# Patient Record
Sex: MALE | Race: WHITE | Employment: FULL TIME | URBAN - METROPOLITAN AREA
[De-identification: names, ages, dates, MRNs, and addresses within clinical notes are randomized per-mention and may not be internally consistent; named-entity substitution may affect disease eponyms.]

---

## 2021-03-31 ENCOUNTER — APPOINTMENT (OUTPATIENT)
Dept: GENERAL RADIOLOGY | Age: 36
End: 2021-03-31
Payer: COMMERCIAL

## 2021-03-31 ENCOUNTER — HOSPITAL ENCOUNTER (EMERGENCY)
Age: 36
Discharge: HOME | End: 2021-03-31
Payer: COMMERCIAL

## 2021-03-31 VITALS
RESPIRATION RATE: 18 BRPM | DIASTOLIC BLOOD PRESSURE: 88 MMHG | HEIGHT: 73 IN | OXYGEN SATURATION: 99 % | SYSTOLIC BLOOD PRESSURE: 169 MMHG | HEART RATE: 74 BPM | WEIGHT: 310 LBS | BODY MASS INDEX: 41.08 KG/M2 | TEMPERATURE: 97.9 F

## 2021-03-31 DIAGNOSIS — S61.412A LACERATION OF LEFT HAND WITHOUT FOREIGN BODY, INITIAL ENCOUNTER: Primary | ICD-10-CM

## 2021-03-31 PROCEDURE — 73130 X-RAY EXAM OF HAND: CPT

## 2021-03-31 PROCEDURE — 12002 RPR S/N/AX/GEN/TRNK2.6-7.5CM: CPT

## 2021-03-31 PROCEDURE — 99283 EMERGENCY DEPT VISIT LOW MDM: CPT

## 2021-04-01 NOTE — ED NOTES
Bed: TR  Expected date:   Expected time:   Means of arrival:   Comments:     Caitlyn Torres RN  03/31/21 2105

## 2021-04-01 NOTE — ED PROVIDER NOTES
[address] ED  EMERGENCY DEPARTMENT ENCOUNTER        Pt Name: Kate Tracy  MRN: [id number]  [place] 1985  Date of evaluation: 3/31/2021  Provider: Harry Braden PA-C  PCP: Breanne Dinh DO    PATRIA. I have evaluated this patient. My supervising physician was available for consultation. CHIEF COMPLAINT       Chief Complaint   Patient presents with    Laceration     pt stabbed self with knife in palm of lf hand. Pt went to urgent care, they saw tendon/ligament and sent to the ER. tetanus up to date. laceration was already cleaned and irrigated prior to arrival.       HISTORY OF PRESENT ILLNESS   (Location, Timing/Onset, Context/Setting, Quality, Duration, Modifying Factors, Severity, Associated Signs and Symptoms)  Note limiting factors. Kate Tracy is a 39 y.o. male brought in today for evaluation of a laceration to his left palm. States that he accidentally cut himself with a  knife while trying to get brownies out of a pan. Onset occurred just prior to arrival to the ED. Duration of symptoms have been persistent since onset. Context includes left hand laceration. He states he is already up-to-date on his tetanus shot. He is right-hand dominant. States he was seen at urgent care and they saw a tendon and so they advised him to come into the ED for further evaluation. Patient denies any loss of range of motion of his left hand. Denies any decreased sensation. He has not tried anything at home for symptomatic relief. No aggravating complaints. No alleviating complaints. He otherwise denies any other concerns at this time. Nursing Notes were all reviewed and agreed with or any disagreements were addressed in the HPI. REVIEW OF SYSTEMS    (2-9 systems for level 4, 10 or more for level 5)     Review of Systems   Constitutional: Negative. Musculoskeletal: Negative. Skin: Positive for wound. Neurological: Negative.     Hematological: Negative. Positives and Pertinent negatives as per HPI. Except as noted above in the ROS, all other systems were reviewed and negative. PAST MEDICAL HISTORY   History reviewed. No pertinent past medical history. SURGICAL HISTORY     Past Surgical History:   Procedure Laterality Date    CHOLECYSTECTOMY           CURRENTMEDICATIONS       Previous Medications    IBUPROFEN (ADVIL;MOTRIN) 800 MG TABLET    Take 1 tablet by mouth every 8 hours as needed for Pain or Fever         ALLERGIES     Patient has no known allergies. FAMILYHISTORY     History reviewed. No pertinent family history. SOCIAL HISTORY       Social History     Tobacco Use    Smoking status: Never Smoker    Smokeless tobacco: Current User     Types: Chew   Substance Use Topics    Alcohol use: No    Drug use: No       SCREENINGS             PHYSICAL EXAM    (up to 7 for level 4, 8 or more for level 5)     ED Triage Vitals   BP Temp Temp Source Pulse Resp SpO2 Height Weight   03/31/21 2103 03/31/21 2103 03/31/21 2100 03/31/21 2103 03/31/21 2103 03/31/21 2103 03/31/21 2100 03/31/21 2100   (!) 169/89 97.9 °F (36.6 °C) Temporal 76 16 98 % 6' 1\" (1.854 m) (!) 310 lb (140.6 kg)       Physical Exam  Vitals signs and nursing note reviewed. Constitutional:       Appearance: He is well-developed. He is not diaphoretic. HENT:      Head: Normocephalic and atraumatic. Nose: Nose normal.   Eyes:      General:         Right eye: No discharge. Left eye: No discharge. Neck:      Musculoskeletal: Normal range of motion and neck supple. Pulmonary:      Effort: Pulmonary effort is normal. No respiratory distress. Musculoskeletal: Normal range of motion. General: No deformity. Skin:     General: Skin is warm and dry. Findings: Laceration present. Comments: 4 cm laceration to the left palm. Neurovascularly intact. Tendon is visualized however no injury to the tendon.   Patient has full range of motion of all 5 digits including full flexion and extension as well as AB duction and adduction. Bleeding controlled. No joint involvement. Neurological:      Mental Status: He is alert and oriented to person, place, and time. Psychiatric:         Behavior: Behavior normal.         DIAGNOSTIC RESULTS   LABS:    Labs Reviewed - No data to display    All other labs were within normal range or not returned as of this dictation. EKG: All EKG's are interpreted by the Emergency Department Physician in the absence of a cardiologist.  Please see their note for interpretation of EKG. RADIOLOGY:   Non-plain film images such as CT, Ultrasound and MRI are read by the radiologist. Plain radiographic images are visualized and preliminarily interpreted by the ED Provider with the below findings:        Interpretation per the Radiologist below, if available at the time of this note:    XR HAND RIGHT (MIN 3 VIEWS)   Final Result   Negative for radiopaque foreign body or bony injury           Xr Hand Right (min 3 Views)    Result Date: 3/31/2021  EXAMINATION: THREE XRAY VIEWS OF THE RIGHT HAND 3/31/2021 9:42 pm COMPARISON: None. HISTORY: ORDERING SYSTEM PROVIDED HISTORY: puncture wound TECHNOLOGIST PROVIDED HISTORY: Reason for exam:->puncture wound Reason for Exam: laceration palm of hand FINDINGS: Finger ring obscures the midshaft of the 4th proximal phalanx. No radiopaque foreign body no bony injury. No bony or joint abnormality     Negative for radiopaque foreign body or bony injury           PROCEDURES   Unless otherwise noted below, none     Lac Repair    Date/Time: 3/31/2021 10:56 PM  Performed by: Ian Zavaleta PA-C  Authorized by: Ian Zavaleta PA-C     Consent:     Consent obtained:  Verbal    Alternatives discussed:  No treatment  Anesthesia (see MAR for exact dosages):      Anesthesia method:  Local infiltration    Local anesthetic:  Lidocaine 1% w/o epi  Laceration details:     Location:  Hand    Hand location: L palm    Length (cm):  4  Repair type:     Repair type:  Simple  Exploration:     Hemostasis achieved with:  Direct pressure    Wound exploration: wound explored through full range of motion      Wound extent: no areolar tissue violation noted, no fascia violation noted, no foreign bodies/material noted, no muscle damage noted, no nerve damage noted, no tendon damage noted, no underlying fracture noted and no vascular damage noted      Contaminated: no    Treatment:     Area cleansed with:  Saline    Amount of cleaning:  Standard    Irrigation solution:  Sterile saline    Irrigation volume:  100    Irrigation method:  Syringe    Visualized foreign bodies/material removed: no    Skin repair:     Repair method:  Sutures    Suture size:  4-0    Suture technique:  Simple interrupted    Number of sutures:  4  Approximation:     Approximation:  Close  Post-procedure details:     Dressing:  Bulky dressing    Patient tolerance of procedure: Tolerated well, no immediate complications        CRITICAL CARE TIME   N/A    CONSULTS:  None      EMERGENCY DEPARTMENT COURSE and DIFFERENTIAL DIAGNOSIS/MDM:   Vitals:    Vitals:    03/31/21 2100 03/31/21 2103   BP:  (!) 169/89   Pulse:  76   Resp:  16   Temp:  97.9 °F (36.6 °C)   TempSrc: Temporal Temporal   SpO2:  98%   Weight: (!) 310 lb (140.6 kg)    Height: 6' 1\" (1.854 m)        Patient was given the following medications:  Medications - No data to display        Patient brought in today by private vehicle for complaints of a laceration to his left palm. On exam patient is alert oriented afebrile breathing on room air satting at 98%. Nontoxic. No acute respiratory distress. Old labs and records reviewed at this time. Patient was seen by myself and my attending was available for consultation. X-ray reveals negative for radiopaque foreign body. No bony injury. Patient is up-to-date on his tetanus shot. Patient cleaned extensively here.   Please see procedure note for full details. Patient tolerated procedure well. Patient told to return in 7 to 10 days for suture removal.  Patient told to return sooner if he noticed any new or worsening symptoms including but not limited to increased pain redness swelling drainage or any other signs or symptoms of infection or any new or worsening symptoms. Patient was given follow-up for orthopedic hand and told to follow-up in the next 3 to 5 days. Patient verbalized understanding of this plan was comfortable and stable at time of discharge. I did feel comfortable sending this patient home with close follow-up instructions and strict return precautions. Patient was discharged in stable condition. FINAL IMPRESSION      1.  Laceration of left hand without foreign body, initial encounter          DISPOSITION/PLAN   DISPOSITION Discharge - Pending Orders Complete 03/31/2021 10:40:37 PM      PATIENT REFERREDTO:  Jose Juan Gatica MD  [address] [address]  [phone]    Schedule an appointment as soon as possible for a visit   As needed, If symptoms worsen    [facility] ED  [address] [place] [address]  Schedule an appointment as soon as possible for a visit   As needed, If symptoms worsen, For suture removal 10-12 days      DISCHARGE MEDICATIONS:  New Prescriptions    No medications on file       DISCONTINUED MEDICATIONS:  Discontinued Medications    No medications on file              (Please note that portions of this note were completed with a voice recognition program.  Efforts were made to edit the dictations but occasionally words are mis-transcribed.)    Cahterine Nieto PA-C (electronically signed)            Catherine Nieto PA-C  03/31/21 0157
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
## 2021-04-05 ENCOUNTER — OFFICE VISIT (OUTPATIENT)
Dept: ORTHOPEDIC SURGERY | Age: 36
End: 2021-04-05
Payer: COMMERCIAL

## 2021-04-05 VITALS — HEIGHT: 73 IN | BODY MASS INDEX: 41.08 KG/M2 | WEIGHT: 310 LBS

## 2021-04-05 DIAGNOSIS — S65.312A LACERATION OF DEEP PALMAR ARCH OF LEFT HAND, INITIAL ENCOUNTER: Primary | ICD-10-CM

## 2021-04-05 DIAGNOSIS — S61.402A FLEXOR TENDON LACERATION OF LEFT HAND WITH OPEN WOUND, INITIAL ENCOUNTER: ICD-10-CM

## 2021-04-05 DIAGNOSIS — S66.822A FLEXOR TENDON LACERATION OF LEFT HAND WITH OPEN WOUND, INITIAL ENCOUNTER: ICD-10-CM

## 2021-04-05 PROCEDURE — 99204 OFFICE O/P NEW MOD 45 MIN: CPT | Performed by: PHYSICIAN ASSISTANT

## 2021-04-05 NOTE — LETTER
[address] [place] [address] Dr Gaviota Infante [employer] [address]  Phone: [phone]  Fax: [phone]    Annita De Leon        April 5, 2021     Patient: Rhina Gonzalez   YOB: 1985   Date of Visit: 4/5/2021       To Whom It May Concern: It is my medical opinion that [facility] may return to full duty immediately with no restrictions. If you have any questions or concerns, please don't hesitate to call.     Sincerely,    Ashly Saavedra PA-C

## 2021-04-05 NOTE — PROGRESS NOTES
Range of Motion: Full range of motion of the second digit at the PIP, DIP, and MCP joints    Strength: 5/5 strength with flexion and extension at the PIP, DIP, and MCP joints    Special Tests: Sensation intact but decreased in sensitivity over the radial aspect of the second digit    Skin: There are no rashes, ulcerations or lesions. Gait: Normal    Reflex +2    Additional Comments:       Additional Examinations:         Right Upper Extremity:  Examination of the right upper extremity does not show any tenderness, deformity or injury. Range of motion is unremarkable. There is no gross instability. There are no rashes, ulcerations or lesions. Strength and tone are normal.    Radiology:     X-rays obtained in the emergency room and reviewed in office:  Views AP, lateral, and oblique views  Location left hand  Impression no evidence of fractures, dislocations, or foreign bodies          Assessment : Left hand laceration    Impression:  Encounter Diagnoses   Name Primary?  Laceration of deep palmar arch of left hand, initial encounter Yes    Flexor tendon laceration of left hand with open wound, initial encounter        Office Procedures:  No orders of the defined types were placed in this encounter. Treatment Plan: As stated in HPI patient did have a video taken of his hand. It does appear that there is a tendon that is protruding from the wound. This is an contradiction to the emergency room port which states no tendon damage. He will continue to keep the wound clean and dry. He will continue to ice and elevate for pain and swelling control. I will refer the patient for a hand specialty consultation. This should be performed in the next 2 days. I discussed with Kemi Kate that his history, symptoms, signs and imaging are most consistent with hand laceration.     We reviewed the natural history of these conditions and treatment options ranging from conservative measures (rest, icing, activity

## 2021-04-07 ENCOUNTER — HOSPITAL ENCOUNTER (OUTPATIENT)
Age: 36
Discharge: HOME | End: 2021-04-07
Payer: COMMERCIAL

## 2021-04-07 PROCEDURE — U0005 INFEC AGEN DETEC AMPLI PROBE: HCPCS

## 2021-04-07 PROCEDURE — U0003 INFECTIOUS AGENT DETECTION BY NUCLEIC ACID (DNA OR RNA); SEVERE ACUTE RESPIRATORY SYNDROME CORONAVIRUS 2 (SARS-COV-2) (CORONAVIRUS DISEASE [COVID-19]), AMPLIFIED PROBE TECHNIQUE, MAKING USE OF HIGH THROUGHPUT TECHNOLOGIES AS DESCRIBED BY CMS-2020-01-R: HCPCS

## 2021-04-08 LAB — SARS-COV-2: NOT DETECTED

## 2023-02-02 ENCOUNTER — APPOINTMENT (OUTPATIENT)
Dept: CT IMAGING | Age: 38
End: 2023-02-02
Payer: COMMERCIAL

## 2023-02-02 ENCOUNTER — HOSPITAL ENCOUNTER (EMERGENCY)
Age: 38
Discharge: HOME | End: 2023-02-02
Attending: STUDENT IN AN ORGANIZED HEALTH CARE EDUCATION/TRAINING PROGRAM
Payer: COMMERCIAL

## 2023-02-02 VITALS
BODY MASS INDEX: 41.75 KG/M2 | HEIGHT: 73 IN | TEMPERATURE: 98 F | RESPIRATION RATE: 16 BRPM | HEART RATE: 70 BPM | SYSTOLIC BLOOD PRESSURE: 179 MMHG | DIASTOLIC BLOOD PRESSURE: 113 MMHG | OXYGEN SATURATION: 100 % | WEIGHT: 315 LBS

## 2023-02-02 DIAGNOSIS — S39.012A STRAIN OF LUMBAR REGION, INITIAL ENCOUNTER: Primary | ICD-10-CM

## 2023-02-02 PROCEDURE — 99284 EMERGENCY DEPT VISIT MOD MDM: CPT

## 2023-02-02 PROCEDURE — 72131 CT LUMBAR SPINE W/O DYE: CPT

## 2023-02-02 RX ORDER — METHOCARBAMOL 500 MG/1
500 TABLET, FILM COATED ORAL EVERY 8 HOURS PRN
Qty: 30 TABLET | Refills: 0 | Status: SHIPPED | OUTPATIENT
Start: 2023-02-02 | End: 2023-02-12

## 2023-02-02 RX ORDER — IBUPROFEN 800 MG/1
800 TABLET ORAL 2 TIMES DAILY PRN
Qty: 30 TABLET | Refills: 0 | Status: SHIPPED | OUTPATIENT
Start: 2023-02-02

## 2023-02-02 RX ORDER — KETOROLAC TROMETHAMINE 30 MG/ML
30 INJECTION, SOLUTION INTRAMUSCULAR; INTRAVENOUS ONCE
Status: DISCONTINUED | OUTPATIENT
Start: 2023-02-02 | End: 2023-02-02 | Stop reason: HOSPADM

## 2023-02-02 RX ORDER — METHYLPREDNISOLONE 4 MG/1
TABLET ORAL
Qty: 1 KIT | Refills: 0 | Status: SHIPPED | OUTPATIENT
Start: 2023-02-02

## 2023-02-02 RX ORDER — DEXAMETHASONE SODIUM PHOSPHATE 10 MG/ML
10 INJECTION, SOLUTION INTRAMUSCULAR; INTRAVENOUS ONCE
Status: DISCONTINUED | OUTPATIENT
Start: 2023-02-02 | End: 2023-02-02 | Stop reason: HOSPADM

## 2023-02-02 ASSESSMENT — PAIN DESCRIPTION - FREQUENCY: FREQUENCY: CONTINUOUS

## 2023-02-02 ASSESSMENT — PAIN DESCRIPTION - ORIENTATION: ORIENTATION: LOWER

## 2023-02-02 ASSESSMENT — PAIN SCALES - GENERAL: PAINLEVEL_OUTOF10: 8

## 2023-02-02 ASSESSMENT — PAIN - FUNCTIONAL ASSESSMENT: PAIN_FUNCTIONAL_ASSESSMENT: 0-10

## 2023-02-02 ASSESSMENT — PAIN DESCRIPTION - LOCATION: LOCATION: BACK

## 2023-02-02 NOTE — ED PROVIDER NOTES
Primary Care Physician: Dawood Llamas DO   Attending Physician: No att. providers found     History   Chief Complaint   Patient presents with    Back Pain     Injured back on Tuesday at work. Seen at Urgent care same day and ordered to take OTC pain medication. Denies numbness in legs or incontinence        HPI   Pallavi Vaz  is a 40 y.o. male with no medical history apart from morbid obesity who presents complaining of back pain. Patient stated 2 days ago when he was at work when he had a pop. He stated since then he has been having pain. He denies any bladder or stool incontinence. Stated he is never had sciatic nerve pain in the past.  Now the pain is worse with movements. He stated that the pain does not radiate to his legs no numbness or weakness. History reviewed. No pertinent past medical history. Past Surgical History:   Procedure Laterality Date    CHOLECYSTECTOMY          History reviewed. No pertinent family history. Social History     Socioeconomic History    Marital status:      Spouse name: None    Number of children: None    Years of education: None    Highest education level: None   Tobacco Use    Smoking status: Never    Smokeless tobacco: Current     Types: Chew   Substance and Sexual Activity    Alcohol use: No    Drug use: No    Sexual activity: Yes     Partners: Female        Review of Systems   10 total systems reviewed and found to be negative unless otherwise noted in HPI     Physical Exam   BP (!) 179/113   Pulse 70   Temp 98 °F (36.7 °C)   Resp 16   Ht 6' 1\" (1.854 m)   Wt (!) 375 lb (170.1 kg)   SpO2 100%   BMI 49.48 kg/m²      Physical Exam  Vitals and nursing note reviewed. Constitutional:       General: Pt is in acute distress. Appearance: Pt is well appearing. He is not diaphoretic. HENT:      Head: Normocephalic and atraumatic.       Right Ear: Tympanic membrane normal.      Left Ear: Tympanic membrane normal.      Nose: Nose normal. Mouth/Throat:      Mouth: Mucous membranes are moist.      Pharynx: Oropharynx is clear. Eyes:      Extraocular Movements: Extraocular movements intact. Conjunctiva/sclera: Conjunctivae normal.      Pupils: Pupils are equal, round, and reactive to light. Cardiovascular:      Rate and Rhythm: Normal rate and regular rhythm. Pulses: Normal pulses. Heart sounds: Normal heart sounds. No murmur heard. No gallop. Pulmonary:      Effort: Pulmonary effort is normal.      Breath sounds: Normal breath sounds. Abdominal:      General: Bowel sounds are normal. There is no distension. Palpations: Abdomen is soft. Tenderness: There is no abdominal tenderness. There is no guarding or rebound. Musculoskeletal:         General: No tenderness. Normal range of motion. Cervical back: Normal range of motion and neck supple. Right foot: Normal.      Left foot: Normal. There is no leg swelling, deformity, no tenderness. Normal pulse. Skin:     General: Skin is warm and dry. Capillary Refill: Capillary refill takes less than 2 seconds. Findings: No bruising or erythema. Neurological:      General: No focal deficit present. Mental Status: He is alert and oriented to person, place, and time. Gait: Gait normal.     DIAGNOSTIC RESULTS:  LABS:  Labs Reviewed - No data to display    All other labs were withinnormal range or not returned as of this dictation    RADIOLOGY:   Non-plain film images such as CT, Ultrasoundand MRI are read by the radiologist. Plain radiographic images are visualized and preliminarily interpreted by the emergency physician with the below findings:  CT LUMBAR SPINE WO CONTRAST   Final Result   1. No acute fracture. No results found.      ED BEDSIDE ULTRASOUND:   Performed by ED Physician - none    EKG:     EMERGENCY DEPARTMENT COURSE and DIFFERENTIAL DIAGNOSIS/MDM:   PMH, Surgical Hx, FH, Social Hx reviewed by myself (ETOH usage, Tobacco usage,   Drug usage reviewed by myself, no pertinent Hx)- No Pertinent History     Old records were reviewed by me     Medications - No data to display       PROCEDURES:   Procedures    [facility] (ASSESSMENT AND PLAN):  [id number], Ángel Stauffer is a 37 y.o. male with no medical history apart from morbid obesity who presents complaining of back pain.  Patient stated 2 days ago when he was at work when he had a pop.  He stated since then he has been having pain.  He denies any bladder or stool incontinence.  Stated he is never had sciatic nerve pain in the past.  Now the pain is worse with movements.  He stated that the pain does not radiate to his legs no numbness or weakness.  Exam tender to palpation on the spine but neurologically intact.  Blood pressures are also elevated which I believe is secondary to pain.  With concerns of cauda equina even though this was less likely given no stool or bladder incontinent as well as concern for spinal fracture I did obtain a CT which was negative.  I believe this is muscle sprain.  Patient is stable and angiogram for admission discharged home with muscle relaxers steroids with recommendation to follow-up with primary care doctor.  No blood pressures were elevated I recommended that patient follows up with his primary care doctor for recheck blood pressure and possible treatment.     DDX-Caudal equina, spinal injury, muscle skeleton pain  Social Determinants (Poverty, Education, uninsured) -uninsured poverty  Prior notes-PMD notes reviewed  Name and route all medications-see meds section above  Charting interpretations all by myself  Diagnosis descriptions-severe  Consults-none  Disposition- Considered -discharge     I estimate there is LOW risk for COMPARTMENT SYNDROME, DEEP VENOUS THROMBOSIS, SEPTIC ARTHRITIS, TENDON OR NEUROVASCULAR INJURY, thus I consider the discharge disposition reasonable.patient and I have discussed the diagnosis and risks, and we agree with  discharging home to follow-up with their primary doctor or the referral orthopedist. We also discussed returning to the Emergency Department immediately if new or worsening symptoms occur. We have discussed the symptoms which are most concerning (e.g., changing or worsening pain, numbness, weakness) that necessitate immediate return. I PERSONALLY SAW THE PATIENT AND PERFORMED A SUBSTANTIVE PORTION OF THE VISIT INCLUDING ALL ASPECTS OF THE MEDICAL DECISION MAKING PROCESS. The primary clinician of record Zully Buitrago MD    ClINICAL IMPRESSION:  1. Strain of lumbar region, initial encounter          PATIENT REFERRED TO:  DO Julianne Manzano  [phone]    Schedule an appointment as soon as possible for a visit in 2 days      DISCHARGE MEDICATIONS:  Discharge Medication List as of 2/2/2023  9:15 AM        START taking these medications    Details   methylPREDNISolone (MEDROL, MOODY,) 4 MG tablet Take by mouth., Disp-1 kit, R-0Print      methocarbamol (ROBAXIN) 500 MG tablet Take 1 tablet by mouth every 8 hours as needed (Pain), Disp-30 tablet, R-0Print      !! ibuprofen (ADVIL;MOTRIN) 800 MG tablet Take 1 tablet by mouth 2 times daily as needed for Pain, Disp-30 tablet, R-0Print       !! - Potential duplicate medications found. Please discuss with provider. DISCONTINUED MEDICATIONS:  Discharge Medication List as of 2/2/2023  9:15 AM        DISPOSITION Decision To Discharge 02/02/2023 08:28:50 AM    ______________________________________________________________________  ____________________________________________________________________________________________  This record is transcribed utilizing voice recognition technology. There are inherent limitations in this technology. In addition, there may be limitations in editing of this report. If there are any discrepancies, please contact me directly.         Roxy Licona MD  02/02/23 8513

## 2023-02-02 NOTE — Clinical Note
Fabián Fine was seen and treated in our emergency department on 2/2/2023. He may return to work on 02/13/2023. If you have any questions or concerns, please don't hesitate to call.       Catracho Portillo MD

## 2023-02-02 NOTE — ED NOTES
Discharge instructions reviewed, patient verbalizes understanding. Denies questions/concerns at this time. Patient ambulatory out of ED in stable condition with all belongings.        Ally Collazo RN  02/02/23 7040

## 2024-05-07 ENCOUNTER — TELEPHONE (OUTPATIENT)
Dept: SLEEP MEDICINE | Age: 39
End: 2024-05-07

## 2024-05-07 ENCOUNTER — TELEMEDICINE (OUTPATIENT)
Dept: SLEEP MEDICINE | Age: 39
End: 2024-05-07
Payer: COMMERCIAL

## 2024-05-07 ENCOUNTER — TELEPHONE (OUTPATIENT)
Dept: PULMONOLOGY | Age: 39
End: 2024-05-07

## 2024-05-07 DIAGNOSIS — Z86.69 HISTORY OF OBSTRUCTIVE SLEEP APNEA: Primary | ICD-10-CM

## 2024-05-07 DIAGNOSIS — G47.33 SEVERE OBSTRUCTIVE SLEEP APNEA: Primary | ICD-10-CM

## 2024-05-07 DIAGNOSIS — I10 PRIMARY HYPERTENSION: ICD-10-CM

## 2024-05-07 DIAGNOSIS — G25.81 RLS (RESTLESS LEGS SYNDROME): ICD-10-CM

## 2024-05-07 DIAGNOSIS — R06.83 SNORING: ICD-10-CM

## 2024-05-07 DIAGNOSIS — G47.10 HYPERSOMNIA: ICD-10-CM

## 2024-05-07 DIAGNOSIS — R53.83 OTHER FATIGUE: ICD-10-CM

## 2024-05-07 DIAGNOSIS — G47.30 OBSERVED SLEEP APNEA: ICD-10-CM

## 2024-05-07 DIAGNOSIS — E66.01 MORBID OBESITY WITH BMI OF 50.0-59.9, ADULT (HCC): ICD-10-CM

## 2024-05-07 PROCEDURE — 99204 OFFICE O/P NEW MOD 45 MIN: CPT | Performed by: NURSE PRACTITIONER

## 2024-05-07 ASSESSMENT — SLEEP AND FATIGUE QUESTIONNAIRES
HOW LIKELY ARE YOU TO NOD OFF OR FALL ASLEEP WHILE SITTING AND TALKING TO SOMEONE: SLIGHT CHANCE OF DOZING
HOW LIKELY ARE YOU TO NOD OFF OR FALL ASLEEP WHILE SITTING INACTIVE IN A PUBLIC PLACE: HIGH CHANCE OF DOZING
ESS TOTAL SCORE: 20
HOW LIKELY ARE YOU TO NOD OFF OR FALL ASLEEP WHEN YOU ARE A PASSENGER IN A CAR FOR AN HOUR WITHOUT A BREAK: HIGH CHANCE OF DOZING
HOW LIKELY ARE YOU TO NOD OFF OR FALL ASLEEP WHILE SITTING AND READING: HIGH CHANCE OF DOZING
HOW LIKELY ARE YOU TO NOD OFF OR FALL ASLEEP WHILE LYING DOWN TO REST IN THE AFTERNOON WHEN CIRCUMSTANCES PERMIT: HIGH CHANCE OF DOZING
HOW LIKELY ARE YOU TO NOD OFF OR FALL ASLEEP IN A CAR, WHILE STOPPED FOR A FEW MINUTES IN TRAFFIC: MODERATE CHANCE OF DOZING
HOW LIKELY ARE YOU TO NOD OFF OR FALL ASLEEP WHILE WATCHING TV: MODERATE CHANCE OF DOZING
HOW LIKELY ARE YOU TO NOD OFF OR FALL ASLEEP WHILE SITTING QUIETLY AFTER LUNCH WITHOUT ALCOHOL: HIGH CHANCE OF DOZING

## 2024-05-07 NOTE — TELEPHONE ENCOUNTER
Patient had a New pt appt for CHIDI on 5/7/2024    He requested records from [facility], Sleep study results to be sent to our office. Our fax number was given.     Awaiting records    Pt aware to contact DME company to see who is covered
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
## 2024-05-07 NOTE — PATIENT INSTRUCTIONS
into your favorite sleeping position: If you can't fall asleep within 15-30 minutes, get up and do something boring until you feel sleepy. Sit quietly in the dark or read the warranty on your refrigerator. Don't expose yourself to bright light while you are up, it gives cues to your brain that it is time to wake up.  11- Only use your bed for sleeping: Don’t use the bed as an office, workroom or recreation room. Let your body \"know\" that the bed is associated with sleeping  12- Use comfortable bedding. Uncomfortable bedding can prevent good sleep. Evaluate whether or not this is a source of your problem, and make appropriate changes.  13- Make sure your bed and bedroom are quiet and comfortable: A hot room can be uncomfortable. A cooler room, along with enough blankets to stay warm is recommended. Get a blackout shade or wear a slumber mask and wear earplugs or get a \"white noise\" machine for light and noise distractions.   14- Use sunlight to set your biological clock: When you get up in the morning, go outside and turn your face to the sun for 15 minutes.  15- Don’t take your worries to bed: Leave worries about job, school, daily life, etc., behind when you go to bed. Some people find it useful to assign a \"worry period\" during the evening or afternoon for these issues.

## 2024-05-10 NOTE — TELEPHONE ENCOUNTER
Records reviewed.    11/9/2016 PSG AHI 61.4, low SpO2 71%  1/7/2017 titration final pressure BiPAP 22/18 cm H2O    Will need to change testing to full night titration.  I will change order in epic.  Please call sleep center to make aware

## 2024-05-20 NOTE — TELEPHONE ENCOUNTER
Called tiffanie spoke to Xuan at the sleep center, patient is scheduled for a full night titration on 6/14/2024

## 2024-06-13 ENCOUNTER — HOSPITAL ENCOUNTER (INPATIENT)
Age: 39
LOS: 7 days | Discharge: HOME | DRG: 871 | End: 2024-06-20
Attending: EMERGENCY MEDICINE | Admitting: INTERNAL MEDICINE
Payer: COMMERCIAL

## 2024-06-13 ENCOUNTER — APPOINTMENT (OUTPATIENT)
Dept: GENERAL RADIOLOGY | Age: 39
DRG: 871 | End: 2024-06-13
Payer: COMMERCIAL

## 2024-06-13 ENCOUNTER — APPOINTMENT (OUTPATIENT)
Dept: CT IMAGING | Age: 39
DRG: 871 | End: 2024-06-13
Payer: COMMERCIAL

## 2024-06-13 DIAGNOSIS — J18.9 PNEUMONIA OF RIGHT LOWER LOBE DUE TO INFECTIOUS ORGANISM: Primary | ICD-10-CM

## 2024-06-13 DIAGNOSIS — R00.0 TACHYCARDIA: ICD-10-CM

## 2024-06-13 DIAGNOSIS — R06.02 SHORTNESS OF BREATH: ICD-10-CM

## 2024-06-13 DIAGNOSIS — J91.8 PARAPNEUMONIC EFFUSION: ICD-10-CM

## 2024-06-13 DIAGNOSIS — J18.9 PARAPNEUMONIC EFFUSION: ICD-10-CM

## 2024-06-13 DIAGNOSIS — E87.1 HYPONATREMIA: ICD-10-CM

## 2024-06-13 LAB
ANION GAP SERPL CALCULATED.3IONS-SCNC: 11 MMOL/L (ref 3–16)
ANION GAP SERPL CALCULATED.3IONS-SCNC: 12 MMOL/L (ref 3–16)
BASOPHILS # BLD: 0.1 K/UL (ref 0–0.2)
BASOPHILS NFR BLD: 0.4 %
BUN SERPL-MCNC: 10 MG/DL (ref 7–20)
BUN SERPL-MCNC: 11 MG/DL (ref 7–20)
CALCIUM SERPL-MCNC: 8.8 MG/DL (ref 8.3–10.6)
CALCIUM SERPL-MCNC: 9.1 MG/DL (ref 8.3–10.6)
CHLORIDE SERPL-SCNC: 93 MMOL/L (ref 99–110)
CHLORIDE SERPL-SCNC: 98 MMOL/L (ref 99–110)
CO2 SERPL-SCNC: 24 MMOL/L (ref 21–32)
CO2 SERPL-SCNC: 26 MMOL/L (ref 21–32)
CREAT SERPL-MCNC: 1 MG/DL (ref 0.9–1.3)
CREAT SERPL-MCNC: 1 MG/DL (ref 0.9–1.3)
DEPRECATED RDW RBC AUTO: 13.2 % (ref 12.4–15.4)
EKG ATRIAL RATE: 114 BPM
EKG DIAGNOSIS: NORMAL
EKG P AXIS: 49 DEGREES
EKG P-R INTERVAL: 128 MS
EKG Q-T INTERVAL: 316 MS
EKG QRS DURATION: 90 MS
EKG QTC CALCULATION (BAZETT): 435 MS
EKG R AXIS: 48 DEGREES
EKG T AXIS: 28 DEGREES
EKG VENTRICULAR RATE: 114 BPM
EOSINOPHIL # BLD: 0 K/UL (ref 0–0.6)
EOSINOPHIL NFR BLD: 0.1 %
FERRITIN SERPL IA-MCNC: 1321 NG/ML (ref 30–400)
GFR SERPLBLD CREATININE-BSD FMLA CKD-EPI: >90 ML/MIN/{1.73_M2}
GFR SERPLBLD CREATININE-BSD FMLA CKD-EPI: >90 ML/MIN/{1.73_M2}
GLUCOSE SERPL-MCNC: 114 MG/DL (ref 70–99)
GLUCOSE SERPL-MCNC: 121 MG/DL (ref 70–99)
HCT VFR BLD AUTO: 37.7 % (ref 40.5–52.5)
HGB BLD-MCNC: 12.5 G/DL (ref 13.5–17.5)
IRON SATN MFR SERPL: 9 % (ref 20–50)
IRON SERPL-MCNC: 18 UG/DL (ref 59–158)
LACTATE BLDV-SCNC: 1 MMOL/L (ref 0.4–2)
LYMPHOCYTES # BLD: 1 K/UL (ref 1–5.1)
LYMPHOCYTES NFR BLD: 7.8 %
MCH RBC QN AUTO: 30.3 PG (ref 26–34)
MCHC RBC AUTO-ENTMCNC: 33.1 G/DL (ref 31–36)
MCV RBC AUTO: 91.5 FL (ref 80–100)
MONOCYTES # BLD: 0.9 K/UL (ref 0–1.3)
MONOCYTES NFR BLD: 7.3 %
NEUTROPHILS # BLD: 10.9 K/UL (ref 1.7–7.7)
NEUTROPHILS NFR BLD: 84.4 %
NT-PROBNP SERPL-MCNC: 77 PG/ML (ref 0–124)
PLATELET # BLD AUTO: 321 K/UL (ref 135–450)
PMV BLD AUTO: 7.9 FL (ref 5–10.5)
POTASSIUM SERPL-SCNC: 3.9 MMOL/L (ref 3.5–5.1)
POTASSIUM SERPL-SCNC: 3.9 MMOL/L (ref 3.5–5.1)
PROCALCITONIN SERPL IA-MCNC: 0.3 NG/ML (ref 0–0.15)
RBC # BLD AUTO: 4.12 M/UL (ref 4.2–5.9)
SODIUM SERPL-SCNC: 128 MMOL/L (ref 136–145)
SODIUM SERPL-SCNC: 136 MMOL/L (ref 136–145)
TIBC SERPL-MCNC: 191 UG/DL (ref 260–445)
TROPONIN, HIGH SENSITIVITY: 9 NG/L (ref 0–22)
TROPONIN, HIGH SENSITIVITY: 9 NG/L (ref 0–22)
WBC # BLD AUTO: 12.9 K/UL (ref 4–11)

## 2024-06-13 PROCEDURE — 71260 CT THORAX DX C+: CPT

## 2024-06-13 PROCEDURE — 94640 AIRWAY INHALATION TREATMENT: CPT

## 2024-06-13 PROCEDURE — 2580000003 HC RX 258: Performed by: NURSE PRACTITIONER

## 2024-06-13 PROCEDURE — 87641 MR-STAPH DNA AMP PROBE: CPT

## 2024-06-13 PROCEDURE — 87633 RESP VIRUS 12-25 TARGETS: CPT

## 2024-06-13 PROCEDURE — 82728 ASSAY OF FERRITIN: CPT

## 2024-06-13 PROCEDURE — 6370000000 HC RX 637 (ALT 250 FOR IP): Performed by: INTERNAL MEDICINE

## 2024-06-13 PROCEDURE — 94761 N-INVAS EAR/PLS OXIMETRY MLT: CPT

## 2024-06-13 PROCEDURE — 93005 ELECTROCARDIOGRAM TRACING: CPT | Performed by: EMERGENCY MEDICINE

## 2024-06-13 PROCEDURE — 99222 1ST HOSP IP/OBS MODERATE 55: CPT

## 2024-06-13 PROCEDURE — 6370000000 HC RX 637 (ALT 250 FOR IP): Performed by: NURSE PRACTITIONER

## 2024-06-13 PROCEDURE — 83540 ASSAY OF IRON: CPT

## 2024-06-13 PROCEDURE — 6360000002 HC RX W HCPCS

## 2024-06-13 PROCEDURE — 5A09357 ASSISTANCE WITH RESPIRATORY VENTILATION, LESS THAN 24 CONSECUTIVE HOURS, CONTINUOUS POSITIVE AIRWAY PRESSURE: ICD-10-PCS | Performed by: INTERNAL MEDICINE

## 2024-06-13 PROCEDURE — 93010 ELECTROCARDIOGRAM REPORT: CPT | Performed by: INTERNAL MEDICINE

## 2024-06-13 PROCEDURE — 83605 ASSAY OF LACTIC ACID: CPT

## 2024-06-13 PROCEDURE — 2580000003 HC RX 258: Performed by: EMERGENCY MEDICINE

## 2024-06-13 PROCEDURE — 6360000002 HC RX W HCPCS: Performed by: NURSE PRACTITIONER

## 2024-06-13 PROCEDURE — 83036 HEMOGLOBIN GLYCOSYLATED A1C: CPT

## 2024-06-13 PROCEDURE — 71046 X-RAY EXAM CHEST 2 VIEWS: CPT

## 2024-06-13 PROCEDURE — 87070 CULTURE OTHR SPECIMN AEROBIC: CPT

## 2024-06-13 PROCEDURE — 99285 EMERGENCY DEPT VISIT HI MDM: CPT

## 2024-06-13 PROCEDURE — 84484 ASSAY OF TROPONIN QUANT: CPT

## 2024-06-13 PROCEDURE — 96360 HYDRATION IV INFUSION INIT: CPT

## 2024-06-13 PROCEDURE — 80048 BASIC METABOLIC PNL TOTAL CA: CPT

## 2024-06-13 PROCEDURE — 84145 PROCALCITONIN (PCT): CPT

## 2024-06-13 PROCEDURE — 83550 IRON BINDING TEST: CPT

## 2024-06-13 PROCEDURE — 36415 COLL VENOUS BLD VENIPUNCTURE: CPT

## 2024-06-13 PROCEDURE — 87449 NOS EACH ORGANISM AG IA: CPT

## 2024-06-13 PROCEDURE — 1200000000 HC SEMI PRIVATE

## 2024-06-13 PROCEDURE — 85025 COMPLETE CBC W/AUTO DIFF WBC: CPT

## 2024-06-13 PROCEDURE — 83880 ASSAY OF NATRIURETIC PEPTIDE: CPT

## 2024-06-13 PROCEDURE — 6360000002 HC RX W HCPCS: Performed by: EMERGENCY MEDICINE

## 2024-06-13 PROCEDURE — 6360000004 HC RX CONTRAST MEDICATION: Performed by: EMERGENCY MEDICINE

## 2024-06-13 PROCEDURE — 87040 BLOOD CULTURE FOR BACTERIA: CPT

## 2024-06-13 PROCEDURE — 87205 SMEAR GRAM STAIN: CPT

## 2024-06-13 PROCEDURE — 94660 CPAP INITIATION&MGMT: CPT

## 2024-06-13 RX ORDER — IPRATROPIUM BROMIDE AND ALBUTEROL SULFATE 2.5; .5 MG/3ML; MG/3ML
1 SOLUTION RESPIRATORY (INHALATION)
Status: DISCONTINUED | OUTPATIENT
Start: 2024-06-13 | End: 2024-06-13

## 2024-06-13 RX ORDER — ACETAMINOPHEN 325 MG/1
650 TABLET ORAL EVERY 6 HOURS PRN
Status: DISCONTINUED | OUTPATIENT
Start: 2024-06-13 | End: 2024-06-20 | Stop reason: HOSPADM

## 2024-06-13 RX ORDER — BUDESONIDE 0.5 MG/2ML
0.5 INHALANT ORAL
Status: DISCONTINUED | OUTPATIENT
Start: 2024-06-13 | End: 2024-06-20 | Stop reason: HOSPADM

## 2024-06-13 RX ORDER — IPRATROPIUM BROMIDE AND ALBUTEROL SULFATE 2.5; .5 MG/3ML; MG/3ML
1 SOLUTION RESPIRATORY (INHALATION)
Status: DISCONTINUED | OUTPATIENT
Start: 2024-06-14 | End: 2024-06-13

## 2024-06-13 RX ORDER — LOSARTAN POTASSIUM 100 MG/1
100 TABLET ORAL DAILY
Status: DISCONTINUED | OUTPATIENT
Start: 2024-06-13 | End: 2024-06-20 | Stop reason: HOSPADM

## 2024-06-13 RX ORDER — SODIUM CHLORIDE 0.9 % (FLUSH) 0.9 %
5-40 SYRINGE (ML) INJECTION EVERY 12 HOURS SCHEDULED
Status: DISCONTINUED | OUTPATIENT
Start: 2024-06-13 | End: 2024-06-20 | Stop reason: HOSPADM

## 2024-06-13 RX ORDER — ONDANSETRON 2 MG/ML
4 INJECTION INTRAMUSCULAR; INTRAVENOUS EVERY 6 HOURS PRN
Status: DISCONTINUED | OUTPATIENT
Start: 2024-06-13 | End: 2024-06-20 | Stop reason: HOSPADM

## 2024-06-13 RX ORDER — IPRATROPIUM BROMIDE AND ALBUTEROL SULFATE 2.5; .5 MG/3ML; MG/3ML
1 SOLUTION RESPIRATORY (INHALATION)
Status: DISCONTINUED | OUTPATIENT
Start: 2024-06-14 | End: 2024-06-14

## 2024-06-13 RX ORDER — ONDANSETRON 4 MG/1
4 TABLET, ORALLY DISINTEGRATING ORAL EVERY 8 HOURS PRN
Status: DISCONTINUED | OUTPATIENT
Start: 2024-06-13 | End: 2024-06-20 | Stop reason: HOSPADM

## 2024-06-13 RX ORDER — KETOROLAC TROMETHAMINE 15 MG/ML
15 INJECTION, SOLUTION INTRAMUSCULAR; INTRAVENOUS EVERY 6 HOURS PRN
Status: DISCONTINUED | OUTPATIENT
Start: 2024-06-13 | End: 2024-06-15

## 2024-06-13 RX ORDER — ENOXAPARIN SODIUM 100 MG/ML
40 INJECTION SUBCUTANEOUS 2 TIMES DAILY
Status: DISCONTINUED | OUTPATIENT
Start: 2024-06-13 | End: 2024-06-20 | Stop reason: HOSPADM

## 2024-06-13 RX ORDER — LOSARTAN POTASSIUM 100 MG/1
100 TABLET ORAL DAILY
Status: ON HOLD | COMMUNITY

## 2024-06-13 RX ORDER — SODIUM CHLORIDE 9 MG/ML
INJECTION, SOLUTION INTRAVENOUS CONTINUOUS
Status: DISCONTINUED | OUTPATIENT
Start: 2024-06-13 | End: 2024-06-13

## 2024-06-13 RX ORDER — SODIUM CHLORIDE 9 MG/ML
INJECTION, SOLUTION INTRAVENOUS PRN
Status: DISCONTINUED | OUTPATIENT
Start: 2024-06-13 | End: 2024-06-20 | Stop reason: HOSPADM

## 2024-06-13 RX ORDER — POLYETHYLENE GLYCOL 3350 17 G/17G
17 POWDER, FOR SOLUTION ORAL DAILY PRN
Status: DISCONTINUED | OUTPATIENT
Start: 2024-06-13 | End: 2024-06-20 | Stop reason: HOSPADM

## 2024-06-13 RX ORDER — IPRATROPIUM BROMIDE AND ALBUTEROL SULFATE 2.5; .5 MG/3ML; MG/3ML
1 SOLUTION RESPIRATORY (INHALATION) EVERY 4 HOURS PRN
Status: DISCONTINUED | OUTPATIENT
Start: 2024-06-13 | End: 2024-06-20 | Stop reason: HOSPADM

## 2024-06-13 RX ORDER — SODIUM CHLORIDE 0.9 % (FLUSH) 0.9 %
5-40 SYRINGE (ML) INJECTION PRN
Status: DISCONTINUED | OUTPATIENT
Start: 2024-06-13 | End: 2024-06-20 | Stop reason: HOSPADM

## 2024-06-13 RX ORDER — ACETAMINOPHEN 650 MG/1
650 SUPPOSITORY RECTAL EVERY 6 HOURS PRN
Status: DISCONTINUED | OUTPATIENT
Start: 2024-06-13 | End: 2024-06-20 | Stop reason: HOSPADM

## 2024-06-13 RX ORDER — SODIUM CHLORIDE, SODIUM LACTATE, POTASSIUM CHLORIDE, AND CALCIUM CHLORIDE .6; .31; .03; .02 G/100ML; G/100ML; G/100ML; G/100ML
1000 INJECTION, SOLUTION INTRAVENOUS ONCE
Status: COMPLETED | OUTPATIENT
Start: 2024-06-13 | End: 2024-06-13

## 2024-06-13 RX ADMIN — VANCOMYCIN HYDROCHLORIDE 1500 MG: 10 INJECTION, POWDER, LYOPHILIZED, FOR SOLUTION INTRAVENOUS at 23:06

## 2024-06-13 RX ADMIN — KETOROLAC TROMETHAMINE 15 MG: 15 INJECTION, SOLUTION INTRAMUSCULAR; INTRAVENOUS at 16:52

## 2024-06-13 RX ADMIN — IOPAMIDOL 75 ML: 755 INJECTION, SOLUTION INTRAVENOUS at 07:24

## 2024-06-13 RX ADMIN — CEFEPIME HYDROCHLORIDE 2000 MG: 2 INJECTION, POWDER, FOR SOLUTION INTRAVENOUS at 17:35

## 2024-06-13 RX ADMIN — VANCOMYCIN HYDROCHLORIDE 2500 MG: 1 INJECTION, POWDER, LYOPHILIZED, FOR SOLUTION INTRAVENOUS at 10:27

## 2024-06-13 RX ADMIN — ENOXAPARIN SODIUM 40 MG: 100 INJECTION SUBCUTANEOUS at 11:02

## 2024-06-13 RX ADMIN — IPRATROPIUM BROMIDE AND ALBUTEROL SULFATE 1 DOSE: .5; 2.5 SOLUTION RESPIRATORY (INHALATION) at 17:17

## 2024-06-13 RX ADMIN — IPRATROPIUM BROMIDE AND ALBUTEROL SULFATE 1 DOSE: 2.5; .5 SOLUTION RESPIRATORY (INHALATION) at 19:54

## 2024-06-13 RX ADMIN — SODIUM CHLORIDE, POTASSIUM CHLORIDE, SODIUM LACTATE AND CALCIUM CHLORIDE 1000 ML: 600; 310; 30; 20 INJECTION, SOLUTION INTRAVENOUS at 05:43

## 2024-06-13 RX ADMIN — CEFEPIME 2000 MG: 2 INJECTION, POWDER, FOR SOLUTION INTRAVENOUS at 09:49

## 2024-06-13 RX ADMIN — ENOXAPARIN SODIUM 40 MG: 100 INJECTION SUBCUTANEOUS at 21:00

## 2024-06-13 ASSESSMENT — PAIN SCALES - GENERAL
PAINLEVEL_OUTOF10: 4
PAINLEVEL_OUTOF10: 0
PAINLEVEL_OUTOF10: 2

## 2024-06-13 ASSESSMENT — PAIN DESCRIPTION - DESCRIPTORS
DESCRIPTORS: ACHING
DESCRIPTORS: ACHING

## 2024-06-13 ASSESSMENT — PAIN - FUNCTIONAL ASSESSMENT
PAIN_FUNCTIONAL_ASSESSMENT: 0-10
PAIN_FUNCTIONAL_ASSESSMENT: ACTIVITIES ARE NOT PREVENTED
PAIN_FUNCTIONAL_ASSESSMENT: ACTIVITIES ARE NOT PREVENTED

## 2024-06-13 ASSESSMENT — PAIN DESCRIPTION - LOCATION
LOCATION: HEAD
LOCATION: CHEST

## 2024-06-13 ASSESSMENT — PAIN DESCRIPTION - PAIN TYPE: TYPE: ACUTE PAIN

## 2024-06-13 ASSESSMENT — LIFESTYLE VARIABLES
HOW MANY STANDARD DRINKS CONTAINING ALCOHOL DO YOU HAVE ON A TYPICAL DAY: PATIENT DOES NOT DRINK
HOW OFTEN DO YOU HAVE A DRINK CONTAINING ALCOHOL: NEVER

## 2024-06-13 ASSESSMENT — PAIN DESCRIPTION - ONSET: ONSET: ON-GOING

## 2024-06-13 ASSESSMENT — PAIN DESCRIPTION - ORIENTATION: ORIENTATION: RIGHT

## 2024-06-13 ASSESSMENT — PAIN DESCRIPTION - FREQUENCY: FREQUENCY: CONTINUOUS

## 2024-06-13 NOTE — ED NOTES
0818-Perfect Serve sent by Dr. Vargas to Dr. Amezcua Hospitalist for consult.   0858-Consult completed to Hospitalist admission orders placed by Nhung Mena NP.

## 2024-06-13 NOTE — H&P
Hospital Medicine History & Physical      PCP: Eddie Calvillo DO    Date of Admission: 6/13/2024    Date of Service: Pt seen/examined on 06/13/24     Chief Complaint:    Chief Complaint   Patient presents with    Illness     Pt arrives with c/o headache, fever, cough, dizziness x 1 week. Pt states 3 weeks ago he was admitted for PNE and went home on abx. Pt seen PCP Tuesday and PNE still showed on right lower lobe, pt states he was started on doxycycline but still feels ill.          History Of Present Illness:      The patient is a 39 y.o. male who presents to [facility] with multiple complaints: headache, fever, cough, and dizziness for over a week. He was admitted to [facility] 3 weeks ago, overnight for pneumonia. Was placed on Zithromax and then Levaquin. He does report he was feeling better until this week. Saw his PCP this past Tuesday and had a chest x ray performed that showed persistent pneumonia. He was placed on doxycycline. He reports taking medication as prescribed but continues to feel unwell. Fevers reaching up to 102 at home, cough and mild dyspnea. He denies any chest pain, nausea or vomiting. He reports drinking a lot of water yesterday but appetite overall decreased. 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 His coworker is also sick with similar symptoms.       Past Medical History:        Diagnosis Date    COPD (chronic obstructive pulmonary disease) (HCC)     Elevated LFTs     GERD (gastroesophageal reflux disease)     Gout     HLD (hyperlipidemia)     HTN (hypertension)     CHIDI (obstructive sleep apnea)     Vitamin D deficiency        Past Surgical History:        Procedure Laterality Date    CHOLECYSTECTOMY      OTHER SURGICAL HISTORY Left 2022    tendon repair in hand       Medications Prior to Admission:    Prior to Admission medications    Medication Sig Start Date End Date Taking? Authorizing Provider   losartan (COZAAR) 100 MG tablet

## 2024-06-13 NOTE — ED PROVIDER NOTES
I assumed care at shift change pending CT scan.  Per chart review, patient failed outpatient treatment with Levaquin then doxycycline.  Blood cultures obtained. Put in vancomycin and cefepime.    Hospitalist consulted for admission.  Updated patient.      Krysta Vargas MD  06/13/24 5522    
Expectation of Test or Tx.):     Patient will be signed out to the oncoming medical provider pending results of CT chest.  It is anticipated that patient will likely require admission due to failure of outpatient therapy for management of pneumonia and the development of possible parapneumonic effusion that could require thoracentesis.    I am the Primary Clinician of Record.    FINAL IMPRESSION      1. Pneumonia of right lower lobe due to infectious organism    2. Parapneumonic effusion    3. Tachycardia    4. Hyponatremia          DISPOSITION/PLAN     DISPOSITION  pending CT chest and ultimate disposition      PATIENT REFERRED TO:  No follow-up provider specified.    DISCHARGE MEDICATIONS:  Patient was given scripts for the following medications. I counseled patient how to take these medications:  New Prescriptions    No medications on file       DISCONTINUED MEDICATIONS:  Discontinued Medications    No medications on file              (This chart was generated in part by using Dragon Dictation system and may contain errors related to that system including errors in grammar, punctuation, and spelling, as well as words and phrases that may be inappropriate. If there are any questions or concerns please feel free to contact the dictating provider for clarification.)    MD Carrington Ponce Benjamin, MD  06/13/24 0631       Rafa Shultz MD  06/13/24 0601

## 2024-06-14 PROBLEM — I10 PRIMARY HYPERTENSION: Status: ACTIVE | Noted: 2024-06-14

## 2024-06-14 LAB
ANION GAP SERPL CALCULATED.3IONS-SCNC: 11 MMOL/L (ref 3–16)
ANION GAP SERPL CALCULATED.3IONS-SCNC: 11 MMOL/L (ref 3–16)
ANION GAP SERPL CALCULATED.3IONS-SCNC: 12 MMOL/L (ref 3–16)
BASOPHILS # BLD: 0 K/UL (ref 0–0.2)
BASOPHILS NFR BLD: 0.2 %
BUN SERPL-MCNC: 14 MG/DL (ref 7–20)
BUN SERPL-MCNC: 15 MG/DL (ref 7–20)
CALCIUM SERPL-MCNC: 8.4 MG/DL (ref 8.3–10.6)
CALCIUM SERPL-MCNC: 8.7 MG/DL (ref 8.3–10.6)
CHLORIDE SERPL-SCNC: 98 MMOL/L (ref 99–110)
CHLORIDE SERPL-SCNC: 99 MMOL/L (ref 99–110)
CHLORIDE SERPL-SCNC: 99 MMOL/L (ref 99–110)
CO2 SERPL-SCNC: 25 MMOL/L (ref 21–32)
CO2 SERPL-SCNC: 25 MMOL/L (ref 21–32)
CO2 SERPL-SCNC: 26 MMOL/L (ref 21–32)
CREAT SERPL-MCNC: 0.9 MG/DL (ref 0.9–1.3)
CREAT SERPL-MCNC: 1 MG/DL (ref 0.9–1.3)
CREAT SERPL-MCNC: 1 MG/DL (ref 0.9–1.3)
CRP SERPL-MCNC: 256.8 MG/L (ref 0–5.1)
DEPRECATED RDW RBC AUTO: 13.5 % (ref 12.4–15.4)
EOSINOPHIL # BLD: 0 K/UL (ref 0–0.6)
EOSINOPHIL NFR BLD: 0.3 %
ERYTHROCYTE [SEDIMENTATION RATE] IN BLOOD BY WESTERGREN METHOD: 56 MM/HR (ref 0–15)
EST. AVERAGE GLUCOSE BLD GHB EST-MCNC: 96.8 MG/DL
GFR SERPLBLD CREATININE-BSD FMLA CKD-EPI: >90 ML/MIN/{1.73_M2}
GLUCOSE SERPL-MCNC: 110 MG/DL (ref 70–99)
GLUCOSE SERPL-MCNC: 113 MG/DL (ref 70–99)
GLUCOSE SERPL-MCNC: 116 MG/DL (ref 70–99)
HBA1C MFR BLD: 5 %
HCT VFR BLD AUTO: 31.4 % (ref 40.5–52.5)
HGB BLD-MCNC: 10.6 G/DL (ref 13.5–17.5)
LEGIONELLA AG UR QL: NORMAL
LYMPHOCYTES # BLD: 0.8 K/UL (ref 1–5.1)
LYMPHOCYTES NFR BLD: 8.7 %
MCH RBC QN AUTO: 31.1 PG (ref 26–34)
MCHC RBC AUTO-ENTMCNC: 33.9 G/DL (ref 31–36)
MCV RBC AUTO: 91.6 FL (ref 80–100)
MONOCYTES # BLD: 0.7 K/UL (ref 0–1.3)
MONOCYTES NFR BLD: 8.2 %
MRSA DNA SPEC QL NAA+PROBE: NORMAL
NEUTROPHILS # BLD: 7.6 K/UL (ref 1.7–7.7)
NEUTROPHILS NFR BLD: 82.6 %
ORGANISM: ABNORMAL
ORGANISM: ABNORMAL
PLATELET # BLD AUTO: 267 K/UL (ref 135–450)
PMV BLD AUTO: 8.2 FL (ref 5–10.5)
POTASSIUM SERPL-SCNC: 4.2 MMOL/L (ref 3.5–5.1)
REPORT: NORMAL
RESP PATH DNA+RNA PNL L RESP NAA+NON-PRB: ABNORMAL
RESP PATH DNA+RNA PNL L RESP NAA+NON-PRB: ABNORMAL
S PNEUM AG UR QL: NORMAL
SODIUM SERPL-SCNC: 136 MMOL/L (ref 136–145)
SODIUM SERPL-SCNC: 136 MMOL/L (ref 136–145)
VANCOMYCIN TROUGH SERPL-MCNC: 12.1 UG/ML (ref 10–20)
WBC # BLD AUTO: 9.1 K/UL (ref 4–11)

## 2024-06-14 PROCEDURE — 80048 BASIC METABOLIC PNL TOTAL CA: CPT

## 2024-06-14 PROCEDURE — 86140 C-REACTIVE PROTEIN: CPT

## 2024-06-14 PROCEDURE — 99233 SBSQ HOSP IP/OBS HIGH 50: CPT | Performed by: INTERNAL MEDICINE

## 2024-06-14 PROCEDURE — 94660 CPAP INITIATION&MGMT: CPT

## 2024-06-14 PROCEDURE — 87449 NOS EACH ORGANISM AG IA: CPT

## 2024-06-14 PROCEDURE — 36415 COLL VENOUS BLD VENIPUNCTURE: CPT

## 2024-06-14 PROCEDURE — 6360000002 HC RX W HCPCS: Performed by: INTERNAL MEDICINE

## 2024-06-14 PROCEDURE — 99232 SBSQ HOSP IP/OBS MODERATE 35: CPT | Performed by: INTERNAL MEDICINE

## 2024-06-14 PROCEDURE — 87633 RESP VIRUS 12-25 TARGETS: CPT

## 2024-06-14 PROCEDURE — 2580000003 HC RX 258: Performed by: NURSE PRACTITIONER

## 2024-06-14 PROCEDURE — 2580000003 HC RX 258: Performed by: INTERNAL MEDICINE

## 2024-06-14 PROCEDURE — 85025 COMPLETE CBC W/AUTO DIFF WBC: CPT

## 2024-06-14 PROCEDURE — 6370000000 HC RX 637 (ALT 250 FOR IP)

## 2024-06-14 PROCEDURE — 85652 RBC SED RATE AUTOMATED: CPT

## 2024-06-14 PROCEDURE — 6370000000 HC RX 637 (ALT 250 FOR IP): Performed by: INTERNAL MEDICINE

## 2024-06-14 PROCEDURE — 6370000000 HC RX 637 (ALT 250 FOR IP): Performed by: NURSE PRACTITIONER

## 2024-06-14 PROCEDURE — 94640 AIRWAY INHALATION TREATMENT: CPT

## 2024-06-14 PROCEDURE — 6360000002 HC RX W HCPCS: Performed by: NURSE PRACTITIONER

## 2024-06-14 PROCEDURE — 1200000000 HC SEMI PRIVATE

## 2024-06-14 PROCEDURE — 94761 N-INVAS EAR/PLS OXIMETRY MLT: CPT

## 2024-06-14 PROCEDURE — 80202 ASSAY OF VANCOMYCIN: CPT

## 2024-06-14 RX ORDER — IPRATROPIUM BROMIDE AND ALBUTEROL SULFATE 2.5; .5 MG/3ML; MG/3ML
1 SOLUTION RESPIRATORY (INHALATION)
Status: DISCONTINUED | OUTPATIENT
Start: 2024-06-14 | End: 2024-06-16

## 2024-06-14 RX ORDER — AMOXICILLIN AND CLAVULANATE POTASSIUM 875; 125 MG/1; MG/1
1 TABLET, FILM COATED ORAL EVERY 12 HOURS SCHEDULED
Status: DISCONTINUED | OUTPATIENT
Start: 2024-06-14 | End: 2024-06-14

## 2024-06-14 RX ADMIN — SODIUM CHLORIDE, PRESERVATIVE FREE 10 ML: 5 INJECTION INTRAVENOUS at 08:43

## 2024-06-14 RX ADMIN — CEFEPIME 2000 MG: 2 INJECTION, POWDER, FOR SOLUTION INTRAVENOUS at 20:26

## 2024-06-14 RX ADMIN — BUDESONIDE INHALATION 500 MCG: 0.5 SUSPENSION RESPIRATORY (INHALATION) at 07:18

## 2024-06-14 RX ADMIN — SODIUM CHLORIDE, PRESERVATIVE FREE 10 ML: 5 INJECTION INTRAVENOUS at 20:23

## 2024-06-14 RX ADMIN — ENOXAPARIN SODIUM 40 MG: 100 INJECTION SUBCUTANEOUS at 08:40

## 2024-06-14 RX ADMIN — IPRATROPIUM BROMIDE AND ALBUTEROL SULFATE 1 DOSE: 2.5; .5 SOLUTION RESPIRATORY (INHALATION) at 07:18

## 2024-06-14 RX ADMIN — VANCOMYCIN HYDROCHLORIDE 1500 MG: 10 INJECTION, POWDER, LYOPHILIZED, FOR SOLUTION INTRAVENOUS at 17:07

## 2024-06-14 RX ADMIN — BUDESONIDE INHALATION 500 MCG: 0.5 SUSPENSION RESPIRATORY (INHALATION) at 19:24

## 2024-06-14 RX ADMIN — IPRATROPIUM BROMIDE AND ALBUTEROL SULFATE 1 DOSE: 2.5; .5 SOLUTION RESPIRATORY (INHALATION) at 11:11

## 2024-06-14 RX ADMIN — VANCOMYCIN HYDROCHLORIDE 1250 MG: 10 INJECTION, POWDER, LYOPHILIZED, FOR SOLUTION INTRAVENOUS at 09:51

## 2024-06-14 RX ADMIN — ENOXAPARIN SODIUM 40 MG: 100 INJECTION SUBCUTANEOUS at 20:23

## 2024-06-14 RX ADMIN — ACETAMINOPHEN 650 MG: 325 TABLET ORAL at 14:19

## 2024-06-14 RX ADMIN — LOSARTAN POTASSIUM 100 MG: 100 TABLET, FILM COATED ORAL at 08:41

## 2024-06-14 RX ADMIN — CEFEPIME HYDROCHLORIDE 2000 MG: 2 INJECTION, POWDER, FOR SOLUTION INTRAVENOUS at 06:27

## 2024-06-14 RX ADMIN — IPRATROPIUM BROMIDE AND ALBUTEROL SULFATE 1 DOSE: 2.5; .5 SOLUTION RESPIRATORY (INHALATION) at 19:24

## 2024-06-14 RX ADMIN — CEFEPIME 2000 MG: 2 INJECTION, POWDER, FOR SOLUTION INTRAVENOUS at 14:21

## 2024-06-14 RX ADMIN — IPRATROPIUM BROMIDE AND ALBUTEROL SULFATE 1 DOSE: 2.5; .5 SOLUTION RESPIRATORY (INHALATION) at 14:55

## 2024-06-15 PROBLEM — R00.0 TACHYCARDIA: Status: ACTIVE | Noted: 2024-06-15

## 2024-06-15 LAB
ANION GAP SERPL CALCULATED.3IONS-SCNC: 11 MMOL/L (ref 3–16)
ANION GAP SERPL CALCULATED.3IONS-SCNC: 12 MMOL/L (ref 3–16)
ANION GAP SERPL CALCULATED.3IONS-SCNC: 9 MMOL/L (ref 3–16)
BACTERIA SPEC RESP CULT: NORMAL
BASOPHILS # BLD: 0 K/UL (ref 0–0.2)
BASOPHILS NFR BLD: 0.2 %
BUN SERPL-MCNC: 10 MG/DL (ref 7–20)
BUN SERPL-MCNC: 10 MG/DL (ref 7–20)
BUN SERPL-MCNC: 9 MG/DL (ref 7–20)
CALCIUM SERPL-MCNC: 8.7 MG/DL (ref 8.3–10.6)
CALCIUM SERPL-MCNC: 8.9 MG/DL (ref 8.3–10.6)
CHLORIDE SERPL-SCNC: 100 MMOL/L (ref 99–110)
CHLORIDE SERPL-SCNC: 99 MMOL/L (ref 99–110)
CO2 SERPL-SCNC: 24 MMOL/L (ref 21–32)
CO2 SERPL-SCNC: 24 MMOL/L (ref 21–32)
CO2 SERPL-SCNC: 27 MMOL/L (ref 21–32)
CREAT SERPL-MCNC: 1 MG/DL (ref 0.9–1.3)
CREAT SERPL-MCNC: 1 MG/DL (ref 0.9–1.3)
DEPRECATED RDW RBC AUTO: 13.3 % (ref 12.4–15.4)
EOSINOPHIL # BLD: 0 K/UL (ref 0–0.6)
EOSINOPHIL NFR BLD: 0.2 %
GFR SERPLBLD CREATININE-BSD FMLA CKD-EPI: >90 ML/MIN/{1.73_M2}
GLUCOSE SERPL-MCNC: 107 MG/DL (ref 70–99)
GLUCOSE SERPL-MCNC: 126 MG/DL (ref 70–99)
GLUCOSE SERPL-MCNC: 127 MG/DL (ref 70–99)
GRAM STN SPEC: NORMAL
HCT VFR BLD AUTO: 29.9 % (ref 40.5–52.5)
HGB BLD-MCNC: 10.2 G/DL (ref 13.5–17.5)
LEGIONELLA AG UR QL: NORMAL
LYMPHOCYTES # BLD: 0.9 K/UL (ref 1–5.1)
LYMPHOCYTES NFR BLD: 12.4 %
MCH RBC QN AUTO: 31.2 PG (ref 26–34)
MCHC RBC AUTO-ENTMCNC: 34.1 G/DL (ref 31–36)
MONOCYTES # BLD: 0.5 K/UL (ref 0–1.3)
MONOCYTES NFR BLD: 7.5 %
NEUTROPHILS # BLD: 5.4 K/UL (ref 1.7–7.7)
NEUTROPHILS NFR BLD: 79.7 %
PLATELET # BLD AUTO: 254 K/UL (ref 135–450)
POTASSIUM SERPL-SCNC: 3.6 MMOL/L (ref 3.5–5.1)
POTASSIUM SERPL-SCNC: 3.9 MMOL/L (ref 3.5–5.1)
POTASSIUM SERPL-SCNC: 4.2 MMOL/L (ref 3.5–5.1)
RBC # BLD AUTO: 3.27 M/UL (ref 4.2–5.9)
S PNEUM AG UR QL: NORMAL
SODIUM SERPL-SCNC: 135 MMOL/L (ref 136–145)
SODIUM SERPL-SCNC: 135 MMOL/L (ref 136–145)
SODIUM SERPL-SCNC: 136 MMOL/L (ref 136–145)
VANCOMYCIN SERPL-MCNC: 12.3 UG/ML
WBC # BLD AUTO: 6.8 K/UL (ref 4–11)

## 2024-06-15 PROCEDURE — 99232 SBSQ HOSP IP/OBS MODERATE 35: CPT | Performed by: INTERNAL MEDICINE

## 2024-06-15 PROCEDURE — 80202 ASSAY OF VANCOMYCIN: CPT

## 2024-06-15 PROCEDURE — 6360000002 HC RX W HCPCS: Performed by: NURSE PRACTITIONER

## 2024-06-15 PROCEDURE — 6370000000 HC RX 637 (ALT 250 FOR IP)

## 2024-06-15 PROCEDURE — 2700000000 HC OXYGEN THERAPY PER DAY

## 2024-06-15 PROCEDURE — 36415 COLL VENOUS BLD VENIPUNCTURE: CPT

## 2024-06-15 PROCEDURE — 94660 CPAP INITIATION&MGMT: CPT

## 2024-06-15 PROCEDURE — 1200000000 HC SEMI PRIVATE

## 2024-06-15 PROCEDURE — 94640 AIRWAY INHALATION TREATMENT: CPT

## 2024-06-15 PROCEDURE — 6370000000 HC RX 637 (ALT 250 FOR IP): Performed by: INTERNAL MEDICINE

## 2024-06-15 PROCEDURE — 99233 SBSQ HOSP IP/OBS HIGH 50: CPT | Performed by: INTERNAL MEDICINE

## 2024-06-15 PROCEDURE — 94761 N-INVAS EAR/PLS OXIMETRY MLT: CPT

## 2024-06-15 PROCEDURE — 2580000003 HC RX 258: Performed by: NURSE PRACTITIONER

## 2024-06-15 PROCEDURE — 6360000002 HC RX W HCPCS: Performed by: INTERNAL MEDICINE

## 2024-06-15 PROCEDURE — 85025 COMPLETE CBC W/AUTO DIFF WBC: CPT

## 2024-06-15 PROCEDURE — 80048 BASIC METABOLIC PNL TOTAL CA: CPT

## 2024-06-15 PROCEDURE — 2580000003 HC RX 258: Performed by: INTERNAL MEDICINE

## 2024-06-15 PROCEDURE — 6360000002 HC RX W HCPCS

## 2024-06-15 RX ORDER — PREDNISONE 20 MG/1
40 TABLET ORAL DAILY
Status: COMPLETED | OUTPATIENT
Start: 2024-06-15 | End: 2024-06-19

## 2024-06-15 RX ADMIN — CEFEPIME 2000 MG: 2 INJECTION, POWDER, FOR SOLUTION INTRAVENOUS at 14:15

## 2024-06-15 RX ADMIN — SODIUM CHLORIDE, PRESERVATIVE FREE 10 ML: 5 INJECTION INTRAVENOUS at 21:42

## 2024-06-15 RX ADMIN — CEFEPIME 2000 MG: 2 INJECTION, POWDER, FOR SOLUTION INTRAVENOUS at 06:38

## 2024-06-15 RX ADMIN — ENOXAPARIN SODIUM 40 MG: 100 INJECTION SUBCUTANEOUS at 09:02

## 2024-06-15 RX ADMIN — SODIUM CHLORIDE: 9 INJECTION, SOLUTION INTRAVENOUS at 14:12

## 2024-06-15 RX ADMIN — KETOROLAC TROMETHAMINE 15 MG: 15 INJECTION, SOLUTION INTRAMUSCULAR; INTRAVENOUS at 00:59

## 2024-06-15 RX ADMIN — ENOXAPARIN SODIUM 40 MG: 100 INJECTION SUBCUTANEOUS at 21:42

## 2024-06-15 RX ADMIN — BUDESONIDE INHALATION 500 MCG: 0.5 SUSPENSION RESPIRATORY (INHALATION) at 07:17

## 2024-06-15 RX ADMIN — PREDNISONE 40 MG: 20 TABLET ORAL at 11:30

## 2024-06-15 RX ADMIN — IPRATROPIUM BROMIDE AND ALBUTEROL SULFATE 1 DOSE: 2.5; .5 SOLUTION RESPIRATORY (INHALATION) at 20:09

## 2024-06-15 RX ADMIN — LOSARTAN POTASSIUM 100 MG: 100 TABLET, FILM COATED ORAL at 09:02

## 2024-06-15 RX ADMIN — IPRATROPIUM BROMIDE AND ALBUTEROL SULFATE 1 DOSE: 2.5; .5 SOLUTION RESPIRATORY (INHALATION) at 15:06

## 2024-06-15 RX ADMIN — CEFEPIME 2000 MG: 2 INJECTION, POWDER, FOR SOLUTION INTRAVENOUS at 21:41

## 2024-06-15 RX ADMIN — BUDESONIDE INHALATION 500 MCG: 0.5 SUSPENSION RESPIRATORY (INHALATION) at 20:09

## 2024-06-15 RX ADMIN — IPRATROPIUM BROMIDE AND ALBUTEROL SULFATE 1 DOSE: 2.5; .5 SOLUTION RESPIRATORY (INHALATION) at 07:17

## 2024-06-15 RX ADMIN — IPRATROPIUM BROMIDE AND ALBUTEROL SULFATE 1 DOSE: 2.5; .5 SOLUTION RESPIRATORY (INHALATION) at 11:14

## 2024-06-15 RX ADMIN — VANCOMYCIN HYDROCHLORIDE 1500 MG: 10 INJECTION, POWDER, LYOPHILIZED, FOR SOLUTION INTRAVENOUS at 02:01

## 2024-06-15 RX ADMIN — VANCOMYCIN HYDROCHLORIDE 1500 MG: 10 INJECTION, POWDER, LYOPHILIZED, FOR SOLUTION INTRAVENOUS at 10:21

## 2024-06-15 ASSESSMENT — PAIN DESCRIPTION - ONSET: ONSET: GRADUAL

## 2024-06-15 ASSESSMENT — PAIN DESCRIPTION - FREQUENCY: FREQUENCY: CONTINUOUS

## 2024-06-15 ASSESSMENT — PAIN DESCRIPTION - PAIN TYPE: TYPE: CHRONIC PAIN

## 2024-06-15 ASSESSMENT — PAIN DESCRIPTION - LOCATION: LOCATION: FOOT;KNEE

## 2024-06-15 ASSESSMENT — PAIN DESCRIPTION - ORIENTATION: ORIENTATION: RIGHT;LEFT

## 2024-06-15 ASSESSMENT — PAIN SCALES - GENERAL: PAINLEVEL_OUTOF10: 4

## 2024-06-15 ASSESSMENT — PAIN - FUNCTIONAL ASSESSMENT: PAIN_FUNCTIONAL_ASSESSMENT: ACTIVITIES ARE NOT PREVENTED

## 2024-06-15 ASSESSMENT — PAIN DESCRIPTION - DESCRIPTORS: DESCRIPTORS: ACHING

## 2024-06-15 NOTE — FLOWSHEET NOTE
06/14/24 2015   Vital Signs   Temp 98.6 °F (37 °C)   Temp Source Oral   Pulse (!) 106   Heart Rate Source Monitor   Respirations 18   /62   MAP (Calculated) 83   BP Location Left upper arm   BP Method Automatic   Patient Position Semi fowlers   Oxygen Therapy   SpO2 96 %   O2 Device None (Room air)     Pt A/O assessment completed. Meds given per MAR. Pt states his gout is flaring  up and he normally takes Prednisone. Informed pt that I can message Dr but pt doesn't not want RN to do so at this time Pt denies any needs at this time. Call light within reach

## 2024-06-16 ENCOUNTER — APPOINTMENT (OUTPATIENT)
Dept: GENERAL RADIOLOGY | Age: 39
DRG: 871 | End: 2024-06-16
Payer: COMMERCIAL

## 2024-06-16 PROBLEM — R06.02 SHORTNESS OF BREATH: Status: ACTIVE | Noted: 2024-06-16

## 2024-06-16 LAB
ANION GAP SERPL CALCULATED.3IONS-SCNC: 10 MMOL/L (ref 3–16)
ANION GAP SERPL CALCULATED.3IONS-SCNC: 12 MMOL/L (ref 3–16)
BASOPHILS # BLD: 0 K/UL (ref 0–0.2)
BASOPHILS NFR BLD: 0.3 %
BUN SERPL-MCNC: 12 MG/DL (ref 7–20)
BUN SERPL-MCNC: 13 MG/DL (ref 7–20)
CALCIUM SERPL-MCNC: 8.7 MG/DL (ref 8.3–10.6)
CHLORIDE SERPL-SCNC: 100 MMOL/L (ref 99–110)
CHLORIDE SERPL-SCNC: 100 MMOL/L (ref 99–110)
CO2 SERPL-SCNC: 25 MMOL/L (ref 21–32)
CO2 SERPL-SCNC: 25 MMOL/L (ref 21–32)
CREAT SERPL-MCNC: 0.8 MG/DL (ref 0.9–1.3)
CREAT SERPL-MCNC: 0.8 MG/DL (ref 0.9–1.3)
EOSINOPHIL # BLD: 0 K/UL (ref 0–0.6)
EOSINOPHIL NFR BLD: 0.2 %
GFR SERPLBLD CREATININE-BSD FMLA CKD-EPI: >90 ML/MIN/{1.73_M2}
GFR SERPLBLD CREATININE-BSD FMLA CKD-EPI: >90 ML/MIN/{1.73_M2}
GLUCOSE SERPL-MCNC: 119 MG/DL (ref 70–99)
GLUCOSE SERPL-MCNC: 120 MG/DL (ref 70–99)
LYMPHOCYTES # BLD: 0.8 K/UL (ref 1–5.1)
LYMPHOCYTES NFR BLD: 11 %
MCV RBC AUTO: 89.7 FL (ref 80–100)
MONOCYTES # BLD: 0.7 K/UL (ref 0–1.3)
MONOCYTES NFR BLD: 9.7 %
NEUTROPHILS # BLD: 5.6 K/UL (ref 1.7–7.7)
NEUTROPHILS NFR BLD: 78.8 %
PLATELET # BLD AUTO: 249 K/UL (ref 135–450)
POTASSIUM SERPL-SCNC: 3.7 MMOL/L (ref 3.5–5.1)
POTASSIUM SERPL-SCNC: 4.2 MMOL/L (ref 3.5–5.1)
RBC # BLD AUTO: 3.19 M/UL (ref 4.2–5.9)
SODIUM SERPL-SCNC: 135 MMOL/L (ref 136–145)
WBC # BLD AUTO: 7.2 K/UL (ref 4–11)

## 2024-06-16 PROCEDURE — 80048 BASIC METABOLIC PNL TOTAL CA: CPT

## 2024-06-16 PROCEDURE — 6370000000 HC RX 637 (ALT 250 FOR IP): Performed by: INTERNAL MEDICINE

## 2024-06-16 PROCEDURE — 1200000000 HC SEMI PRIVATE

## 2024-06-16 PROCEDURE — 6360000002 HC RX W HCPCS: Performed by: NURSE PRACTITIONER

## 2024-06-16 PROCEDURE — 6370000000 HC RX 637 (ALT 250 FOR IP)

## 2024-06-16 PROCEDURE — 94660 CPAP INITIATION&MGMT: CPT

## 2024-06-16 PROCEDURE — 36415 COLL VENOUS BLD VENIPUNCTURE: CPT

## 2024-06-16 PROCEDURE — 71045 X-RAY EXAM CHEST 1 VIEW: CPT

## 2024-06-16 PROCEDURE — 85025 COMPLETE CBC W/AUTO DIFF WBC: CPT

## 2024-06-16 PROCEDURE — 99232 SBSQ HOSP IP/OBS MODERATE 35: CPT | Performed by: NURSE PRACTITIONER

## 2024-06-16 PROCEDURE — 99233 SBSQ HOSP IP/OBS HIGH 50: CPT | Performed by: INTERNAL MEDICINE

## 2024-06-16 PROCEDURE — 6360000002 HC RX W HCPCS: Performed by: INTERNAL MEDICINE

## 2024-06-16 PROCEDURE — 2580000003 HC RX 258: Performed by: NURSE PRACTITIONER

## 2024-06-16 PROCEDURE — 94761 N-INVAS EAR/PLS OXIMETRY MLT: CPT

## 2024-06-16 PROCEDURE — 94640 AIRWAY INHALATION TREATMENT: CPT

## 2024-06-16 PROCEDURE — 2580000003 HC RX 258: Performed by: INTERNAL MEDICINE

## 2024-06-16 RX ORDER — IPRATROPIUM BROMIDE AND ALBUTEROL SULFATE 2.5; .5 MG/3ML; MG/3ML
1 SOLUTION RESPIRATORY (INHALATION) 2 TIMES DAILY
Status: DISCONTINUED | OUTPATIENT
Start: 2024-06-16 | End: 2024-06-20 | Stop reason: HOSPADM

## 2024-06-16 RX ADMIN — ENOXAPARIN SODIUM 40 MG: 100 INJECTION SUBCUTANEOUS at 07:41

## 2024-06-16 RX ADMIN — BUDESONIDE INHALATION 500 MCG: 0.5 SUSPENSION RESPIRATORY (INHALATION) at 07:16

## 2024-06-16 RX ADMIN — PREDNISONE 40 MG: 20 TABLET ORAL at 07:41

## 2024-06-16 RX ADMIN — SODIUM CHLORIDE, PRESERVATIVE FREE 5 ML: 5 INJECTION INTRAVENOUS at 20:28

## 2024-06-16 RX ADMIN — SODIUM CHLORIDE: 9 INJECTION, SOLUTION INTRAVENOUS at 14:50

## 2024-06-16 RX ADMIN — LOSARTAN POTASSIUM 100 MG: 100 TABLET, FILM COATED ORAL at 07:41

## 2024-06-16 RX ADMIN — CEFEPIME 2000 MG: 2 INJECTION, POWDER, FOR SOLUTION INTRAVENOUS at 22:08

## 2024-06-16 RX ADMIN — SODIUM CHLORIDE 100 MG: 9 INJECTION, SOLUTION INTRAVENOUS at 14:10

## 2024-06-16 RX ADMIN — BUDESONIDE INHALATION 500 MCG: 0.5 SUSPENSION RESPIRATORY (INHALATION) at 19:23

## 2024-06-16 RX ADMIN — CEFEPIME 2000 MG: 2 INJECTION, POWDER, FOR SOLUTION INTRAVENOUS at 06:16

## 2024-06-16 RX ADMIN — ENOXAPARIN SODIUM 40 MG: 100 INJECTION SUBCUTANEOUS at 20:27

## 2024-06-16 RX ADMIN — IPRATROPIUM BROMIDE AND ALBUTEROL SULFATE 1 DOSE: 2.5; .5 SOLUTION RESPIRATORY (INHALATION) at 11:00

## 2024-06-16 RX ADMIN — CEFEPIME 2000 MG: 2 INJECTION, POWDER, FOR SOLUTION INTRAVENOUS at 14:54

## 2024-06-16 RX ADMIN — IPRATROPIUM BROMIDE AND ALBUTEROL SULFATE 1 DOSE: 2.5; .5 SOLUTION RESPIRATORY (INHALATION) at 07:16

## 2024-06-16 RX ADMIN — IPRATROPIUM BROMIDE AND ALBUTEROL SULFATE 1 DOSE: 2.5; .5 SOLUTION RESPIRATORY (INHALATION) at 19:23

## 2024-06-16 ASSESSMENT — PAIN SCALES - GENERAL: PAINLEVEL_OUTOF10: 0

## 2024-06-16 NOTE — FLOWSHEET NOTE
06/16/24 0740   Vital Signs   Temp 97.6 °F (36.4 °C)   Temp Source Oral   Pulse 89   Heart Rate Source Monitor   Respirations 18   /74   MAP (Calculated) 94   BP Location Left upper arm   BP Method Automatic   Patient Position Sitting   Pain Assessment   Pain Assessment None - Denies Pain   Oxygen Therapy   SpO2 93 %   O2 Device None (Room air)     AM assessment complete. Gave am meds due see mar. A/O x 4. Denies pain. Up as tolerated-independent. Pt states \"finally actually have an appetite.\" Tray table cleaned. Bed locked/lowest position. Call light within reach.

## 2024-06-16 NOTE — FLOWSHEET NOTE
06/15/24 2133   Vital Signs   Temp 97.9 °F (36.6 °C)   Temp Source Oral   Pulse 90   Heart Rate Source Monitor   Respirations 18   BP (!) 122/57   MAP (Calculated) 79   BP Location Right upper arm   BP Method Automatic   Patient Position Semi fowlers   Oxygen Therapy   SpO2 93 %   O2 Device None (Room air)     Pt A/O assessment completed. Meds given per MAR. Pt denies any  needs at this time,. Call light within reach

## 2024-06-17 ENCOUNTER — APPOINTMENT (OUTPATIENT)
Dept: ULTRASOUND IMAGING | Age: 39
DRG: 871 | End: 2024-06-17
Payer: COMMERCIAL

## 2024-06-17 ENCOUNTER — APPOINTMENT (OUTPATIENT)
Dept: GENERAL RADIOLOGY | Age: 39
DRG: 871 | End: 2024-06-17
Payer: COMMERCIAL

## 2024-06-17 LAB
APPEARANCE FLUID: NORMAL
BACTERIA BLD CULT ORG #2: NORMAL
BACTERIA BLD CULT: NORMAL
BDY FLUID QUALITY: NORMAL
CELL COUNT FLUID TYPE: NORMAL
COLOR FLUID: YELLOW
EOSINOPHIL NFR FLD MANUAL: 2 %
INR PPP: 1.16 (ref 0.85–1.15)
LYMPHOCYTES NFR FLD: 18 %
MACROPHAGES # FLD: 2 %
NEUTROPHIL, FLUID: 78 %
NUC CELL # FLD: NORMAL /CUMM
PROTHROMBIN TIME: 15 SEC (ref 11.9–14.9)
RBC FLUID: NORMAL /CUMM
TOTAL CELLS COUNTED FLD: 100

## 2024-06-17 PROCEDURE — 6360000002 HC RX W HCPCS: Performed by: NURSE PRACTITIONER

## 2024-06-17 PROCEDURE — 82945 GLUCOSE OTHER FLUID: CPT

## 2024-06-17 PROCEDURE — 2580000003 HC RX 258: Performed by: NURSE PRACTITIONER

## 2024-06-17 PROCEDURE — 99233 SBSQ HOSP IP/OBS HIGH 50: CPT | Performed by: INTERNAL MEDICINE

## 2024-06-17 PROCEDURE — 0W993ZZ DRAINAGE OF RIGHT PLEURAL CAVITY, PERCUTANEOUS APPROACH: ICD-10-PCS | Performed by: STUDENT IN AN ORGANIZED HEALTH CARE EDUCATION/TRAINING PROGRAM

## 2024-06-17 PROCEDURE — 87102 FUNGUS ISOLATION CULTURE: CPT

## 2024-06-17 PROCEDURE — 36415 COLL VENOUS BLD VENIPUNCTURE: CPT

## 2024-06-17 PROCEDURE — 6370000000 HC RX 637 (ALT 250 FOR IP)

## 2024-06-17 PROCEDURE — 32555 ASPIRATE PLEURA W/ IMAGING: CPT

## 2024-06-17 PROCEDURE — 84157 ASSAY OF PROTEIN OTHER: CPT

## 2024-06-17 PROCEDURE — 1200000000 HC SEMI PRIVATE

## 2024-06-17 PROCEDURE — 89051 BODY FLUID CELL COUNT: CPT

## 2024-06-17 PROCEDURE — 88112 CYTOPATH CELL ENHANCE TECH: CPT

## 2024-06-17 PROCEDURE — 83986 ASSAY PH BODY FLUID NOS: CPT

## 2024-06-17 PROCEDURE — 6370000000 HC RX 637 (ALT 250 FOR IP): Performed by: INTERNAL MEDICINE

## 2024-06-17 PROCEDURE — 94640 AIRWAY INHALATION TREATMENT: CPT

## 2024-06-17 PROCEDURE — 85610 PROTHROMBIN TIME: CPT

## 2024-06-17 PROCEDURE — 6360000002 HC RX W HCPCS: Performed by: INTERNAL MEDICINE

## 2024-06-17 PROCEDURE — 94761 N-INVAS EAR/PLS OXIMETRY MLT: CPT

## 2024-06-17 PROCEDURE — 87070 CULTURE OTHR SPECIMN AEROBIC: CPT

## 2024-06-17 PROCEDURE — 2580000003 HC RX 258: Performed by: INTERNAL MEDICINE

## 2024-06-17 PROCEDURE — 71045 X-RAY EXAM CHEST 1 VIEW: CPT

## 2024-06-17 PROCEDURE — 99232 SBSQ HOSP IP/OBS MODERATE 35: CPT | Performed by: INTERNAL MEDICINE

## 2024-06-17 PROCEDURE — 88305 TISSUE EXAM BY PATHOLOGIST: CPT

## 2024-06-17 PROCEDURE — 83615 LACTATE (LD) (LDH) ENZYME: CPT

## 2024-06-17 PROCEDURE — 87205 SMEAR GRAM STAIN: CPT

## 2024-06-17 PROCEDURE — 94660 CPAP INITIATION&MGMT: CPT

## 2024-06-17 RX ORDER — LACTOBACILLUS RHAMNOSUS GG 10B CELL
1 CAPSULE ORAL
Status: DISCONTINUED | OUTPATIENT
Start: 2024-06-17 | End: 2024-06-20 | Stop reason: HOSPADM

## 2024-06-17 RX ADMIN — ENOXAPARIN SODIUM 40 MG: 100 INJECTION SUBCUTANEOUS at 20:54

## 2024-06-17 RX ADMIN — BUDESONIDE INHALATION 500 MCG: 0.5 SUSPENSION RESPIRATORY (INHALATION) at 18:59

## 2024-06-17 RX ADMIN — SODIUM CHLORIDE, PRESERVATIVE FREE 10 ML: 5 INJECTION INTRAVENOUS at 08:37

## 2024-06-17 RX ADMIN — CEFEPIME 2000 MG: 2 INJECTION, POWDER, FOR SOLUTION INTRAVENOUS at 06:28

## 2024-06-17 RX ADMIN — IPRATROPIUM BROMIDE AND ALBUTEROL SULFATE 1 DOSE: 2.5; .5 SOLUTION RESPIRATORY (INHALATION) at 07:12

## 2024-06-17 RX ADMIN — SODIUM CHLORIDE, PRESERVATIVE FREE 10 ML: 5 INJECTION INTRAVENOUS at 20:54

## 2024-06-17 RX ADMIN — Medication 1 CAPSULE: at 13:33

## 2024-06-17 RX ADMIN — PREDNISONE 40 MG: 20 TABLET ORAL at 08:37

## 2024-06-17 RX ADMIN — IPRATROPIUM BROMIDE AND ALBUTEROL SULFATE 1 DOSE: 2.5; .5 SOLUTION RESPIRATORY (INHALATION) at 18:58

## 2024-06-17 RX ADMIN — BUDESONIDE INHALATION 500 MCG: 0.5 SUSPENSION RESPIRATORY (INHALATION) at 07:12

## 2024-06-17 RX ADMIN — CEFEPIME 2000 MG: 2 INJECTION, POWDER, FOR SOLUTION INTRAVENOUS at 14:26

## 2024-06-17 RX ADMIN — SODIUM CHLORIDE 100 MG: 9 INJECTION, SOLUTION INTRAVENOUS at 13:32

## 2024-06-17 RX ADMIN — LOSARTAN POTASSIUM 100 MG: 100 TABLET, FILM COATED ORAL at 08:37

## 2024-06-17 RX ADMIN — CEFEPIME 2000 MG: 2 INJECTION, POWDER, FOR SOLUTION INTRAVENOUS at 20:54

## 2024-06-17 ASSESSMENT — PAIN SCALES - GENERAL
PAINLEVEL_OUTOF10: 0
PAINLEVEL_OUTOF10: 0

## 2024-06-17 NOTE — OR NURSING
Image guided Thoracentesis completed.   200 mL of Gray/pus cloudy colored fluid withdrawn.  Pt tolerated procedure without any signs or symptoms of distress. Vital signs stable.         SPECIMEN SENT:  Yes    Vital Signs  Vitals:    06/17/24 1559   BP: (!) 147/70   Pulse: 84   Resp: 16   Temp:    SpO2:     (vital signs in table format)

## 2024-06-17 NOTE — FLOWSHEET NOTE
06/16/24 2015   Vital Signs   Temp 97.7 °F (36.5 °C)   Temp Source Oral   Pulse 94   Heart Rate Source Monitor   Respirations 18   /73   MAP (Calculated) 93   MAP (mmHg) 93   BP Location Left upper arm   BP Method Automatic   Patient Position Semi fowlers   Pain Assessment   Pain Assessment None - Denies Pain   Pain Level 0   Care Plan - Pain Goals   Verbalizes/displays adequate comfort level or baseline comfort level Encourage patient to monitor pain and request assistance   Opioid-Induced Sedation   POSS Score 1   RASS   Thakur Agitation Sedation Scale (RASS) 0   Oxygen Therapy   SpO2 95 %   Pulse Oximetry Type Continuous   O2 Device None (Room air)   O2 Flow Rate (L/min) 0 L/min     Shift assessment complete, see flow sheet, schedule medications given, see MAR. IV infusing without difficulty at this time. Pt VSS.patient alert, oriented x 4. On room air,pwww,NPO at midnight .   Bed in lowest position, Call light and bed side table within reach, pt educated on use of call light if needing assist., pt verbalized understanding, denies further questions at this time. Denies any needs at this time, continue to monitor.  Bedside Mobility Assessment Tool (BMAT):     Assessment Level 1- Sit and Shake    1. From a semi-reclined position, ask patient to sit up and rotate to a seated position at the side of the bed. Can use the bedrail.    2. Ask patient to reach out and grab your hand and shake making sure patient reaches across his/her midline.   Pass- Patient is able to come to a seated position, maintain core strength. Maintains seated balance while reaching across midline. Move on to Assessment Level 2.     Assessment Level 2- Stretch and Point   1. With patient in seated position at the side of the bed, have patient place both feet on the floor (or stool) with knees no higher than hips.    2. Ask patient to stretch one leg and straighten the knee, then bend the ankle/flex and point the toes. If appropriate,

## 2024-06-17 NOTE — OR NURSING
Pt arrived for image guided Thoracentesis. Dr. Moreno explained the procedure including the risk and benefits of the procedure. All questions answered. Pt verbalizes understanding of the procedure and states no more questions. Consent confirmed. Vital signs stable. Labs, allergies, medications, and code status reviewed. No contraindications noted. Time out completed prior to procedure.    Vital Signs  Vitals:    06/17/24 1559   BP: (!) 147/70   Pulse: 84   Resp: 16   Temp:    SpO2:     (vital signs in table format)      Allergies  Patient has no known allergies. (allergies)    Labs  Lab Results   Component Value Date    INR 1.16 (H) 06/17/2024    PROTIME 15.0 (H) 06/17/2024     Lab Results   Component Value Date    CREATININE 0.8 (L) 06/16/2024    BUN 13 06/16/2024     06/16/2024    K 3.7 06/16/2024     06/16/2024    CO2 25 06/16/2024     Lab Results   Component Value Date    WBC 7.2 06/16/2024    HGB 9.9 (L) 06/16/2024    HCT 28.6 (L) 06/16/2024    MCV 89.7 06/16/2024     06/16/2024

## 2024-06-18 ENCOUNTER — APPOINTMENT (OUTPATIENT)
Dept: CT IMAGING | Age: 39
DRG: 871 | End: 2024-06-18
Payer: COMMERCIAL

## 2024-06-18 PROBLEM — J86.9 EMPYEMA (HCC): Status: ACTIVE | Noted: 2024-06-18

## 2024-06-18 LAB
ANION GAP SERPL CALCULATED.3IONS-SCNC: 11 MMOL/L (ref 3–16)
BUN SERPL-MCNC: 13 MG/DL (ref 7–20)
CALCIUM SERPL-MCNC: 8.7 MG/DL (ref 8.3–10.6)
CHLORIDE SERPL-SCNC: 101 MMOL/L (ref 99–110)
CO2 SERPL-SCNC: 25 MMOL/L (ref 21–32)
CREAT SERPL-MCNC: 0.9 MG/DL (ref 0.9–1.3)
GFR SERPLBLD CREATININE-BSD FMLA CKD-EPI: >90 ML/MIN/{1.73_M2}
GLUCOSE FLD-MCNC: <10 MG/DL
GLUCOSE SERPL-MCNC: 96 MG/DL (ref 70–99)
LDH FLD L TO P-CCNC: >2500 U/L
LOEFFLER MB STN SPEC: NORMAL
PH FLD STRIP: 7 [PH]
POTASSIUM SERPL-SCNC: 3.4 MMOL/L (ref 3.5–5.1)
PROT FLD-MCNC: 3.4 G/DL
SODIUM SERPL-SCNC: 137 MMOL/L (ref 136–145)
SPECIMEN SOURCE FLD: NORMAL

## 2024-06-18 PROCEDURE — 2580000003 HC RX 258: Performed by: INTERNAL MEDICINE

## 2024-06-18 PROCEDURE — 36415 COLL VENOUS BLD VENIPUNCTURE: CPT

## 2024-06-18 PROCEDURE — 2580000003 HC RX 258: Performed by: NURSE PRACTITIONER

## 2024-06-18 PROCEDURE — 99233 SBSQ HOSP IP/OBS HIGH 50: CPT | Performed by: INTERNAL MEDICINE

## 2024-06-18 PROCEDURE — 71250 CT THORAX DX C-: CPT

## 2024-06-18 PROCEDURE — 6370000000 HC RX 637 (ALT 250 FOR IP): Performed by: NURSE PRACTITIONER

## 2024-06-18 PROCEDURE — 94761 N-INVAS EAR/PLS OXIMETRY MLT: CPT

## 2024-06-18 PROCEDURE — 2060000000 HC ICU INTERMEDIATE R&B

## 2024-06-18 PROCEDURE — C1769 GUIDE WIRE: HCPCS

## 2024-06-18 PROCEDURE — 80048 BASIC METABOLIC PNL TOTAL CA: CPT

## 2024-06-18 PROCEDURE — 94660 CPAP INITIATION&MGMT: CPT

## 2024-06-18 PROCEDURE — 6360000002 HC RX W HCPCS: Performed by: INTERNAL MEDICINE

## 2024-06-18 PROCEDURE — 6360000002 HC RX W HCPCS: Performed by: NURSE PRACTITIONER

## 2024-06-18 PROCEDURE — 0W9930Z DRAINAGE OF RIGHT PLEURAL CAVITY WITH DRAINAGE DEVICE, PERCUTANEOUS APPROACH: ICD-10-PCS | Performed by: STUDENT IN AN ORGANIZED HEALTH CARE EDUCATION/TRAINING PROGRAM

## 2024-06-18 PROCEDURE — 94640 AIRWAY INHALATION TREATMENT: CPT

## 2024-06-18 PROCEDURE — 6370000000 HC RX 637 (ALT 250 FOR IP): Performed by: INTERNAL MEDICINE

## 2024-06-18 PROCEDURE — 6370000000 HC RX 637 (ALT 250 FOR IP)

## 2024-06-18 PROCEDURE — 2700000000 HC OXYGEN THERAPY PER DAY

## 2024-06-18 RX ORDER — MORPHINE SULFATE 4 MG/ML
4 INJECTION, SOLUTION INTRAMUSCULAR; INTRAVENOUS EVERY 4 HOURS PRN
Status: DISCONTINUED | OUTPATIENT
Start: 2024-06-18 | End: 2024-06-19

## 2024-06-18 RX ORDER — POTASSIUM CHLORIDE 20 MEQ/1
20 TABLET, EXTENDED RELEASE ORAL ONCE
Status: COMPLETED | OUTPATIENT
Start: 2024-06-18 | End: 2024-06-18

## 2024-06-18 RX ADMIN — SODIUM CHLORIDE, PRESERVATIVE FREE 10 ML: 5 INJECTION INTRAVENOUS at 08:23

## 2024-06-18 RX ADMIN — POTASSIUM CHLORIDE 20 MEQ: 1500 TABLET, EXTENDED RELEASE ORAL at 11:33

## 2024-06-18 RX ADMIN — IPRATROPIUM BROMIDE AND ALBUTEROL SULFATE 1 DOSE: 2.5; .5 SOLUTION RESPIRATORY (INHALATION) at 18:49

## 2024-06-18 RX ADMIN — Medication 1 CAPSULE: at 08:23

## 2024-06-18 RX ADMIN — MORPHINE SULFATE 4 MG: 4 INJECTION, SOLUTION INTRAMUSCULAR; INTRAVENOUS at 21:22

## 2024-06-18 RX ADMIN — LOSARTAN POTASSIUM 100 MG: 100 TABLET, FILM COATED ORAL at 08:23

## 2024-06-18 RX ADMIN — CEFEPIME 2000 MG: 2 INJECTION, POWDER, FOR SOLUTION INTRAVENOUS at 15:10

## 2024-06-18 RX ADMIN — ENOXAPARIN SODIUM 40 MG: 100 INJECTION SUBCUTANEOUS at 21:22

## 2024-06-18 RX ADMIN — BUDESONIDE INHALATION 500 MCG: 0.5 SUSPENSION RESPIRATORY (INHALATION) at 07:40

## 2024-06-18 RX ADMIN — BUDESONIDE INHALATION 500 MCG: 0.5 SUSPENSION RESPIRATORY (INHALATION) at 18:49

## 2024-06-18 RX ADMIN — CEFEPIME 2000 MG: 2 INJECTION, POWDER, FOR SOLUTION INTRAVENOUS at 23:25

## 2024-06-18 RX ADMIN — PREDNISONE 40 MG: 20 TABLET ORAL at 08:23

## 2024-06-18 RX ADMIN — VANCOMYCIN HYDROCHLORIDE 1750 MG: 10 INJECTION, POWDER, LYOPHILIZED, FOR SOLUTION INTRAVENOUS at 09:42

## 2024-06-18 RX ADMIN — ENOXAPARIN SODIUM 40 MG: 100 INJECTION SUBCUTANEOUS at 08:23

## 2024-06-18 RX ADMIN — CEFEPIME 2000 MG: 2 INJECTION, POWDER, FOR SOLUTION INTRAVENOUS at 05:52

## 2024-06-18 RX ADMIN — SODIUM CHLORIDE 100 MG: 9 INJECTION, SOLUTION INTRAVENOUS at 13:22

## 2024-06-18 RX ADMIN — IPRATROPIUM BROMIDE AND ALBUTEROL SULFATE 1 DOSE: 2.5; .5 SOLUTION RESPIRATORY (INHALATION) at 07:39

## 2024-06-18 RX ADMIN — VANCOMYCIN HYDROCHLORIDE 1750 MG: 10 INJECTION, POWDER, LYOPHILIZED, FOR SOLUTION INTRAVENOUS at 17:33

## 2024-06-18 RX ADMIN — ACETAMINOPHEN 650 MG: 325 TABLET ORAL at 19:28

## 2024-06-18 ASSESSMENT — PAIN DESCRIPTION - LOCATION
LOCATION: CHEST;OTHER (COMMENT)
LOCATION: CHEST

## 2024-06-18 ASSESSMENT — PAIN SCALES - GENERAL
PAINLEVEL_OUTOF10: 7
PAINLEVEL_OUTOF10: 5
PAINLEVEL_OUTOF10: 8
PAINLEVEL_OUTOF10: 7
PAINLEVEL_OUTOF10: 0
PAINLEVEL_OUTOF10: 0
PAINLEVEL_OUTOF10: 3

## 2024-06-18 ASSESSMENT — PAIN DESCRIPTION - DESCRIPTORS
DESCRIPTORS: STABBING
DESCRIPTORS: ACHING

## 2024-06-18 ASSESSMENT — PAIN DESCRIPTION - ONSET: ONSET: ON-GOING

## 2024-06-18 ASSESSMENT — PAIN DESCRIPTION - ORIENTATION
ORIENTATION: RIGHT
ORIENTATION: RIGHT

## 2024-06-18 ASSESSMENT — PAIN - FUNCTIONAL ASSESSMENT
PAIN_FUNCTIONAL_ASSESSMENT: ACTIVITIES ARE NOT PREVENTED
PAIN_FUNCTIONAL_ASSESSMENT: ACTIVITIES ARE NOT PREVENTED

## 2024-06-18 ASSESSMENT — PAIN DESCRIPTION - FREQUENCY: FREQUENCY: CONTINUOUS

## 2024-06-18 ASSESSMENT — PAIN DESCRIPTION - PAIN TYPE: TYPE: ACUTE PAIN

## 2024-06-18 NOTE — OR NURSING
Image guided chest tube insertion right completed. Dr. Moreno placed 10 Icelandic 25 cm Argon Skater drain LOT # [id number] 12/16/28 in the right pleural space. Drain/tube secured at the 20 cm line with Sutures. 12 milliliters of tan colored withdrawn immediately.  Drain/tube dressing clean, dry, and intact. Pt tolerated procedure without any signs or symptoms of distress. Vital signs stable. Report called to [address] [place] RN. Pt transported back to [address] [place] [address] [id number] in stable condition via bed by transport.         Vital Signs  Vitals:    06/18/24 1422   BP: (!) 142/77   Pulse: 78   Resp: 24   Temp:    SpO2: 95%    (vital signs in table format)    Post León  2 - Able to move 4 extremities voluntarily on command  2 - BP+/- 20mmHg of normal  2 - Fully awake  2 - Able to maintain oxygen saturation >92% on room air  2 - Able to breathe deeply and cough freely

## 2024-06-18 NOTE — OR NURSING
Pt arrived for image guided chest tube insertion right . Procedure explained including the risk and benefits of the procedure. All questions answered. Pt verbalizes understanding of the procedure and states no more questions. Consent confirmed. Vital signs stable. Labs, allergies, medications, and code status reviewed. No contraindications noted.     Vital Signs  Vitals:    06/18/24 1232   BP: 127/77   Pulse: 73   Resp: 16   Temp: 97.7 °F (36.5 °C)   SpO2: 96%    (vital signs in table format)    Pre León Score  2 - Able to move 4 extremities voluntarily on command  2 - BP+/- 20mmHg of normal  2 - Fully awake  2 - Able to maintain oxygen saturation >92% on room air  2 - Able to breathe deeply and cough freely    Allergies  Patient has no known allergies. (allergies)    Labs  Lab Results   Component Value Date    INR 1.16 (H) 06/17/2024    PROTIME 15.0 (H) 06/17/2024     Lab Results   Component Value Date    CREATININE 0.9 06/18/2024    BUN 13 06/18/2024     06/18/2024    K 3.4 (L) 06/18/2024     06/18/2024    CO2 25 06/18/2024     Lab Results   Component Value Date    WBC 7.2 06/16/2024    HGB 9.9 (L) 06/16/2024    HCT 28.6 (L) 06/16/2024    MCV 89.7 06/16/2024     06/16/2024

## 2024-06-18 NOTE — BRIEF OP NOTE
Brief Postoperative Note    Patient: Ángel Stauffer  YOB: 1985  MRN: [id number]      Pre-operative Diagnosis: Right posterior empyema    Post-operative Diagnosis: Same    Procedure: CT guided chest tube placement    Anesthesia: Local    Surgeons/Assistants: Jose Moreno DO    Estimated Blood Loss: less than 50     Complications: None    Infection Present At Time Of Surgery (PATOS) (choose all levels that have infection present):  - Deep Infection (muscle/fascia) present as evidenced by purulent fluid    Specimens: Was Not Obtained    Findings:   Successful CT guided 10-F locking pigtail chest tube placement with immediate return of tan purulent fluid.       Jose Moreno DO  6/18/2024, 2:43 PM  Interventional Radiology  [phone])

## 2024-06-18 NOTE — FLOWSHEET NOTE
06/17/24 2047   Vital Signs   Temp 98.1 °F (36.7 °C)   Temp Source Oral   Pulse 79   Heart Rate Source Monitor   Respirations 16   /64   MAP (Calculated) 78   BP Location Left upper arm   BP Method Automatic   Patient Position Semi mauricewlers   Pain Assessment   Pain Assessment None - Denies Pain   Oxygen Therapy   SpO2 97 %   O2 Device None (Room air)     Pt A/O assessment completed. Meds given per MAR. Pt denies any needs at this time. Call light within reach

## 2024-06-18 NOTE — CONSULTS
Pharmacy Note  Vancomycin Consult    Ángel Stauffer is a 39 y.o. male started on Vancomycin for CAP; consult received from Dr. Hills to manage therapy. Also receiving the following antibiotics: cefepime.    Allergies:  Patient has no known allergies.     Recent Labs     06/16/24  0051 06/16/24  0948   CREATININE 0.8* 0.8*       Recent Labs     06/16/24  0618   WBC 7.2       Estimated Creatinine Clearance: 203 mL/min (A) (based on SCr of 0.8 mg/dL (L)).      Intake/Output Summary (Last 24 hours) at 6/18/2024 0917  Last data filed at 6/18/2024 0912  Gross per 24 hour   Intake 240 ml   Output --   Net 240 ml       Wt Readings from Last 1 Encounters:   06/16/24 (!) 170.2 kg (375 lb 4.8 oz)         Body mass index is 49.51 kg/m².    Loading dose (critically ill or in ICU, require dialysis or renal replacement therapy): Vancomycin 25 mg/kg IVPB x 1 (maximum 2500 mg).  Maintenance dose: 10-20 mg/kg (maximum: 2000 mg/dose and 4500 mg/day) starting at the next dosing interval determined by renal function  Pulse dose: fluctuating renal function, DORI, ESRD  CRRT: 7.5-10 mg/kg q12h   Goal Vancomycin trough: 15-20 mcg/mL   Goal Vancomycin AUC: 400-600     Assessment/Plan:  Will initiate Vancomycin  1750 mg IV every 8 hours. Calculated Vancomycin AUC = 549 mg/L*h with an estimated steady-state vancomycin trough = 11 mcg/mL. Vancomycin level ordered for 6/19 0900. Timing of trough level will be determined based on culture results, renal function, and clinical response.     Thank you for the consult.    
Pharmacy to dose vancomycin for HAP, consult received from Dr. Vargas    Will give 2500mg dose x1    If vancomycin is to be continued upon admission, please reorder the pharmacy to dose consult.    
[facility]   Pharmacy Pharmacokinetic Monitoring Service - Vancomycin     Ángel Stauffer is a 39 y.o. male starting on vancomycin therapy for CAP x 5 days. Pharmacy consulted by Nhung Mena CNP, for monitoring and adjustment.    Target Concentration: Goal AUC/JUAN MANUEL 400-600 mg*hr/L    Additional Antimicrobials: Cefepime    Pertinent Laboratory Values:   Wt Readings from Last 1 Encounters:   06/13/24 (!) 168 kg (370 lb 6.4 oz)     Temp Readings from Last 1 Encounters:   06/13/24 99.5 °F (37.5 °C) (Oral)     Estimated Creatinine Clearance: 161 mL/min (based on SCr of 1 mg/dL).  Recent Labs     06/13/24  0526   CREATININE 1.0   BUN 10   WBC 12.9*     Procalcitonin: 0.3    Pertinent Cultures:  Culture Date Source Results        MRSA Nasal Swab: was ordered by provider, awaiting results.    Plan:  Dosing recommendations based on Bayesian software  Start vancomycin 2500 mg x 1, then 1500 mg every 12 hours.  Anticipated AUC of 502 and trough concentration of 12.5 at steady state  Renal labs as indicated   Vancomycin concentration ordered for 6/14 @ 0900.   Pharmacy will continue to monitor patient and adjust therapy as indicated    Thank you for the consult,  Chris Jesus RPH  6/13/2024 11:41 AM    
ondansetron **OR** ondansetron, polyethylene glycol, acetaminophen **OR** acetaminophen, perflutren lipid microspheres, ketorolac    ALLERGIES:  Patient has No Known Allergies.    REVIEW OF SYSTEMS:  Constitutional: at home  fever  HENT: Negative for sore throat  Eyes: Negative for redness   Respiratory: dyspnea, cough  Cardiovascular: Negative for chest pain  Gastrointestinal: Negative for vomiting, diarrhea   Genitourinary: Negative for hematuria   Musculoskeletal: Negative for arthralgias   Skin: Negative for rash  Neurological: Negative for syncope  Hematological: Negative for adenopathy  Psychiatric/Behavorial: Negative for anxiety    PHYSICAL EXAM:  Vitals:    06/13/24 1630   BP: 132/74   Pulse: (!) 101   Resp: 18   Temp: 99.3 °F (37.4 °C)   SpO2: 93%     Gen: No distress.   Eyes: PERRL. No sclera icterus. No conjunctival injection.   ENT: No discharge. Pharynx clear.   Neck: Trachea midline. No obvious mass.    Resp: rhonchi bilateral   CV: Regular rate. Regular rhythm. No murmur or rub. No edema. Peripheral pulses are 2+.  Capillary refill is less than 3 seconds.  GI: Non-tender. Non-distended. No hernia.   Skin: Warm and dry. No nodule on exposed extremities.   Lymph: No cervical LAD. No supraclavicular LAD.   M/S: No cyanosis. No joint deformity. No clubbing.   Neuro: Awake. Alert. Moves all four extremities.   Psych: Oriented x 3. No anxiety.     LABS:  CBC:   Recent Labs     06/13/24  0526   WBC 12.9*   HGB 12.5*   HCT 37.7*   MCV 91.5        BMP:   Recent Labs     06/13/24  0526 06/13/24  1650   * 136   K 3.9 3.9   CL 93* 98*   CO2 24 26   BUN 10 11   CREATININE 1.0 1.0     LIVER PROFILE:     Microbiology:  pending    Imaging:  Chest imaging was reviewed by me and showed   CT some infiltrate with small effusion right    ASSESSMENT:   Possible pneumonia  Possible irritant induced cough and bronchospasm   CHIDI  Small pleural effusion      PLAN:  *-work up for pneumonia   *-strep

## 2024-06-19 LAB
ANION GAP SERPL CALCULATED.3IONS-SCNC: 11 MMOL/L (ref 3–16)
ANISOCYTOSIS BLD QL SMEAR: ABNORMAL
BASOPHILS # BLD: 0 K/UL (ref 0–0.2)
BASOPHILS NFR BLD: 0 %
BUN SERPL-MCNC: 15 MG/DL (ref 7–20)
CALCIUM SERPL-MCNC: 8.8 MG/DL (ref 8.3–10.6)
CHLORIDE SERPL-SCNC: 103 MMOL/L (ref 99–110)
CO2 SERPL-SCNC: 24 MMOL/L (ref 21–32)
CREAT SERPL-MCNC: 0.8 MG/DL (ref 0.9–1.3)
DEPRECATED RDW RBC AUTO: 13.4 % (ref 12.4–15.4)
EOSINOPHIL # BLD: 0 K/UL (ref 0–0.6)
EOSINOPHIL NFR BLD: 0 %
GFR SERPLBLD CREATININE-BSD FMLA CKD-EPI: >90 ML/MIN/{1.73_M2}
GLUCOSE SERPL-MCNC: 100 MG/DL (ref 70–99)
HCT VFR BLD AUTO: 32.8 % (ref 40.5–52.5)
HGB BLD-MCNC: 10.9 G/DL (ref 13.5–17.5)
LYMPHOCYTES # BLD: 1.8 K/UL (ref 1–5.1)
LYMPHOCYTES NFR BLD: 21 %
MCH RBC QN AUTO: 30.3 PG (ref 26–34)
MCHC RBC AUTO-ENTMCNC: 33.3 G/DL (ref 31–36)
MCV RBC AUTO: 91 FL (ref 80–100)
MONOCYTES # BLD: 0.4 K/UL (ref 0–1.3)
MONOCYTES NFR BLD: 5 %
MYELOCYTES NFR BLD MANUAL: 3 %
NEUTROPHILS # BLD: 6.1 K/UL (ref 1.7–7.7)
NEUTROPHILS NFR BLD: 67 %
NEUTS BAND NFR BLD MANUAL: 3 % (ref 0–7)
PLATELET # BLD AUTO: 303 K/UL (ref 135–450)
PLATELET BLD QL SMEAR: ADEQUATE
PMV BLD AUTO: 7.3 FL (ref 5–10.5)
POLYCHROMASIA BLD QL SMEAR: ABNORMAL
POTASSIUM SERPL-SCNC: 4 MMOL/L (ref 3.5–5.1)
RBC # BLD AUTO: 3.61 M/UL (ref 4.2–5.9)
SLIDE REVIEW: ABNORMAL
SODIUM SERPL-SCNC: 138 MMOL/L (ref 136–145)
VANCOMYCIN TROUGH SERPL-MCNC: 18.7 UG/ML (ref 10–20)
VARIANT LYMPHS NFR BLD MANUAL: 1 % (ref 0–6)
WBC # BLD AUTO: 8.4 K/UL (ref 4–11)

## 2024-06-19 PROCEDURE — 80048 BASIC METABOLIC PNL TOTAL CA: CPT

## 2024-06-19 PROCEDURE — 6370000000 HC RX 637 (ALT 250 FOR IP): Performed by: INTERNAL MEDICINE

## 2024-06-19 PROCEDURE — 6360000002 HC RX W HCPCS: Performed by: NURSE PRACTITIONER

## 2024-06-19 PROCEDURE — 94660 CPAP INITIATION&MGMT: CPT

## 2024-06-19 PROCEDURE — 2060000000 HC ICU INTERMEDIATE R&B

## 2024-06-19 PROCEDURE — 2580000003 HC RX 258: Performed by: INTERNAL MEDICINE

## 2024-06-19 PROCEDURE — 6360000002 HC RX W HCPCS: Performed by: INTERNAL MEDICINE

## 2024-06-19 PROCEDURE — 6360000002 HC RX W HCPCS

## 2024-06-19 PROCEDURE — 80202 ASSAY OF VANCOMYCIN: CPT

## 2024-06-19 PROCEDURE — 99232 SBSQ HOSP IP/OBS MODERATE 35: CPT

## 2024-06-19 PROCEDURE — 94761 N-INVAS EAR/PLS OXIMETRY MLT: CPT

## 2024-06-19 PROCEDURE — 6370000000 HC RX 637 (ALT 250 FOR IP)

## 2024-06-19 PROCEDURE — 2580000003 HC RX 258: Performed by: NURSE PRACTITIONER

## 2024-06-19 PROCEDURE — 94640 AIRWAY INHALATION TREATMENT: CPT

## 2024-06-19 PROCEDURE — 85025 COMPLETE CBC W/AUTO DIFF WBC: CPT

## 2024-06-19 PROCEDURE — 36415 COLL VENOUS BLD VENIPUNCTURE: CPT

## 2024-06-19 RX ORDER — MORPHINE SULFATE 2 MG/ML
2 INJECTION, SOLUTION INTRAMUSCULAR; INTRAVENOUS
Status: DISCONTINUED | OUTPATIENT
Start: 2024-06-19 | End: 2024-06-20 | Stop reason: HOSPADM

## 2024-06-19 RX ORDER — MORPHINE SULFATE 4 MG/ML
4 INJECTION, SOLUTION INTRAMUSCULAR; INTRAVENOUS
Status: DISCONTINUED | OUTPATIENT
Start: 2024-06-19 | End: 2024-06-20 | Stop reason: HOSPADM

## 2024-06-19 RX ADMIN — IPRATROPIUM BROMIDE AND ALBUTEROL SULFATE 1 DOSE: 2.5; .5 SOLUTION RESPIRATORY (INHALATION) at 08:14

## 2024-06-19 RX ADMIN — PREDNISONE 40 MG: 20 TABLET ORAL at 09:51

## 2024-06-19 RX ADMIN — Medication 1 CAPSULE: at 09:51

## 2024-06-19 RX ADMIN — ALTEPLASE 10 MG: 2.2 INJECTION, POWDER, LYOPHILIZED, FOR SOLUTION INTRAVENOUS at 08:30

## 2024-06-19 RX ADMIN — MORPHINE SULFATE 4 MG: 4 INJECTION, SOLUTION INTRAMUSCULAR; INTRAVENOUS at 17:01

## 2024-06-19 RX ADMIN — CEFEPIME 2000 MG: 2 INJECTION, POWDER, FOR SOLUTION INTRAVENOUS at 13:55

## 2024-06-19 RX ADMIN — ENOXAPARIN SODIUM 40 MG: 100 INJECTION SUBCUTANEOUS at 19:53

## 2024-06-19 RX ADMIN — ENOXAPARIN SODIUM 40 MG: 100 INJECTION SUBCUTANEOUS at 09:51

## 2024-06-19 RX ADMIN — SODIUM CHLORIDE 100 MG: 9 INJECTION, SOLUTION INTRAVENOUS at 13:20

## 2024-06-19 RX ADMIN — MORPHINE SULFATE 4 MG: 4 INJECTION, SOLUTION INTRAMUSCULAR; INTRAVENOUS at 09:51

## 2024-06-19 RX ADMIN — SODIUM CHLORIDE, PRESERVATIVE FREE 10 ML: 5 INJECTION INTRAVENOUS at 09:52

## 2024-06-19 RX ADMIN — VANCOMYCIN HYDROCHLORIDE 1750 MG: 10 INJECTION, POWDER, LYOPHILIZED, FOR SOLUTION INTRAVENOUS at 03:04

## 2024-06-19 RX ADMIN — WATER 5 MG: 1 INJECTION INTRAMUSCULAR; INTRAVENOUS; SUBCUTANEOUS at 08:30

## 2024-06-19 RX ADMIN — MORPHINE SULFATE 4 MG: 4 INJECTION, SOLUTION INTRAMUSCULAR; INTRAVENOUS at 19:51

## 2024-06-19 RX ADMIN — CEFEPIME 2000 MG: 2 INJECTION, POWDER, FOR SOLUTION INTRAVENOUS at 22:48

## 2024-06-19 RX ADMIN — BUDESONIDE INHALATION 500 MCG: 0.5 SUSPENSION RESPIRATORY (INHALATION) at 08:14

## 2024-06-19 RX ADMIN — VANCOMYCIN HYDROCHLORIDE 1750 MG: 10 INJECTION, POWDER, LYOPHILIZED, FOR SOLUTION INTRAVENOUS at 18:56

## 2024-06-19 RX ADMIN — MORPHINE SULFATE 2 MG: 2 INJECTION, SOLUTION INTRAMUSCULAR; INTRAVENOUS at 22:46

## 2024-06-19 RX ADMIN — WATER 5 MG: 1 INJECTION INTRAMUSCULAR; INTRAVENOUS; SUBCUTANEOUS at 17:03

## 2024-06-19 RX ADMIN — BUDESONIDE INHALATION 500 MCG: 0.5 SUSPENSION RESPIRATORY (INHALATION) at 20:01

## 2024-06-19 RX ADMIN — IPRATROPIUM BROMIDE AND ALBUTEROL SULFATE 1 DOSE: 2.5; .5 SOLUTION RESPIRATORY (INHALATION) at 20:01

## 2024-06-19 RX ADMIN — ALTEPLASE 10 MG: 2.2 INJECTION, POWDER, LYOPHILIZED, FOR SOLUTION INTRAVENOUS at 17:05

## 2024-06-19 RX ADMIN — CEFEPIME 2000 MG: 2 INJECTION, POWDER, FOR SOLUTION INTRAVENOUS at 05:24

## 2024-06-19 RX ADMIN — MORPHINE SULFATE 4 MG: 4 INJECTION, SOLUTION INTRAMUSCULAR; INTRAVENOUS at 05:30

## 2024-06-19 RX ADMIN — LOSARTAN POTASSIUM 100 MG: 100 TABLET, FILM COATED ORAL at 09:51

## 2024-06-19 RX ADMIN — VANCOMYCIN HYDROCHLORIDE 1750 MG: 10 INJECTION, POWDER, LYOPHILIZED, FOR SOLUTION INTRAVENOUS at 10:48

## 2024-06-19 ASSESSMENT — PAIN DESCRIPTION - FREQUENCY: FREQUENCY: CONTINUOUS

## 2024-06-19 ASSESSMENT — PAIN - FUNCTIONAL ASSESSMENT
PAIN_FUNCTIONAL_ASSESSMENT: ACTIVITIES ARE NOT PREVENTED
PAIN_FUNCTIONAL_ASSESSMENT: PREVENTS OR INTERFERES SOME ACTIVE ACTIVITIES AND ADLS
PAIN_FUNCTIONAL_ASSESSMENT: ACTIVITIES ARE NOT PREVENTED
PAIN_FUNCTIONAL_ASSESSMENT: PREVENTS OR INTERFERES SOME ACTIVE ACTIVITIES AND ADLS

## 2024-06-19 ASSESSMENT — PAIN DESCRIPTION - LOCATION
LOCATION: CHEST;BACK
LOCATION: BACK;CHEST
LOCATION: CHEST
LOCATION: CHEST
LOCATION: BACK

## 2024-06-19 ASSESSMENT — PAIN DESCRIPTION - ORIENTATION
ORIENTATION: RIGHT
ORIENTATION: RIGHT;MID
ORIENTATION: RIGHT
ORIENTATION: RIGHT

## 2024-06-19 ASSESSMENT — PAIN SCALES - GENERAL
PAINLEVEL_OUTOF10: 9
PAINLEVEL_OUTOF10: 8
PAINLEVEL_OUTOF10: 7
PAINLEVEL_OUTOF10: 0
PAINLEVEL_OUTOF10: 7
PAINLEVEL_OUTOF10: 5

## 2024-06-19 ASSESSMENT — PAIN DESCRIPTION - DESCRIPTORS
DESCRIPTORS: JABBING;SHARP
DESCRIPTORS: SHARP
DESCRIPTORS: ACHING;STABBING
DESCRIPTORS: STABBING
DESCRIPTORS: STABBING

## 2024-06-19 ASSESSMENT — PAIN DESCRIPTION - PAIN TYPE
TYPE: ACUTE PAIN
TYPE: ACUTE PAIN

## 2024-06-19 ASSESSMENT — PAIN DESCRIPTION - ONSET: ONSET: ON-GOING

## 2024-06-19 NOTE — PROCEDURES
Procedure   Dose 1   Instill tPA and Pulmozyme intra Pleural,right side ,no blood lose  Date 6/19 ,7 am   Consent verbal  Under sterile procedure field ,tPA and Pulmozyme were isnerted   Patient tolerated dose   Next dose today 6 pm

## 2024-06-20 ENCOUNTER — HOSPITAL ENCOUNTER (INPATIENT)
Age: 39
LOS: 8 days | Discharge: HOME | End: 2024-06-28
Attending: INTERNAL MEDICINE | Admitting: INTERNAL MEDICINE
Payer: COMMERCIAL

## 2024-06-20 VITALS
TEMPERATURE: 97.6 F | HEIGHT: 73 IN | WEIGHT: 315 LBS | DIASTOLIC BLOOD PRESSURE: 87 MMHG | OXYGEN SATURATION: 96 % | RESPIRATION RATE: 16 BRPM | BODY MASS INDEX: 41.75 KG/M2 | HEART RATE: 81 BPM | SYSTOLIC BLOOD PRESSURE: 154 MMHG

## 2024-06-20 DIAGNOSIS — J86.9 EMPYEMA (HCC): ICD-10-CM

## 2024-06-20 DIAGNOSIS — M79.601 RIGHT ARM PAIN: Primary | ICD-10-CM

## 2024-06-20 LAB
ANION GAP SERPL CALCULATED.3IONS-SCNC: 11 MMOL/L (ref 3–16)
BACTERIA FLD AEROBE CULT: NORMAL
BASOPHILS # BLD: 0 K/UL (ref 0–0.2)
BASOPHILS NFR BLD: 0 %
BUN SERPL-MCNC: 14 MG/DL (ref 7–20)
CALCIUM SERPL-MCNC: 9.1 MG/DL (ref 8.3–10.6)
CHLORIDE SERPL-SCNC: 100 MMOL/L (ref 99–110)
CO2 SERPL-SCNC: 23 MMOL/L (ref 21–32)
CREAT SERPL-MCNC: 0.7 MG/DL (ref 0.9–1.3)
DEPRECATED RDW RBC AUTO: 13.5 % (ref 12.4–15.4)
EOSINOPHIL # BLD: 0.1 K/UL (ref 0–0.6)
EOSINOPHIL NFR BLD: 1 %
GFR SERPLBLD CREATININE-BSD FMLA CKD-EPI: >90 ML/MIN/{1.73_M2}
GLUCOSE SERPL-MCNC: 82 MG/DL (ref 70–99)
GRAM STN SPEC: NORMAL
HCT VFR BLD AUTO: 35.9 % (ref 40.5–52.5)
HGB BLD-MCNC: 12.1 G/DL (ref 13.5–17.5)
LYMPHOCYTES # BLD: 1.5 K/UL (ref 1–5.1)
LYMPHOCYTES NFR BLD: 16 %
MCH RBC QN AUTO: 30.3 PG (ref 26–34)
MCHC RBC AUTO-ENTMCNC: 33.7 G/DL (ref 31–36)
MCV RBC AUTO: 90.2 FL (ref 80–100)
METAMYELOCYTES NFR BLD MANUAL: 1 %
MONOCYTES # BLD: 0.8 K/UL (ref 0–1.3)
MONOCYTES NFR BLD: 9 %
MYELOCYTES NFR BLD MANUAL: 1 %
NEUTROPHILS # BLD: 6.7 K/UL (ref 1.7–7.7)
NEUTROPHILS NFR BLD: 69 %
NEUTS BAND NFR BLD MANUAL: 3 % (ref 0–7)
PLATELET # BLD AUTO: 370 K/UL (ref 135–450)
PLATELET BLD QL SMEAR: ADEQUATE
PMV BLD AUTO: 7.2 FL (ref 5–10.5)
POLYCHROMASIA BLD QL SMEAR: ABNORMAL
POTASSIUM SERPL-SCNC: 4.2 MMOL/L (ref 3.5–5.1)
RBC # BLD AUTO: 3.98 M/UL (ref 4.2–5.9)
SLIDE REVIEW: ABNORMAL
SODIUM SERPL-SCNC: 134 MMOL/L (ref 136–145)
WBC # BLD AUTO: 9.1 K/UL (ref 4–11)

## 2024-06-20 PROCEDURE — 6370000000 HC RX 637 (ALT 250 FOR IP): Performed by: INTERNAL MEDICINE

## 2024-06-20 PROCEDURE — 2580000003 HC RX 258: Performed by: INTERNAL MEDICINE

## 2024-06-20 PROCEDURE — 2700000000 HC OXYGEN THERAPY PER DAY

## 2024-06-20 PROCEDURE — 94640 AIRWAY INHALATION TREATMENT: CPT

## 2024-06-20 PROCEDURE — 6360000002 HC RX W HCPCS

## 2024-06-20 PROCEDURE — 94761 N-INVAS EAR/PLS OXIMETRY MLT: CPT

## 2024-06-20 PROCEDURE — 2060000000 HC ICU INTERMEDIATE R&B

## 2024-06-20 PROCEDURE — 36415 COLL VENOUS BLD VENIPUNCTURE: CPT

## 2024-06-20 PROCEDURE — 6360000002 HC RX W HCPCS: Performed by: INTERNAL MEDICINE

## 2024-06-20 PROCEDURE — 85025 COMPLETE CBC W/AUTO DIFF WBC: CPT

## 2024-06-20 PROCEDURE — 2580000003 HC RX 258: Performed by: NURSE PRACTITIONER

## 2024-06-20 PROCEDURE — 94660 CPAP INITIATION&MGMT: CPT

## 2024-06-20 PROCEDURE — 6370000000 HC RX 637 (ALT 250 FOR IP)

## 2024-06-20 PROCEDURE — 6360000002 HC RX W HCPCS: Performed by: NURSE PRACTITIONER

## 2024-06-20 PROCEDURE — 80048 BASIC METABOLIC PNL TOTAL CA: CPT

## 2024-06-20 PROCEDURE — 99231 SBSQ HOSP IP/OBS SF/LOW 25: CPT

## 2024-06-20 RX ORDER — SODIUM CHLORIDE 9 MG/ML
INJECTION, SOLUTION INTRAVENOUS PRN
Status: DISCONTINUED | OUTPATIENT
Start: 2024-06-20 | End: 2024-06-24

## 2024-06-20 RX ORDER — POTASSIUM CHLORIDE 20 MEQ/1
40 TABLET, EXTENDED RELEASE ORAL PRN
Status: DISCONTINUED | OUTPATIENT
Start: 2024-06-20 | End: 2024-06-24

## 2024-06-20 RX ORDER — ACETAMINOPHEN 650 MG/1
650 SUPPOSITORY RECTAL EVERY 6 HOURS PRN
Status: DISCONTINUED | OUTPATIENT
Start: 2024-06-20 | End: 2024-06-24

## 2024-06-20 RX ORDER — POTASSIUM CHLORIDE 7.45 MG/ML
10 INJECTION INTRAVENOUS PRN
Status: DISCONTINUED | OUTPATIENT
Start: 2024-06-20 | End: 2024-06-24

## 2024-06-20 RX ORDER — IPRATROPIUM BROMIDE AND ALBUTEROL SULFATE 2.5; .5 MG/3ML; MG/3ML
1 SOLUTION RESPIRATORY (INHALATION) 2 TIMES DAILY
Status: DISCONTINUED | OUTPATIENT
Start: 2024-06-20 | End: 2024-06-24

## 2024-06-20 RX ORDER — ONDANSETRON 4 MG/1
4 TABLET, ORALLY DISINTEGRATING ORAL EVERY 8 HOURS PRN
Status: DISCONTINUED | OUTPATIENT
Start: 2024-06-20 | End: 2024-06-24

## 2024-06-20 RX ORDER — POLYETHYLENE GLYCOL 3350 17 G/17G
17 POWDER, FOR SOLUTION ORAL DAILY PRN
Status: DISCONTINUED | OUTPATIENT
Start: 2024-06-20 | End: 2024-06-24

## 2024-06-20 RX ORDER — LACTOBACILLUS RHAMNOSUS GG 10B CELL
1 CAPSULE ORAL
Status: DISCONTINUED | OUTPATIENT
Start: 2024-06-21 | End: 2024-06-24

## 2024-06-20 RX ORDER — ACETAMINOPHEN 650 MG/1
650 SUPPOSITORY RECTAL EVERY 6 HOURS PRN
Status: DISCONTINUED | OUTPATIENT
Start: 2024-06-20 | End: 2024-06-20 | Stop reason: SDUPTHER

## 2024-06-20 RX ORDER — ONDANSETRON 2 MG/ML
4 INJECTION INTRAMUSCULAR; INTRAVENOUS EVERY 6 HOURS PRN
Status: DISCONTINUED | OUTPATIENT
Start: 2024-06-20 | End: 2024-06-24

## 2024-06-20 RX ORDER — LOSARTAN POTASSIUM 100 MG/1
100 TABLET ORAL DAILY
Status: DISCONTINUED | OUTPATIENT
Start: 2024-06-21 | End: 2024-06-24

## 2024-06-20 RX ORDER — SODIUM CHLORIDE 0.9 % (FLUSH) 0.9 %
5-40 SYRINGE (ML) INJECTION PRN
Status: DISCONTINUED | OUTPATIENT
Start: 2024-06-20 | End: 2024-06-24

## 2024-06-20 RX ORDER — IPRATROPIUM BROMIDE AND ALBUTEROL SULFATE 2.5; .5 MG/3ML; MG/3ML
1 SOLUTION RESPIRATORY (INHALATION) EVERY 4 HOURS PRN
Status: DISCONTINUED | OUTPATIENT
Start: 2024-06-20 | End: 2024-06-24

## 2024-06-20 RX ORDER — ENOXAPARIN SODIUM 100 MG/ML
40 INJECTION SUBCUTANEOUS 2 TIMES DAILY
Status: DISCONTINUED | OUTPATIENT
Start: 2024-06-20 | End: 2024-06-24

## 2024-06-20 RX ORDER — SODIUM CHLORIDE 0.9 % (FLUSH) 0.9 %
5-40 SYRINGE (ML) INJECTION EVERY 12 HOURS SCHEDULED
Status: DISCONTINUED | OUTPATIENT
Start: 2024-06-20 | End: 2024-06-24

## 2024-06-20 RX ORDER — MORPHINE SULFATE 4 MG/ML
4 INJECTION, SOLUTION INTRAMUSCULAR; INTRAVENOUS
Status: DISCONTINUED | OUTPATIENT
Start: 2024-06-20 | End: 2024-06-24

## 2024-06-20 RX ORDER — MORPHINE SULFATE 2 MG/ML
2 INJECTION, SOLUTION INTRAMUSCULAR; INTRAVENOUS
Status: DISCONTINUED | OUTPATIENT
Start: 2024-06-20 | End: 2024-06-24

## 2024-06-20 RX ORDER — ACETAMINOPHEN 325 MG/1
650 TABLET ORAL EVERY 6 HOURS PRN
Status: DISCONTINUED | OUTPATIENT
Start: 2024-06-20 | End: 2024-06-20 | Stop reason: SDUPTHER

## 2024-06-20 RX ORDER — METHOCARBAMOL 750 MG/1
750 TABLET, FILM COATED ORAL 4 TIMES DAILY
Status: DISCONTINUED | OUTPATIENT
Start: 2024-06-20 | End: 2024-06-24

## 2024-06-20 RX ORDER — SENNOSIDES A AND B 8.6 MG/1
1 TABLET, FILM COATED ORAL DAILY PRN
Status: DISCONTINUED | OUTPATIENT
Start: 2024-06-20 | End: 2024-06-24

## 2024-06-20 RX ORDER — METHOCARBAMOL 500 MG/1
750 TABLET, FILM COATED ORAL 4 TIMES DAILY
Status: DISCONTINUED | OUTPATIENT
Start: 2024-06-20 | End: 2024-06-20 | Stop reason: HOSPADM

## 2024-06-20 RX ORDER — ACETAMINOPHEN 325 MG/1
650 TABLET ORAL EVERY 6 HOURS PRN
Status: DISCONTINUED | OUTPATIENT
Start: 2024-06-20 | End: 2024-06-24

## 2024-06-20 RX ORDER — MAGNESIUM SULFATE IN WATER 40 MG/ML
2000 INJECTION, SOLUTION INTRAVENOUS PRN
Status: DISCONTINUED | OUTPATIENT
Start: 2024-06-20 | End: 2024-06-24

## 2024-06-20 RX ORDER — BUDESONIDE 0.5 MG/2ML
0.5 INHALANT ORAL
Status: DISCONTINUED | OUTPATIENT
Start: 2024-06-20 | End: 2024-06-24

## 2024-06-20 RX ADMIN — ALTEPLASE 10 MG: 2.2 INJECTION, POWDER, LYOPHILIZED, FOR SOLUTION INTRAVENOUS at 08:20

## 2024-06-20 RX ADMIN — CEFEPIME 2000 MG: 2 INJECTION, POWDER, FOR SOLUTION INTRAVENOUS at 06:06

## 2024-06-20 RX ADMIN — BUDESONIDE INHALATION 500 MCG: 0.5 SUSPENSION RESPIRATORY (INHALATION) at 07:59

## 2024-06-20 RX ADMIN — MORPHINE SULFATE 4 MG: 4 INJECTION, SOLUTION INTRAMUSCULAR; INTRAVENOUS at 14:20

## 2024-06-20 RX ADMIN — METHOCARBAMOL 750 MG: 500 TABLET ORAL at 17:51

## 2024-06-20 RX ADMIN — VANCOMYCIN HYDROCHLORIDE 1750 MG: 10 INJECTION, POWDER, LYOPHILIZED, FOR SOLUTION INTRAVENOUS at 01:45

## 2024-06-20 RX ADMIN — SODIUM CHLORIDE, PRESERVATIVE FREE 10 ML: 5 INJECTION INTRAVENOUS at 21:25

## 2024-06-20 RX ADMIN — Medication 1 CAPSULE: at 08:28

## 2024-06-20 RX ADMIN — BUDESONIDE 500 MCG: 0.5 SUSPENSION RESPIRATORY (INHALATION) at 19:47

## 2024-06-20 RX ADMIN — WATER 5 MG: 1 INJECTION INTRAMUSCULAR; INTRAVENOUS; SUBCUTANEOUS at 08:21

## 2024-06-20 RX ADMIN — IPRATROPIUM BROMIDE AND ALBUTEROL SULFATE 1 DOSE: 2.5; .5 SOLUTION RESPIRATORY (INHALATION) at 07:59

## 2024-06-20 RX ADMIN — MORPHINE SULFATE 2 MG: 2 INJECTION, SOLUTION INTRAMUSCULAR; INTRAVENOUS at 21:37

## 2024-06-20 RX ADMIN — IPRATROPIUM BROMIDE AND ALBUTEROL SULFATE 1 DOSE: 2.5; .5 SOLUTION RESPIRATORY (INHALATION) at 19:37

## 2024-06-20 RX ADMIN — SODIUM CHLORIDE, PRESERVATIVE FREE 10 ML: 5 INJECTION INTRAVENOUS at 08:31

## 2024-06-20 RX ADMIN — METHOCARBAMOL TABLETS 750 MG: 500 TABLET, COATED ORAL at 10:22

## 2024-06-20 RX ADMIN — VANCOMYCIN HYDROCHLORIDE 1750 MG: 10 INJECTION, POWDER, LYOPHILIZED, FOR SOLUTION INTRAVENOUS at 10:23

## 2024-06-20 RX ADMIN — LOSARTAN POTASSIUM 100 MG: 100 TABLET, FILM COATED ORAL at 08:28

## 2024-06-20 RX ADMIN — SODIUM CHLORIDE, PRESERVATIVE FREE 10 ML: 5 INJECTION INTRAVENOUS at 21:26

## 2024-06-20 RX ADMIN — ENOXAPARIN SODIUM 40 MG: 100 INJECTION SUBCUTANEOUS at 21:24

## 2024-06-20 RX ADMIN — MORPHINE SULFATE 4 MG: 4 INJECTION, SOLUTION INTRAMUSCULAR; INTRAVENOUS at 08:28

## 2024-06-20 RX ADMIN — VANCOMYCIN HYDROCHLORIDE 1500 MG: 10 INJECTION, POWDER, LYOPHILIZED, FOR SOLUTION INTRAVENOUS at 23:51

## 2024-06-20 RX ADMIN — CEFEPIME 2000 MG: 2 INJECTION, POWDER, FOR SOLUTION INTRAVENOUS at 17:56

## 2024-06-20 RX ADMIN — ENOXAPARIN SODIUM 40 MG: 100 INJECTION SUBCUTANEOUS at 08:28

## 2024-06-20 RX ADMIN — METHOCARBAMOL 750 MG: 500 TABLET ORAL at 21:24

## 2024-06-20 RX ADMIN — MORPHINE SULFATE 2 MG: 2 INJECTION, SOLUTION INTRAMUSCULAR; INTRAVENOUS at 01:47

## 2024-06-20 ASSESSMENT — PAIN SCALES - GENERAL
PAINLEVEL_OUTOF10: 8
PAINLEVEL_OUTOF10: 4
PAINLEVEL_OUTOF10: 8
PAINLEVEL_OUTOF10: 10
PAINLEVEL_OUTOF10: 6
PAINLEVEL_OUTOF10: 4
PAINLEVEL_OUTOF10: 2

## 2024-06-20 ASSESSMENT — PAIN DESCRIPTION - LOCATION: LOCATION: BACK;CHEST

## 2024-06-20 ASSESSMENT — PAIN DESCRIPTION - DESCRIPTORS: DESCRIPTORS: STABBING

## 2024-06-20 ASSESSMENT — PAIN DESCRIPTION - ORIENTATION: ORIENTATION: RIGHT

## 2024-06-20 ASSESSMENT — PAIN - FUNCTIONAL ASSESSMENT: PAIN_FUNCTIONAL_ASSESSMENT: PREVENTS OR INTERFERES SOME ACTIVE ACTIVITIES AND ADLS

## 2024-06-20 NOTE — PLAN OF CARE
Problem: Discharge Planning  Goal: Discharge to home or other facility with appropriate resources  6/16/2024 0734 by Viktoriya Taylor RN  Outcome: Progressing  6/15/2024 2330 by Jyoti Rosado RN  Outcome: Progressing     Problem: Respiratory - Adult  Goal: Achieves optimal ventilation and oxygenation  6/16/2024 0734 by Viktoriya Taylor RN  Outcome: Progressing  6/15/2024 2330 by Jyoti Rosado RN  Outcome: Progressing     
  Problem: Discharge Planning  Goal: Discharge to home or other facility with appropriate resources  Outcome: Progressing     Problem: Respiratory - Adult  Goal: Achieves optimal ventilation and oxygenation  Outcome: Progressing     Problem: Skin/Tissue Integrity - Adult  Goal: Skin integrity remains intact  Outcome: Progressing     
  Problem: Discharge Planning  Goal: Discharge to home or other facility with appropriate resources  Outcome: Progressing     Problem: Respiratory - Adult  Goal: Achieves optimal ventilation and oxygenation  Outcome: Progressing     Problem: Skin/Tissue Integrity - Adult  Goal: Skin integrity remains intact  Outcome: Progressing     Problem: Pain  Goal: Verbalizes/displays adequate comfort level or baseline comfort level  Outcome: Progressing     
  Problem: Discharge Planning  Goal: Discharge to home or other facility with appropriate resources  Outcome: Progressing     Problem: Respiratory - Adult  Goal: Achieves optimal ventilation and oxygenation  Outcome: Progressing     Problem: Skin/Tissue Integrity - Adult  Goal: Skin integrity remains intact  Outcome: Progressing  Flowsheets (Taken 6/14/2024 0931 by Radha Gonzales RN)  Skin Integrity Remains Intact: Monitor for areas of redness and/or skin breakdown     Problem: Musculoskeletal - Adult  Goal: Return mobility to safest level of function  Outcome: Progressing     Problem: Infection - Adult  Goal: Absence of infection at discharge  Outcome: Progressing     
  Problem: Discharge Planning  Goal: Discharge to home or other facility with appropriate resources  Outcome: Progressing  Flowsheets (Taken 6/13/2024 1786)  Discharge to home or other facility with appropriate resources: Identify barriers to discharge with patient and caregiver     
  Problem: Discharge Planning  Goal: Discharge to home or other facility with appropriate resources  Outcome: Progressing  Flowsheets (Taken 6/16/2024 2036)  Discharge to home or other facility with appropriate resources: Identify barriers to discharge with patient and caregiver     Problem: Neurosensory - Adult  Goal: Achieves stable or improved neurological status  Outcome: Progressing     Problem: Cardiovascular - Adult  Goal: Maintains optimal cardiac output and hemodynamic stability  Outcome: Progressing  Flowsheets (Taken 6/16/2024 2036)  Maintains optimal cardiac output and hemodynamic stability:   Monitor blood pressure and heart rate   Monitor urine output and notify Licensed Independent Practitioner for values outside of normal range  Goal: Absence of cardiac dysrhythmias or at baseline  Recent Flowsheet Documentation  Taken 6/16/2024 2036 by Mariam Tristan RN  Absence of cardiac dysrhythmias or at baseline:   Monitor cardiac rate and rhythm   Assess for signs of decreased cardiac output     Problem: Cardiovascular - Adult  Goal: Absence of cardiac dysrhythmias or at baseline  Recent Flowsheet Documentation  Taken 6/16/2024 2036 by Mariam Tristan RN  Absence of cardiac dysrhythmias or at baseline:   Monitor cardiac rate and rhythm   Assess for signs of decreased cardiac output     Problem: Skin/Tissue Integrity - Adult  Goal: Skin integrity remains intact  Outcome: Progressing  Flowsheets (Taken 6/16/2024 2036)  Skin Integrity Remains Intact: Monitor for areas of redness and/or skin breakdown     Problem: Musculoskeletal - Adult  Goal: Return mobility to safest level of function  Outcome: Progressing     Problem: Infection - Adult  Goal: Absence of infection at discharge  Outcome: Progressing  Flowsheets (Taken 6/16/2024 2036)  Absence of infection at discharge: Assess and monitor for signs and symptoms of infection  Goal: Absence of infection during hospitalization  Recent Flowsheet 
  Problem: Respiratory - Adult  Goal: Achieves optimal ventilation and oxygenation  6/15/2024 0736 by Viktoriya Taylor RN  Outcome: Progressing  6/14/2024 2300 by Jyoti Rosado RN  Outcome: Progressing     Problem: Musculoskeletal - Adult  Goal: Return mobility to safest level of function  6/15/2024 0736 by Viktoriya Taylor RN  Outcome: Progressing  6/14/2024 2300 by Jyoti Rosado RN  Outcome: Progressing  Goal: Maintain proper alignment of affected body part  Outcome: Progressing  Goal: Return ADL status to a safe level of function  Outcome: Progressing     
  Problem: Respiratory - Adult  Goal: Achieves optimal ventilation and oxygenation  Outcome: Progressing     Problem: Infection - Adult  Goal: Absence of infection at discharge  Recent Flowsheet Documentation  Taken 6/19/2024 1938 by Adrianne Vela RN  Absence of infection at discharge: Assess and monitor for signs and symptoms of infection     
Admit to  with telemetry    Failed outpatient CAP treatment  Admitted outside hospital for CAP  He has been on Levaquin and Doxycyline  CTPA negative for PE- concern for right small parapneumonic effusion  Pulmonary consulted    Pt is morbidly obese, untreated sleep apnea   Added BNP  Check echo  Vancomycin and Cefepime         Nhung Mena, PATSY - CNP     
Patient provided a COPD Educational Folder that includes the following materials:     [x]  ExSafe Booklet: Managing your COPD  [x]  ALA: Getting the Most Out of Medication Delivery Devices  [x]  ALA: My COPD Action Plan  [x]  [employer] [facility]: [facility]   [x]  Smoking Cessation Classes  [x]  Outpatient Spiritual Care Services  [x]  Magnet: Signs of COPD    PATIENT/CAREGIVER TEACHING:   Level of patient/caregiver understanding able to:   [x] Verbalize understanding   [] Demonstrate understanding       [] Teach back        [x] Needs reinforcement     []  Other:     Electronically signed by Mariam Tristan RN on 6/17/2024 at 1:15 AM    
hospitalization  Outcome: Progressing     Problem: Metabolic/Fluid and Electrolytes - Adult  Goal: Electrolytes maintained within normal limits  Outcome: Progressing  Goal: Hemodynamic stability and optimal renal function maintained  Outcome: Progressing  Goal: Glucose maintained within prescribed range  Outcome: Progressing     Problem: Hematologic - Adult  Goal: Maintains hematologic stability  Outcome: Progressing     
Pain  Goal: Verbalizes/displays adequate comfort level or baseline comfort level  Outcome: Progressing

## 2024-06-20 NOTE — FLOWSHEET NOTE
06/19/24 1944   Vital Signs   Temp 98.6 °F (37 °C)   Temp Source Oral   Pulse 77   Heart Rate Source Monitor   Respirations 20   BP (!) 150/87   MAP (Calculated) 108   BP Location Left lower arm   BP Method Automatic   Patient Position Semi fowlers   Pain Assessment   Pain Assessment 0-10   Pain Level 8   Pain Location Back;Chest   Pain Orientation Right   Pain Descriptors Stabbing   Functional Pain Assessment Prevents or interferes some active activities and ADLs   Pain Type Acute pain   Oxygen Therapy   SpO2 96 %   O2 Device None (Room air)       Shift assessment completed see flow sheet. Patient in bed alert and oriented x4. Patient on RA, showing no signs of distress. Evening medications given per order. Chest tube hooked to wall suction. Patient has no other needs at this time. Call light in reach

## 2024-06-20 NOTE — CONSULTS
Pharmacy Note  Vancomycin Consult    Ángel Stauffer is a 39 y.o. male started on Vancomycin for HAP; consult received from Dr. Jono Reed to manage therapy. Also receiving the following antibiotics: Cefepime.    Allergies:  Patient has no known allergies.       Vancomycin Day of Therapy 3/7  Indication: HAP  Micro: Pleural fluid  Gram positive rods   Gram positive cocci   WBC's   Epithelial Cells   Current Dosing Method: Bayesian-Guided AUC Dosing  Therapeutic Goal: -600 mg/L*hr  Recent Labs     06/18/24  0927 06/19/24  0517 06/19/24  0901 06/20/24  0457   VANCOTROUGH  --   --  18.7  --    WBC  --  8.4  --  9.1   CREATININE 0.9 0.8*  --  0.7*   Estimated Creatinine Clearance: 228 mL/min (A) (based on SCr of 0.7 mg/dL (L)).  Current Dose / Plan:   Decrease to Vancomycin 1500 mg IV q8h.   Kinetics predict an AUC = 523 mg/L*h with an estimated steady-state vancomycin trough = 6.9 mcg/mL.  Next Vancomycin level ordered for 6/21 at 0900.  Will continue to monitor clinical condition and make adjustments to regimen as appropriate.    Thank you for the consult.   Lei Trevino, PharmD 6/20/2024 4:56 PM

## 2024-06-20 NOTE — PROGRESS NOTES
[facility]                                 Pulmonary/Critical care  Consult /Progress Note :                                                                  CC :worsening SOB    24 hours events    Still with discomfort right side on  Room air  Still have right-sided pleuritic chest pain  Had thoracentesis 200 cc was drained   No fever or chills  Some yellow sputum  Slight wheeze and cough  LDH more than 2500     PHYSICAL EXAM:  Vitals:    06/18/24 0740   BP:    Pulse:    Resp:    Temp:    SpO2: 98%     Gen: No distress.   Eyes: PERRL. No sclera icterus. No conjunctival injection.   ENT: No discharge. Pharynx clear.   Neck: Trachea midline. No obvious mass.    Resp: rhonchi bilateral   CV: Regular rate. Regular rhythm. No murmur or rub. No edema. Peripheral pulses are 2+.  Capillary refill is less than 3 seconds.  GI: Non-tender. Non-distended. No hernia.   Skin: Warm and dry. No nodule on exposed extremities.   Lymph: No cervical LAD. No supraclavicular LAD.   M/S: No cyanosis. No joint deformity. No clubbing.   Neuro: Awake. Alert. Moves all four extremities.   Psych: Oriented x 3. No anxiety.     LABS:  CBC:   Recent Labs     06/16/24  0618   WBC 7.2   HGB 9.9*   HCT 28.6*   MCV 89.7          BMP:   Recent Labs     06/15/24  1655 06/16/24  0051 06/16/24  0948   * 135* 137   K 3.9 4.2 3.7    100 100   CO2 24 25 25   BUN 9 12 13   CREATININE 0.9 0.8* 0.8*       LIVER PROFILE:     Microbiology:  pending    Imaging:  Chest imaging was reviewed by me and showed   CT some infiltrate with small effusion right    ASSESSMENT:   Possible pneumonia  Possible irritant induced cough and bronchospasm   CHIDI  Possible empyema   Human rhinovirus    PLAN:  *-CT with loculated effusion and air ,concern   Will ask IR to place chest tube as discuss with Dr Terry and recommended chest tube and transfer Slava if no 
                                                  [facility]                                 Pulmonary/Critical care  Consult /Progress Note :                                                                  CC :worsening SOB    24 hours events    Still with discomfort rightside on  Room air  Still have right-sided pleuritic chest pain  No fever or chills  Some yellow sputum  Slight wheeze and cough      PHYSICAL EXAM:  Vitals:    06/17/24 0837   BP: 133/60   Pulse:    Resp:    Temp:    SpO2:      Gen: No distress.   Eyes: PERRL. No sclera icterus. No conjunctival injection.   ENT: No discharge. Pharynx clear.   Neck: Trachea midline. No obvious mass.    Resp: rhonchi bilateral   CV: Regular rate. Regular rhythm. No murmur or rub. No edema. Peripheral pulses are 2+.  Capillary refill is less than 3 seconds.  GI: Non-tender. Non-distended. No hernia.   Skin: Warm and dry. No nodule on exposed extremities.   Lymph: No cervical LAD. No supraclavicular LAD.   M/S: No cyanosis. No joint deformity. No clubbing.   Neuro: Awake. Alert. Moves all four extremities.   Psych: Oriented x 3. No anxiety.     LABS:  CBC:   Recent Labs     06/15/24  0820 06/16/24  0618   WBC 6.8 7.2   HGB 10.2* 9.9*   HCT 29.9* 28.6*   MCV 91.3 89.7    249       BMP:   Recent Labs     06/15/24  1655 06/16/24  0051 06/16/24  0948   * 135* 137   K 3.9 4.2 3.7    100 100   CO2 24 25 25   BUN 9 12 13   CREATININE 0.9 0.8* 0.8*       LIVER PROFILE:     Microbiology:  pending    Imaging:  Chest imaging was reviewed by me and showed   CT some infiltrate with small effusion right    ASSESSMENT:   Possible pneumonia  Possible irritant induced cough and bronchospasm   CHIDI  Small pleural effusion  Human rhinovirus    PLAN:  *-Sputum cultures so far gram-positive cocci and gram-negative  *-,repeat am   -Sputum culture growing normal  Human rhinovirus and pneumonia panel  Ultrasound shows l 
                                                  [facility]                                 Pulmonary/Critical care  Consult /Progress Note :                                                                  CC :worsening SOB    24 hours events  On RA   Still with discomfort rightside  No chest pain   No fever or chills  Some yellow sputum  Slight wheeze and cough      PHYSICAL EXAM:  Vitals:    06/16/24 0740   BP: 133/74   Pulse: 89   Resp: 18   Temp: 97.6 °F (36.4 °C)   SpO2: 93%     Gen: No distress.   Eyes: PERRL. No sclera icterus. No conjunctival injection.   ENT: No discharge. Pharynx clear.   Neck: Trachea midline. No obvious mass.    Resp: rhonchi bilateral   CV: Regular rate. Regular rhythm. No murmur or rub. No edema. Peripheral pulses are 2+.  Capillary refill is less than 3 seconds.  GI: Non-tender. Non-distended. No hernia.   Skin: Warm and dry. No nodule on exposed extremities.   Lymph: No cervical LAD. No supraclavicular LAD.   M/S: No cyanosis. No joint deformity. No clubbing.   Neuro: Awake. Alert. Moves all four extremities.   Psych: Oriented x 3. No anxiety.     LABS:  CBC:   Recent Labs     06/14/24  0520 06/15/24  0820 06/16/24  0618   WBC 9.1 6.8 7.2   HGB 10.6* 10.2* 9.9*   HCT 31.4* 29.9* 28.6*   MCV 91.6 91.3 89.7    254 249       BMP:   Recent Labs     06/15/24  1655 06/16/24  0051 06/16/24  0948   * 135* 137   K 3.9 4.2 3.7    100 100   CO2 24 25 25   BUN 9 12 13   CREATININE 0.9 0.8* 0.8*       LIVER PROFILE:     Microbiology:  pending    Imaging:  Chest imaging was reviewed by me and showed   CT some infiltrate with small effusion right    ASSESSMENT:   Possible pneumonia  Possible irritant induced cough and bronchospasm   CHIDI  Small pleural effusion  Human rhinovirus    PLAN:  *-Sputum cultures so far gram-positive cocci and gram-negative  *-,repeat am   -Sputum culture growing normal  Human rhinovirus and pneumonia 
                                                  [facility]                                 Pulmonary/Critical care  Consult /Progress Note :                                                                  CC :worsening SOB  24 hours events  On RA   States shortness of breath and cough has improved some  No fever or chills  Some yellow sputum  Slight wheeze and      PHYSICAL EXAM:  Vitals:    06/15/24 1126   BP:    Pulse:    Resp:    Temp:    SpO2: 96%     Gen: No distress.   Eyes: PERRL. No sclera icterus. No conjunctival injection.   ENT: No discharge. Pharynx clear.   Neck: Trachea midline. No obvious mass.    Resp: rhonchi bilateral   CV: Regular rate. Regular rhythm. No murmur or rub. No edema. Peripheral pulses are 2+.  Capillary refill is less than 3 seconds.  GI: Non-tender. Non-distended. No hernia.   Skin: Warm and dry. No nodule on exposed extremities.   Lymph: No cervical LAD. No supraclavicular LAD.   M/S: No cyanosis. No joint deformity. No clubbing.   Neuro: Awake. Alert. Moves all four extremities.   Psych: Oriented x 3. No anxiety.     LABS:  CBC:   Recent Labs     06/13/24  0526 06/14/24  0520 06/15/24  0820   WBC 12.9* 9.1 6.8   HGB 12.5* 10.6* 10.2*   HCT 37.7* 31.4* 29.9*   MCV 91.5 91.6 91.3    267 254       BMP:   Recent Labs     06/14/24  1712 06/15/24  0041 06/15/24  0820   * 135* 136   K 3.8 4.2 3.6   CL 98* 99 100   CO2 25 24 27   BUN 10 10 10   CREATININE 0.9 1.0 1.0       LIVER PROFILE:     Microbiology:  pending    Imaging:  Chest imaging was reviewed by me and showed   CT some infiltrate with small effusion right    ASSESSMENT:   Possible pneumonia  Possible irritant induced cough and bronchospasm   CHIDI  Small pleural effusion  Human rhinovirus    PLAN:  *-Sputum cultures so far gram-positive cocci and gram-negative  *-  -Sputum culture growing normal  Human rhinovirus and pneumonia panel  *-CT scan showing early 
                                                  [facility]                                 Pulmonary/Critical care  Consult /Progress Note :                                                                  CC :worsening SOB  Patient is being seen at the request of christian Mena for a consultation for pneumonia   Subjective   Doing better  SOB improved   No chest pain   On RA       PHYSICAL EXAM:  Vitals:    06/14/24 0718   BP:    Pulse:    Resp:    Temp:    SpO2: 96%     Gen: No distress.   Eyes: PERRL. No sclera icterus. No conjunctival injection.   ENT: No discharge. Pharynx clear.   Neck: Trachea midline. No obvious mass.    Resp: rhonchi bilateral   CV: Regular rate. Regular rhythm. No murmur or rub. No edema. Peripheral pulses are 2+.  Capillary refill is less than 3 seconds.  GI: Non-tender. Non-distended. No hernia.   Skin: Warm and dry. No nodule on exposed extremities.   Lymph: No cervical LAD. No supraclavicular LAD.   M/S: No cyanosis. No joint deformity. No clubbing.   Neuro: Awake. Alert. Moves all four extremities.   Psych: Oriented x 3. No anxiety.     LABS:  CBC:   Recent Labs     06/13/24  0526 06/14/24  0520   WBC 12.9* 9.1   HGB 12.5* 10.6*   HCT 37.7* 31.4*   MCV 91.5 91.6    267       BMP:   Recent Labs     06/13/24  1650 06/14/24  0050 06/14/24  0520    136 136   K 3.9 4.2 4.1   CL 98* 99 99   CO2 26 26 25   BUN 11 15 14   CREATININE 1.0 1.0 1.0       LIVER PROFILE:     Microbiology:  pending    Imaging:  Chest imaging was reviewed by me and showed   CT some infiltrate with small effusion right    ASSESSMENT:   Possible pneumonia  Possible irritant induced cough and bronchospasm   CHIDI  Small pleural effusion      PLAN:  *-Sputum cultures so far gram-positive cocci and gram-negative  *-  *-CT scan showing early parapneumonic effusion  Pneumonia panel rhinovirus  Sputum culture gram-positive cocci and gram-negative  IV steroids   BD and 
   06/13/24 2144   NIV Type   Mode Bilevel   Mask Type Full face mask   Mask Size Large   Assessment   Respirations 16   SpO2 95 %   Settings/Measurements   IPAP 21 cmH20   CPAP/EPAP 17 cmH2O   Vt (Measured) 956 mL   Rate Ordered 12   FiO2  21 %       
   06/14/24 0313   NIV Type   Mode Bilevel   Mask Type Full face mask   Mask Size Large   Assessment   Respirations 18   SpO2 95 %   Settings/Measurements   IPAP 21 cmH20   CPAP/EPAP 17 cmH2O   Vt (Measured) 490 mL   Rate Ordered 12   FiO2  21 %       
   06/14/24 2204   NIV Type   NIV Started/Stopped (S)  On   Equipment Type v60   Mode Bilevel   Mask Type Full face mask   Mask Size Large   Settings/Measurements   IPAP 21 cmH20   CPAP/EPAP 17 cmH2O   Vt (Measured) 877 mL   Rate Ordered 12   FiO2  21 %   Minute Volume (L/min) 11.3 Liters   Mask Leak (lpm) 0 lpm   Alarm Settings   Alarms On Y   Low Pressure (cmH2O) 5 cmH2O   High Pressure (cmH2O) 40 cmH2O   Delay Alarm 20 sec(s)   RR Low (bpm) 10   RR High (bpm) 40 br/min       
   06/16/24 0302   NIV Type   $NIV $Daily Charge   Settings/Measurements   IPAP 21 cmH20   CPAP/EPAP 17 cmH2O   Vt (Measured) 748 mL   Rate Ordered 12   FiO2  21 %   Minute Volume (L/min) 10.7 Liters   Mask Leak (lpm) 0 lpm   Patient's Home Machine No   Alarm Settings   Alarms On Y   Low Pressure (cmH2O) 5 cmH2O   High Pressure (cmH2O) 40 cmH2O   Delay Alarm 20 sec(s)   RR Low (bpm) 10   RR High (bpm) 40 br/min       
   06/17/24 0052   NIV Type   $NIV $Daily Charge   NIV Started/Stopped (S)  On   Equipment Type v60   Mode Bilevel   Mask Type Full face mask   Mask Size Large   Settings/Measurements   IPAP 21 cmH20   CPAP/EPAP 17 cmH2O   Vt (Measured) 749 mL   Rate Ordered 12   FiO2  21 %   Minute Volume (L/min) 11 Liters   Mask Leak (lpm) 0 lpm   Patient's Home Machine No   Alarm Settings   Alarms On Y   Low Pressure (cmH2O) 5 cmH2O   High Pressure (cmH2O) 40 cmH2O   Delay Alarm 20 sec(s)   RR Low (bpm) 10   RR High (bpm) 40 br/min       
   06/17/24 0317   NIV Type   NIV Started/Stopped On   Equipment Type v60   Mode Bilevel   Mask Type Full face mask   Mask Size Large   Settings/Measurements   IPAP 21 cmH20   CPAP/EPAP 17 cmH2O   Vt (Measured) 988 mL   Rate Ordered 12   FiO2  21 %   Minute Volume (L/min) 13 Liters   Mask Leak (lpm) 4 lpm   Patient's Home Machine No       
   06/17/24 9315   NIV Type   NIV Started/Stopped (S)  On   Equipment Type V60   Mode Bilevel   Mask Type Full face mask   Mask Size Large   Assessment   Pulse 80   Respirations 27   SpO2 98 %   Level of Consciousness 0   Comfort Level Good   Using Accessory Muscles No   Mask Compliance Good   Skin Assessment Clean, dry, & intact   Skin Protection for O2 Device Yes   Settings/Measurements   IPAP 21 cmH20   CPAP/EPAP 17 cmH2O   Vt (Measured) 744 mL   Rate Ordered 12   FiO2  21 %   Minute Volume (L/min) 19.7 Liters   Mask Leak (lpm) 7 lpm   Patient's Home Machine No   Alarm Settings   Alarms On Y       
   06/18/24 0241   NIV Type   Equipment Type V60   Mode Bilevel   Mask Type Full face mask   Mask Size Large   Assessment   Pulse 68   Respirations 18   SpO2 98 %   Settings/Measurements   IPAP 21 cmH20   CPAP/EPAP 17 cmH2O   Vt (Measured) 652 mL   Rate Ordered 12   FiO2  21 %   Minute Volume (L/min) 11.7 Liters   Mask Leak (lpm) 12 lpm   Patient's Home Machine No   Alarm Settings   Alarms On Y       
   06/19/24 0200   NIV Type   NIV Started/Stopped (S)  On   Equipment Type V60   Mode Bilevel   Mask Type Full face mask   Mask Size Large   Assessment   Pulse 64   Respirations 17   SpO2 96 %   Settings/Measurements   IPAP 21 cmH20   CPAP/EPAP 17 cmH2O   Vt (Measured) 821 mL   Rate Ordered 12   FiO2  21 %   Minute Volume (L/min) 13.7 Liters   Mask Leak (lpm) 21 lpm   Patient's Home Machine No   Alarm Settings   Alarms On Y       
   06/19/24 2305   NIV Type   NIV Started/Stopped (S)  On   Equipment Type v60   Mode Bilevel   Mask Type Full face mask   Mask Size Large   Settings/Measurements   IPAP 21 cmH20   CPAP/EPAP 17 cmH2O   Vt (Measured) 1101 mL   Rate Ordered 12   FiO2  21 %   I Time/ I Time % 1 s   Minute Volume (L/min) 23 Liters   Mask Leak (lpm) 0 lpm   Patient's Home Machine No   Alarm Settings   Alarms On Y   Low Pressure (cmH2O) 5 cmH2O   High Pressure (cmH2O) 40 cmH2O   Delay Alarm 20 sec(s)   RR Low (bpm) 12   RR High (bpm) 40 br/min       
  Pharmacy Vancomycin Consult     Vancomycin Day:   Current DosinMG Q8H  Current indication: cap x 5      Recent Labs     24  0927 24  0517   BUN 13 15   CREATININE 0.9 0.8*   WBC  --  8.4       Intake/Output Summary (Last 24 hours) at 2024 1000  Last data filed at 2024 0859  Gross per 24 hour   Intake 180 ml   Output 1761 ml   Net -1581 ml       Ht Readings from Last 1 Encounters:   24 1.854 m (6' 1\")        Wt Readings from Last 1 Encounters:   24 (!) 164.2 kg (362 lb)       Body mass index is 47.76 kg/m².    Estimated Creatinine Clearance: 199 mL/min (A) (based on SCr of 0.8 mg/dL (L)).    Trough: 18.7  Regimen: 1750 mg IV every 8 hours.  Start time: 18:00 on 2024  Exposure target: AUC24 (range)400-600 mg/L.hr   [id number]: 572 mg/L.hr  AUC24,ss: 574 mg/L.hr  Probability of AUC24 > 400: 100 %  Ctrough,ss: 17.1 mg/L  Probability of Ctrough,ss > 20: 17 %        Assessment/Plan:  Continue current dosing..    Jennifer Baca [facility]      
  Pharmacy Vancomycin Consult     Vancomycin Day: 3  Current Dosin mg IV every 8 hours  Current indication: HAP    Temp max:  98.4    Recent Labs     24  0520 24  1712 06/15/24  0041 06/15/24  0820   BUN 14   < > 10 10   CREATININE 1.0   < > 1.0 1.0   WBC 9.1  --   --  6.8    < > = values in this interval not displayed.     No intake or output data in the 24 hours ending 06/15/24 1002    Ht Readings from Last 1 Encounters:   24 1.854 m (6' 1\")        Wt Readings from Last 1 Encounters:   06/15/24 (!) 171 kg (377 lb)       Body mass index is 49.74 kg/m².    Estimated Creatinine Clearance: 163 mL/min (based on SCr of 1 mg/dL).    Trough: 12.3 (6/15 0820)    Assessment/Plan:  Will continue the same dose. AUC = 471.   Pharmacy will continue to monitor and adjust as necessary.     
 Patient admitted to room 205 from ER. Side rails up x2. Patient Has an order for telemetry. Bed is locked and in lowest position. Call light placed in patient reach. Patient explained the routine of the hospital, including but not limited to lab work, vital signs, hourly rounding, etc. Care plans and education updated, CHG wipes performed at time of admission.     /74   Pulse (!) 101   Temp 99.3 °F (37.4 °C) (Oral)   Resp 18   Ht 1.854 m (6' 1\")   Wt (!) 168 kg (370 lb 6.4 oz)   SpO2 93%   BMI 48.87 kg/m²     Most recent set of vitals as shown.     Patient has an LDA that does not require CHG wipes, including possible a surgery incision, infante catheter, or a central line.     4 Eyes Skin Assessment     The patient is being assess for   Admission    I agree that 2 RN's have performed a thorough Head to Toe Skin Assessment on the patient. ALL assessment sites listed below have been assessed.      Areas assessed for pressure by both nurses:   [x]   Head, Face, and Ears   [x]   Shoulders, Back, and Chest, Abdomen  [x]   Arms, Elbows, and Hands   [x]   Coccyx, Sacrum, and Ischium  [x]   Legs, Feet, and Heels    No skin issues                     **SHARE this note so that the co-signing nurse is able to place an eSignature**    Co-signer eSignature: Electronically signed by Manpreet Barajas RN on 6/13/24 at 6:24 PM EDT    Does the Patient have Skin Breakdown related to pressure?  No              Bhargav Prevention initiated:  No   Wound Care Orders initiated:  No      [facility] nurse consulted for Pressure Injury (Stage 3,4, Unstageable, DTI, NWPT, Complex wounds)and New or Established Ostomies:  No      Primary Nurse eSignature: Electronically signed by Jimmy Schneider RN on 6/13/24 at 6:22 PM EDT      Bedside Mobility Assessment Tool (BMAT):     Assessment Level 1- Sit and Shake    1. From a semi-reclined position, ask patient to sit up and rotate to a seated position at the side of the bed. Can use the bedrail. 
/81   Pulse 74   Temp 98.4 °F (36.9 °C) (Oral)   Resp 18   Ht 1.854 m (6' 1\")   Wt (!) 170.2 kg (375 lb 4.8 oz)   SpO2 100%   BMI 49.51 kg/m²     Patient alert and oriented resting in bed, With continuous pulse ox. Right chest tube insitu, dressing clean, dry and intact, draining with pus like drainage. Assessment completed. Respiration easy, even and unlabored. Patient tolerated night meds well. Still complains of pin over the chest tube area, PRN Morphine given as ordered. Refer to MAR. Call light and bedside table within reach. Bed at lowest position, locked, side rails x2. Pt denies needs at this time.      
4 Eyes Skin Assessment     NAME:  Ángel Stauffer  YOB: 1985  MEDICAL RECORD NUMBER:  [id number]    The patient is being assessed for  Transfer to New Unit    I agree that at least one RN has performed a thorough Head to Toe Skin Assessment on the patient. ALL assessment sites listed below have been assessed.      Areas assessed by both nurses:    Head, Face, Ears, Shoulders, Back, Chest, Arms, Elbows, Hands, Sacrum. Buttock, Coccyx, Ischium, Legs. Feet and Heels, and Under Medical Devices       Chest tube RLB.      Does the Patient have a Wound? No noted wound(s)       Bhargav Prevention initiated by RN: No  Wound Care Orders initiated by RN: No    Pressure Injury (Stage 3,4, Unstageable, DTI, NWPT, and Complex wounds) if present, place Wound referral order by RN under : No    New Ostomies, if present place, Ostomy referral order under : No     Nurse 1 eSignature: Electronically signed by Adrianne Vela RN on 6/18/24 at 11:26 PM EDT    **SHARE this note so that the co-signing nurse can place an eSignature**    Nurse 2 eSignature: {Esignature:[id number]}    
AM assessment completed, see flow sheet. Pt is alert and oriented. Vital signs are WNL. Respirations are even & easy on RA. Productive cough this AM and specimen sent to lab. No complaints voiced. Pt denies needs at this time. SR up x 2, and bed in low position. Call light is within reach.    
Admit: 2024    Name:  Ángel Stauffer  [id number]  MRN:    [id number]     Daily Progress Note for 6/15/2024   Admitted with sepsis and pneumonia    Interval History:     No fevers overnight. Has cough sometimes dry sometimes productive. On room air. Still just does not feel good. Also he can tell his gout is starting to flair up left foot. He has long history of gout.     Scheduled Meds:   ipratropium 0.5 mg-albuterol 2.5 mg  1 Dose Inhalation 4x Daily RT    cefepime  2,000 mg IntraVENous Q8H    sodium chloride flush  5-40 mL IntraVENous 2 times per day    enoxaparin  40 mg SubCUTAneous BID    losartan  100 mg Oral Daily    budesonide  0.5 mg Nebulization BID RT       Continuous Infusions:   sodium chloride         PRN Meds:  sodium chloride flush, sodium chloride, ondansetron **OR** ondansetron, polyethylene glycol, acetaminophen **OR** acetaminophen, perflutren lipid microspheres, ketorolac, ipratropium 0.5 mg-albuterol 2.5 mg       Objective:     Temp  Av.3 °F (37.4 °C)  Min: 98 °F (36.7 °C)  Max: 102.7 °F (39.3 °C)  Pulse  Av.6  Min: 91  Max: 118  BP  Min: 120/64  Max: 143/79  SpO2  Av %  Min: 63 %  Max: 98 %  FiO2   Av %  Min: 21 %  Max: 21 %  Patient Vitals for the past 4 hrs:   BP Temp Temp src Pulse Resp SpO2   06/15/24 0900 127/68 98.1 °F (36.7 °C) Oral (!) 109 18 97 %   06/15/24 0730 123/74 98 °F (36.7 °C) Oral 91 -- 98 %   06/15/24 0719 -- -- -- -- -- 97 %           Intake/Output Summary (Last 24 hours) at 6/15/2024 1059  Last data filed at 6/15/2024 1013  Gross per 24 hour   Intake 240 ml   Output --   Net 240 ml         Physical Exam:    Gen: No distress. Alert. Pleasant male.   Eyes: PERRL. No sclera icterus. No conjunctival injection.   ENT: No discharge. Pharynx clear.   Neck: No JVD.  Trachea midline.  Resp: No accessory muscle use. No crackles. No wheezes. No rhonchi. + diminished RLL.  CV: Regular rate. Regular rhythm. No murmur.  No rub. No edema.   Capillary 
Admit: 2024    Name:  Ángel Stauffer  Room:  [facility]-  MRN:    [id number]     Daily Progress Note for 2024   Admitted with sepsis and pneumonia  Interval History:   No fevers overnight    Scheduled Meds:   ipratropium 0.5 mg-albuterol 2.5 mg  1 Dose Inhalation 4x Daily RT    sodium chloride flush  5-40 mL IntraVENous 2 times per day    enoxaparin  40 mg SubCUTAneous BID    cefepime  2,000 mg IntraVENous q8h    vancomycin  1,500 mg IntraVENous Q12H    losartan  100 mg Oral Daily    budesonide  0.5 mg Nebulization BID RT       Continuous Infusions:   sodium chloride         PRN Meds:  sodium chloride flush, sodium chloride, ondansetron **OR** ondansetron, polyethylene glycol, acetaminophen **OR** acetaminophen, perflutren lipid microspheres, ketorolac, ipratropium 0.5 mg-albuterol 2.5 mg                  Objective:     Temp  Av.7 °F (37.1 °C)  Min: 98.4 °F (36.9 °C)  Max: 99.3 °F (37.4 °C)  Pulse  Av.5  Min: 81  Max: 101  BP  Min: 119/59  Max: 151/76  SpO2  Av.5 %  Min: 92 %  Max: 96 %  FiO2   Av %  Min: 21 %  Max: 21 %  Patient Vitals for the past 4 hrs:   SpO2   24 0718 96 %         Intake/Output Summary (Last 24 hours) at 2024 0739  Last data filed at 2024 1313  Gross per 24 hour   Intake --   Output 350 ml   Net -350 ml       Physical Exam:    Gen: No distress. Alert. Pleasant male.   Eyes: PERRL. No sclera icterus. No conjunctival injection.   ENT: No discharge. Pharynx clear.   Neck: No JVD.  Trachea midline.  Resp: No accessory muscle use. No crackles. No wheezes. No rhonchi. + diminished RLL.  CV: Regular rate. Regular rhythm. No murmur.  No rub. No edema.   Capillary Refill: Brisk,< 3 seconds   Peripheral Pulses: +2 palpable, equal bilaterally   GI: Non-tender. Non-distended.  Normal bowel sounds.  Skin: Warm and dry. No nodule on exposed extremities. No rash on exposed extremities.   M/S: No cyanosis. No joint deformity. No clubbing.   Neuro: Awake. Grossly 
Admit: 2024    Name:  Ángel Stauffer  Room:  [facility]50[facility]-  MRN:    [id number]     Daily Progress Note for 2024   Admitted with sepsis and pneumonia    Interval History:     No fevers overnight. Has a cough. sometimes dry and sometimes productive. On room air. Still just does not feel good. Doesn't understand why he needs antibiotics still if pneumonia is worsening.   Pulmonology planning thoracentesis tomorrow.     Scheduled Meds:   ipratropium 0.5 mg-albuterol 2.5 mg  1 Dose Inhalation BID    predniSONE  40 mg Oral Daily    cefepime  2,000 mg IntraVENous Q8H    sodium chloride flush  5-40 mL IntraVENous 2 times per day    enoxaparin  40 mg SubCUTAneous BID    losartan  100 mg Oral Daily    budesonide  0.5 mg Nebulization BID RT       Continuous Infusions:   sodium chloride 5 mL/hr at 06/15/24 1412       PRN Meds:  sodium chloride flush, sodium chloride, ondansetron **OR** ondansetron, polyethylene glycol, acetaminophen **OR** acetaminophen, perflutren lipid microspheres, ipratropium 0.5 mg-albuterol 2.5 mg       Objective:     Temp  Av.1 °F (36.7 °C)  Min: 97.6 °F (36.4 °C)  Max: 98.8 °F (37.1 °C)  Pulse  Av.8  Min: 76  Max: 104  BP  Min: 118/66  Max: 133/74  SpO2  Av.9 %  Min: 92 %  Max: 96 %  FiO2   Av %  Min: 21 %  Max: 21 %  No data found.      Intake/Output Summary (Last 24 hours) at 2024 1223  Last data filed at 2024 0909  Gross per 24 hour   Intake 1440 ml   Output --   Net 1440 ml       Physical Exam:    Gen: No distress. Alert. Pleasant male.   Eyes: PERRL. No sclera icterus. No conjunctival injection.   ENT: No discharge. Pharynx clear.   Neck: No JVD.  Trachea midline.  Resp: No accessory muscle use. No crackles. No wheezes. No rhonchi. + diminished RLL.  CV: Regular rate. Regular rhythm. No murmur.  No rub. No edema.   Capillary Refill: Brisk,< 3 seconds   Peripheral Pulses: +2 palpable, equal bilaterally   GI: Non-tender. Non-distended.  Normal bowel sounds.  Skin: 
Bedside Mobility Assessment Tool (BMAT):     Assessment Level 1- Sit and Shake    1. From a semi-reclined position, ask patient to sit up and rotate to a seated position at the side of the bed. Can use the bedrail.    2. Ask patient to reach out and grab your hand and shake making sure patient reaches across his/her midline.   Pass- Patient is able to come to a seated position, maintain core strength. Maintains seated balance while reaching across midline. Move on to Assessment Level 2.     Assessment Level 2- Stretch and Point   1. With patient in seated position at the side of the bed, have patient place both feet on the floor (or stool) with knees no higher than hips.    2. Ask patient to stretch one leg and straighten the knee, then bend the ankle/flex and point the toes. If appropriate, repeat with the other leg.   Pass- Patient is able to demonstrate appropriate quad strength on intended weight bearing limb(s). Move onto Assessment Level 3.     Assessment Level 3- Stand   1. Ask patient to elevate off the bed or chair (seated to standing) using an assistive device (cane, bedrail).    2. Patient should be able to raise buttocks off be and hold for a count of five. May repeat once.   Pass- Patient maintains standing stability for at least 5 seconds, proceed to assessment level 4.    Assessment Level 4- Walk   1. Ask patient to march in place at bedside.    2. Then ask patient to advance step and return each foot. Some medical conditions may render a patient from stepping backwards, use your best clinical judgement.   Pass- Patient demonstrates balance while shifting weight and ability to step, takes independent steps, does not use assistive device patient is MOBILITY LEVEL 4.      Mobility Level- 4   
Bedside Mobility Assessment Tool (BMAT):     Assessment Level 1- Sit and Shake    1. From a semi-reclined position, ask patient to sit up and rotate to a seated position at the side of the bed. Can use the bedrail.    2. Ask patient to reach out and grab your hand and shake making sure patient reaches across his/her midline.   Pass- Patient is able to come to a seated position, maintain core strength. Maintains seated balance while reaching across midline. Move on to Assessment Level 2.     Assessment Level 2- Stretch and Point   1. With patient in seated position at the side of the bed, have patient place both feet on the floor (or stool) with knees no higher than hips.    2. Ask patient to stretch one leg and straighten the knee, then bend the ankle/flex and point the toes. If appropriate, repeat with the other leg.   Pass- Patient is able to demonstrate appropriate quad strength on intended weight bearing limb(s). Move onto Assessment Level 3.     Assessment Level 3- Stand   1. Ask patient to elevate off the bed or chair (seated to standing) using an assistive device (cane, bedrail).    2. Patient should be able to raise buttocks off be and hold for a count of five. May repeat once.   Pass- Patient maintains standing stability for at least 5 seconds, proceed to assessment level 4.    Assessment Level 4- Walk   1. Ask patient to march in place at bedside.    2. Then ask patient to advance step and return each foot. Some medical conditions may render a patient from stepping backwards, use your best clinical judgement.   Pass- Patient demonstrates balance while shifting weight and ability to step, takes independent steps, does not use assistive device patient is MOBILITY LEVEL 4.      Mobility Level- 4    
Bedside report given and pt care transferred to Jyoti MILLER. Pt denies needs at this time. Call light within reach.  
Bedside report given and pt care transferred to Mariam MILLER. Pt denies needs at this time. Call light within reach.  
Bedside report given to ANGELA Silver. Care transferred.     
Bedside report given to Chloe MILLER  pt in stable condition no needs at this time. Call light within reach   
Bedside report given to Chloe MILLER  pt in stable condition no needs at this time. Call light within reach   
Bedside report given to Viktoriya MILLER  pt in stable condition no needs at this time. Call light within reach   
Bedside report given to Viktoriya MILLER  pt in stable condition no needs at this time. Call light within reach   
Blood pressure (!) 144/77, pulse 74, temperature 97.5 °F (36.4 °C), temperature source Oral, resp. rate 20, height 1.854 m (6' 1\"), weight (!) 170.2 kg (375 lb 4.8 oz), SpO2 100 %.    Patient arrived from [facility] via bed. Vss. Chest tube to gravity.   
C/o right chest pain where chest tube is inserted. Rate as 7/10. Tylenol 650 mg po given. See mar.  
Called report to Slava.     Zoya Cha RN    
Care transferred from Mariam MILLER   
Chest tube unclamped at this time. Placed to -20cm suction.   
Chest tube unclamped, placed to -20cm continuous suction.   
Consent for chest tube signed and placed in chart.  
Down via wheelchair to IR department in stable condition for picc line.  
Hand off report given to  ANGELA Kenny.   Patient is stable showing no signs of distress and has no current needs at this time.   Call light is in reach and bed is in lowest position.    Care is transferred at this time.     
Handoff report and transfer of care given at bedside to Jyoti RN .  Patient in stable condition, denies needs/concerns at this time.  Call light within reach.     
Handoff report and transfer of care given at bedside to Saskia MILLER.  Patient in stable condition, denies needs/concerns at this time.  Call light within reach.     
Made pt NPO for chest tube placement today. Informed pt. Verbalized understanding.  
PIV noted to be infiltrated at 0600. PIV removed and new PIV placed.   
Patient to be admitted to room 439 at [facility]. Estimated  time is 1415. Patient aware.      Zoya Cha RN    
Patient to be transferred to [facility] today.     Zoya Cha RN    
Prevention  dressing applied to bridge of nose and other facial bony prominences upon BiPAP/CPAP initiation.  Consulted RN regarding the assessment of skin integrity.     
Progress Note    Admit Date:  6/13/2024    Sepsis   Pneumonia with parapneumonic effusion-loculated s/p thoracentesis yesterday and chest tube placement     Subjective:  Mr. Stauffer today is having significant right sided chest pain after chest tube insertion. Pain with deep breaths     Objective:   Patient Vitals for the past 4 hrs:   BP Temp Temp src Pulse Resp SpO2   06/19/24 0951 -- -- -- -- 16 --   06/19/24 0816 -- -- -- -- -- 97 %   06/19/24 0753 (!) 150/93 97.7 °F (36.5 °C) Oral 67 18 98 %          Intake/Output Summary (Last 24 hours) at 6/19/2024 1027  Last data filed at 6/19/2024 0859  Gross per 24 hour   Intake 180 ml   Output 1761 ml   Net -1581 ml       Physical Exam:    Gen: No distress. Alert. +Pleasant young male   Eyes: PERRL. No sclera icterus. No conjunctival injection.   Neck: No JVD.  Trachea midline.  Resp: No accessory muscle use. No crackles. No wheezes. No rhonchi. +Diminished breath sounds right sided ++right side chest tube   CV: Regular rate. Regular rhythm. No murmur.  No rub. No edema.   Peripheral Pulses: +2 palpable, equal bilaterally   GI: Non-tender. Non-distended.  Normal bowel sounds.  Skin: Warm and dry. No nodule on exposed extremities. No rash on exposed extremities.   M/S: No cyanosis. No joint deformity. No clubbing.   Neuro: Awake. Grossly nonfocal    Psych: Oriented x 3. No anxiety or agitation.         Medications:  ALTEplase (CATHFLO) 10 mg in sodium chloride 0.9 % 30 mL, 10 mg, Q12H   And  dornase alpha (PULMOZYME) 5 mg in sterile water 30 mL, 5 mg, Q12H  vancomycin, 1,750 mg, Q8H  lactobacillus, 1 capsule, Daily with breakfast  ipratropium 0.5 mg-albuterol 2.5 mg, 1 Dose, BID  iron sucrose (VENOFER) 100 mg in sodium chloride 0.9 % 100 mL IVPB, 100 mg, Q24H  cefepime, 2,000 mg, Q8H  sodium chloride flush, 5-40 mL, 2 times per day  enoxaparin, 40 mg, BID  losartan, 100 mg, Daily  budesonide, 0.5 mg, BID RT      PRN Medications:  morphine, 4 mg, Q4H PRN  sodium chloride 
Pt appears to be sleeping at this time as manifested by eyes being closed. Respirations easy. No distress noted. BIPAP on at this time   
Pt awake at this time. Pt took himself off BIPAP. AM meds given. Pt denies any needs. Call light within reach   
Pt awake at this time. Took himself off BIPAP. IV antibiotics given at this time. Pt denies any needs. Call light within reach   
Pt called out requesting Prednisone for his gout flair up. Message sent to  at this time         [id number]  Dr on call wants this addressed on dayshift  
Pt spike temp 102.7 orally, medicated see MAR. Pt refused ice packs or fan for comfort. Pt refused offer to lower temp as warm in room. Pt stated he goes from hot to chills to frequently and prefers \"to leave it where it is.\" Pt has call light for needs. Water pitcher refilled per request.   
Pt transferred to PCU. Assessment performed and documented (see flow sheets). Pt oriented to room. Pt provided with call light. Pt bed locked and in lowest position. Pt denies any needs at this time.  
Pts SpO2 dropped while on BIPAP. RN went to check pt and pt had the mask half off but was sleeping. RN tried to fix mask, pt woke up and decided he was done wearing it for the night. SpO2 93% RA.   
Pulmozyme and Cathflo instilled in chest tube by Dr. Hills at this time.   Chest tube to remain clamped at this time x2 hours.  
Pulmozyme and Cathflo instilled in chest tube by Dr. Hills at this time.   Chest tube to remain clamped at this time.   RN to place chest tube to -20cm suction after 2hrs.   
RT Inhaler-Nebulizer Bronchodilator Protocol Note    There is a bronchodilator order in the chart from a provider indicating to follow the RT Bronchodilator Protocol and there is an “Initiate RT Inhaler-Nebulizer Bronchodilator Protocol” order as well (see protocol at bottom of note).    CXR Findings:  No results found.    The findings from the last RT Protocol Assessment were as follows:   History Pulmonary Disease: (P) Chronic pulmonary disease  Respiratory Pattern: (P) Regular pattern and RR 12-20 bpm  Breath Sounds: (P) Slightly diminished and/or crackles  Cough: (P) Strong, spontaneous, non-productive  Indication for Bronchodilator Therapy: (P) Decreased or absent breath sounds  Bronchodilator Assessment Score: (P) 4    Aerosolized bronchodilator medication orders have been revised according to the RT Inhaler-Nebulizer Bronchodilator Protocol below.    Respiratory Therapist to perform RT Therapy Protocol Assessment initially then follow the protocol.  Repeat RT Therapy Protocol Assessment PRN for score 0-3 or on second treatment, BID, and PRN for scores above 3.    No Indications - adjust the frequency to every 6 hours PRN wheezing or bronchospasm, if no treatments needed after 48 hours then discontinue using Per Protocol order mode.     If indication present, adjust the RT bronchodilator orders based on the Bronchodilator Assessment Score as indicated below.  Use Inhaler orders unless patient has one or more of the following: on home nebulizer, not able to hold breath for 10 seconds, is not alert and oriented, cannot activate and use MDI correctly, or respiratory rate 25 breaths per minute or more, then use the equivalent nebulizer order(s) with same Frequency and PRN reasons based on the score.  If a patient is on this medication at home then do not decrease Frequency below that used at home.    0-3 - enter or revise RT bronchodilator order(s) to equivalent RT Bronchodilator order with Frequency of every 4 
RT Inhaler-Nebulizer Bronchodilator Protocol Note    There is a bronchodilator order in the chart from a provider indicating to follow the RT Bronchodilator Protocol and there is an “Initiate RT Inhaler-Nebulizer Bronchodilator Protocol” order as well (see protocol at bottom of note).    CXR Findings:  No results found.    The findings from the last RT Protocol Assessment were as follows:   History Pulmonary Disease: Chronic pulmonary disease  Respiratory Pattern: Regular pattern and RR 12-20 bpm  Breath Sounds: Slightly diminished and/or crackles  Cough: Strong, spontaneous, non-productive  Indication for Bronchodilator Therapy: Decreased or absent breath sounds  Bronchodilator Assessment Score: 4    Aerosolized bronchodilator medication orders have been revised according to the RT Inhaler-Nebulizer Bronchodilator Protocol below.    Respiratory Therapist to perform RT Therapy Protocol Assessment initially then follow the protocol.  Repeat RT Therapy Protocol Assessment PRN for score 0-3 or on second treatment, BID, and PRN for scores above 3.    No Indications - adjust the frequency to every 6 hours PRN wheezing or bronchospasm, if no treatments needed after 48 hours then discontinue using Per Protocol order mode.     If indication present, adjust the RT bronchodilator orders based on the Bronchodilator Assessment Score as indicated below.  Use Inhaler orders unless patient has one or more of the following: on home nebulizer, not able to hold breath for 10 seconds, is not alert and oriented, cannot activate and use MDI correctly, or respiratory rate 25 breaths per minute or more, then use the equivalent nebulizer order(s) with same Frequency and PRN reasons based on the score.  If a patient is on this medication at home then do not decrease Frequency below that used at home.    0-3 - enter or revise RT bronchodilator order(s) to equivalent RT Bronchodilator order with Frequency of every 4 hours PRN for wheezing or 
RT Inhaler-Nebulizer Bronchodilator Protocol Note    There is a bronchodilator order in the chart from a provider indicating to follow the RT Bronchodilator Protocol and there is an “Initiate RT Inhaler-Nebulizer Bronchodilator Protocol” order as well (see protocol at bottom of note).    CXR Findings:  No results found.    The findings from the last RT Protocol Assessment were as follows:   History Pulmonary Disease: None or smoker <15 pack years  Respiratory Pattern: Dyspnea on exertion or RR 21-25 bpm  Breath Sounds: Slightly diminished and/or crackles  Cough: Strong, spontaneous, non-productive  Indication for Bronchodilator Therapy: Decreased or absent breath sounds  Bronchodilator Assessment Score: 4    Aerosolized bronchodilator medication orders have been revised according to the RT Inhaler-Nebulizer Bronchodilator Protocol below.    Respiratory Therapist to perform RT Therapy Protocol Assessment initially then follow the protocol.  Repeat RT Therapy Protocol Assessment PRN for score 0-3 or on second treatment, BID, and PRN for scores above 3.    No Indications - adjust the frequency to every 6 hours PRN wheezing or bronchospasm, if no treatments needed after 48 hours then discontinue using Per Protocol order mode.     If indication present, adjust the RT bronchodilator orders based on the Bronchodilator Assessment Score as indicated below.  Use Inhaler orders unless patient has one or more of the following: on home nebulizer, not able to hold breath for 10 seconds, is not alert and oriented, cannot activate and use MDI correctly, or respiratory rate 25 breaths per minute or more, then use the equivalent nebulizer order(s) with same Frequency and PRN reasons based on the score.  If a patient is on this medication at home then do not decrease Frequency below that used at home.    0-3 - enter or revise RT bronchodilator order(s) to equivalent RT Bronchodilator order with Frequency of every 4 hours PRN for 
RT Inhaler-Nebulizer Bronchodilator Protocol Note    There is a bronchodilator order in the chart from a provider indicating to follow the RT Bronchodilator Protocol and there is an “Initiate RT Inhaler-Nebulizer Bronchodilator Protocol” order as well (see protocol at bottom of note).    CXR Findings:  No results found.    The findings from the last RT Protocol Assessment were as follows:   History Pulmonary Disease: None or smoker <15 pack years  Respiratory Pattern: Regular pattern and RR 12-20 bpm  Breath Sounds: Slightly diminished and/or crackles  Cough: Strong, spontaneous, non-productive  Indication for Bronchodilator Therapy: Decreased or absent breath sounds  Bronchodilator Assessment Score: 2    Aerosolized bronchodilator medication orders have been revised according to the RT Inhaler-Nebulizer Bronchodilator Protocol below.    Respiratory Therapist to perform RT Therapy Protocol Assessment initially then follow the protocol.  Repeat RT Therapy Protocol Assessment PRN for score 0-3 or on second treatment, BID, and PRN for scores above 3.    No Indications - adjust the frequency to every 6 hours PRN wheezing or bronchospasm, if no treatments needed after 48 hours then discontinue using Per Protocol order mode.     If indication present, adjust the RT bronchodilator orders based on the Bronchodilator Assessment Score as indicated below.  Use Inhaler orders unless patient has one or more of the following: on home nebulizer, not able to hold breath for 10 seconds, is not alert and oriented, cannot activate and use MDI correctly, or respiratory rate 25 breaths per minute or more, then use the equivalent nebulizer order(s) with same Frequency and PRN reasons based on the score.  If a patient is on this medication at home then do not decrease Frequency below that used at home.    0-3 - enter or revise RT bronchodilator order(s) to equivalent RT Bronchodilator order with Frequency of every 4 hours PRN for wheezing 
RT Inhaler-Nebulizer Bronchodilator Protocol Note    There is a bronchodilator order in the chart from a provider indicating to follow the RT Bronchodilator Protocol and there is an “Initiate RT Inhaler-Nebulizer Bronchodilator Protocol” order as well (see protocol at bottom of note).    CXR Findings:  XR CHEST PORTABLE    Result Date: 6/16/2024  Increased opacity right lower lobe suggesting right lower lobe pneumonia with an adjacent pleural effusion       The findings from the last RT Protocol Assessment were as follows:   History Pulmonary Disease: None or smoker <15 pack years  Respiratory Pattern: Regular pattern and RR 12-20 bpm  Breath Sounds: Slightly diminished and/or crackles  Cough: Strong, spontaneous, non-productive  Indication for Bronchodilator Therapy: Decreased or absent breath sounds  Bronchodilator Assessment Score: 2    Aerosolized bronchodilator medication orders have been revised according to the RT Inhaler-Nebulizer Bronchodilator Protocol below.    Respiratory Therapist to perform RT Therapy Protocol Assessment initially then follow the protocol.  Repeat RT Therapy Protocol Assessment PRN for score 0-3 or on second treatment, BID, and PRN for scores above 3.    No Indications - adjust the frequency to every 6 hours PRN wheezing or bronchospasm, if no treatments needed after 48 hours then discontinue using Per Protocol order mode.     If indication present, adjust the RT bronchodilator orders based on the Bronchodilator Assessment Score as indicated below.  Use Inhaler orders unless patient has one or more of the following: on home nebulizer, not able to hold breath for 10 seconds, is not alert and oriented, cannot activate and use MDI correctly, or respiratory rate 25 breaths per minute or more, then use the equivalent nebulizer order(s) with same Frequency and PRN reasons based on the score.  If a patient is on this medication at home then do not decrease Frequency below that used at 
RT Inhaler-Nebulizer Bronchodilator Protocol Note    There is a bronchodilator order in the chart from a provider indicating to follow the RT Bronchodilator Protocol and there is an “Initiate RT Inhaler-Nebulizer Bronchodilator Protocol” order as well (see protocol at bottom of note).    CXR Findings:  XR CHEST PORTABLE    Result Date: 6/16/2024  Increased opacity right lower lobe suggesting right lower lobe pneumonia with an adjacent pleural effusion       The findings from the last RT Protocol Assessment were as follows:   History Pulmonary Disease: None or smoker <15 pack years  Respiratory Pattern: Regular pattern and RR 12-20 bpm  Breath Sounds: Slightly diminished and/or crackles  Cough: Strong, spontaneous, non-productive  Indication for Bronchodilator Therapy: Decreased or absent breath sounds  Bronchodilator Assessment Score: 2    Aerosolized bronchodilator medication orders have been revised according to the RT Inhaler-Nebulizer Bronchodilator Protocol below.    Respiratory Therapist to perform RT Therapy Protocol Assessment initially then follow the protocol.  Repeat RT Therapy Protocol Assessment PRN for score 0-3 or on second treatment, BID, and PRN for scores above 3.    No Indications - adjust the frequency to every 6 hours PRN wheezing or bronchospasm, if no treatments needed after 48 hours then discontinue using Per Protocol order mode.     If indication present, adjust the RT bronchodilator orders based on the Bronchodilator Assessment Score as indicated below.  Use Inhaler orders unless patient has one or more of the following: on home nebulizer, not able to hold breath for 10 seconds, is not alert and oriented, cannot activate and use MDI correctly, or respiratory rate 25 breaths per minute or more, then use the equivalent nebulizer order(s) with same Frequency and PRN reasons based on the score.  If a patient is on this medication at home then do not decrease Frequency below that used at 
RT Inhaler-Nebulizer Bronchodilator Protocol Note    There is a bronchodilator order in the chart from a provider indicating to follow the RT Bronchodilator Protocol and there is an “Initiate RT Inhaler-Nebulizer Bronchodilator Protocol” order as well (see protocol at bottom of note).    CXR Findings:  XR CHEST PORTABLE    Result Date: 6/17/2024  Slight interval improvement in pleuroparenchymal disease of the right lung status post thoracentesis.  No pneumothorax.       The findings from the last RT Protocol Assessment were as follows:   History Pulmonary Disease: Chronic pulmonary disease  Respiratory Pattern: Regular pattern and RR 12-20 bpm  Breath Sounds: Slightly diminished and/or crackles  Cough: Strong, spontaneous, non-productive  Indication for Bronchodilator Therapy: Decreased or absent breath sounds  Bronchodilator Assessment Score: 4    Aerosolized bronchodilator medication orders have been revised according to the RT Inhaler-Nebulizer Bronchodilator Protocol below.    Respiratory Therapist to perform RT Therapy Protocol Assessment initially then follow the protocol.  Repeat RT Therapy Protocol Assessment PRN for score 0-3 or on second treatment, BID, and PRN for scores above 3.    No Indications - adjust the frequency to every 6 hours PRN wheezing or bronchospasm, if no treatments needed after 48 hours then discontinue using Per Protocol order mode.     If indication present, adjust the RT bronchodilator orders based on the Bronchodilator Assessment Score as indicated below.  Use Inhaler orders unless patient has one or more of the following: on home nebulizer, not able to hold breath for 10 seconds, is not alert and oriented, cannot activate and use MDI correctly, or respiratory rate 25 breaths per minute or more, then use the equivalent nebulizer order(s) with same Frequency and PRN reasons based on the score.  If a patient is on this medication at home then do not decrease Frequency below that used 
RT Inhaler-Nebulizer Bronchodilator Protocol Note    There is a bronchodilator order in the chart from a provider indicating to follow the RT Bronchodilator Protocol and there is an “Initiate RT Inhaler-Nebulizer Bronchodilator Protocol” order as well (see protocol at bottom of note).    CXR Findings:  XR CHEST PORTABLE    Result Date: 6/17/2024  Slight interval improvement in pleuroparenchymal disease of the right lung status post thoracentesis.  No pneumothorax.     XR CHEST PORTABLE    Result Date: 6/16/2024  Increased opacity right lower lobe suggesting right lower lobe pneumonia with an adjacent pleural effusion       The findings from the last RT Protocol Assessment were as follows:   History Pulmonary Disease: Chronic pulmonary disease  Respiratory Pattern: Regular pattern and RR 12-20 bpm  Breath Sounds: Slightly diminished and/or crackles  Cough: Strong, spontaneous, non-productive  Indication for Bronchodilator Therapy: Decreased or absent breath sounds  Bronchodilator Assessment Score: 4    Aerosolized bronchodilator medication orders have been revised according to the RT Inhaler-Nebulizer Bronchodilator Protocol below.    Respiratory Therapist to perform RT Therapy Protocol Assessment initially then follow the protocol.  Repeat RT Therapy Protocol Assessment PRN for score 0-3 or on second treatment, BID, and PRN for scores above 3.    No Indications - adjust the frequency to every 6 hours PRN wheezing or bronchospasm, if no treatments needed after 48 hours then discontinue using Per Protocol order mode.     If indication present, adjust the RT bronchodilator orders based on the Bronchodilator Assessment Score as indicated below.  Use Inhaler orders unless patient has one or more of the following: on home nebulizer, not able to hold breath for 10 seconds, is not alert and oriented, cannot activate and use MDI correctly, or respiratory rate 25 breaths per minute or more, then use the equivalent nebulizer 
RT Inhaler-Nebulizer Bronchodilator Protocol Note    There is a bronchodilator order in the chart from a provider indicating to follow the RT Bronchodilator Protocol and there is an “Initiate RT Inhaler-Nebulizer Bronchodilator Protocol” order as well (see protocol at bottom of note).    CXR Findings:  XR CHEST PORTABLE    Result Date: 6/17/2024  Slight interval improvement in pleuroparenchymal disease of the right lung status post thoracentesis.  No pneumothorax.     XR CHEST PORTABLE    Result Date: 6/16/2024  Increased opacity right lower lobe suggesting right lower lobe pneumonia with an adjacent pleural effusion       The findings from the last RT Protocol Assessment were as follows:   History Pulmonary Disease: Chronic pulmonary disease  Respiratory Pattern: Regular pattern and RR 12-20 bpm  Breath Sounds: Slightly diminished and/or crackles  Cough: Strong, spontaneous, non-productive  Indication for Bronchodilator Therapy: Decreased or absent breath sounds  Bronchodilator Assessment Score: 4    Aerosolized bronchodilator medication orders have been revised according to the RT Inhaler-Nebulizer Bronchodilator Protocol below.    Respiratory Therapist to perform RT Therapy Protocol Assessment initially then follow the protocol.  Repeat RT Therapy Protocol Assessment PRN for score 0-3 or on second treatment, BID, and PRN for scores above 3.    No Indications - adjust the frequency to every 6 hours PRN wheezing or bronchospasm, if no treatments needed after 48 hours then discontinue using Per Protocol order mode.     If indication present, adjust the RT bronchodilator orders based on the Bronchodilator Assessment Score as indicated below.  Use Inhaler orders unless patient has one or more of the following: on home nebulizer, not able to hold breath for 10 seconds, is not alert and oriented, cannot activate and use MDI correctly, or respiratory rate 25 breaths per minute or more, then use the equivalent nebulizer 
RT Nebulizer Bronchodilator Protocol Note    There is a bronchodilator order in the chart from a provider indicating to follow the RT Bronchodilator Protocol and there is an “Initiate RT Bronchodilator Protocol” order as well (see protocol at bottom of note).    CXR Findings:  XR CHEST PORTABLE    Result Date: 6/16/2024  Increased opacity right lower lobe suggesting right lower lobe pneumonia with an adjacent pleural effusion       The findings from the last RT Protocol Assessment were as follows:  Smoking: (P) None or smoker <15 pack years  Respiratory Pattern: (P) Regular pattern and RR 12-20 bpm  Breath Sounds: (P) Slightly diminished and/or crackles  Cough: (P) Strong, spontaneous, non-productive  Indication for Bronchodilator Therapy: (P) Decreased or absent breath sounds  Bronchodilator Assessment Score: (P) 2    Aerosolized bronchodilator medication orders have been revised according to the RT Nebulizer Bronchodilator Protocol below.    Respiratory Therapist to perform RT Therapy Protocol Assessment initially then follow the protocol.  Repeat RT Therapy Protocol Assessment PRN for score 0-3 or on second treatment, BID, and PRN for scores above 3.    No Indications - adjust the frequency to every 6 hours PRN wheezing or bronchospasm, if no treatments needed after 48 hours then discontinue using Per Protocol order mode.     If indication present, adjust the RT bronchodilator orders based on the Bronchodilator Assessment Score as indicated below.  If a patient is on this medication at home then do not decrease Frequency below that used at home.    0-3 - enter or revise RT bronchodilator order(s) to equivalent RT Bronchodilator order with Frequency of every 4 hours PRN for wheezing or increased work of breathing using Per Protocol order mode.       4-6 - enter or revise RT Bronchodilator order(s) to two equivalent RT bronchodilator orders with one order with BID Frequency and one order with Frequency of every 4 
Reassessment completed and documented accordingly. Pt resting in bed with eyes closed. Respirations even and unlabored. No signs of distress noted. Call light in reach, bed in lowest position with bed wheels locked.   
Report given @ bedside to  Jyoti MILLER, for transferral of care. Pt resting in bed. Call light in reach.     
Resting in bed awake watching tv. No complaints or needs at present time. Call light in reach.   
Resting in bed awake. No complaints or needs at present time. AM assessment complete. Alert and oriented. Pt NPO for thoracentesis this am. Call light in reach.    Bedside Mobility Assessment Tool (BMAT):     Assessment Level 1- Sit and Shake    1. From a semi-reclined position, ask patient to sit up and rotate to a seated position at the side of the bed. Can use the bedrail.    2. Ask patient to reach out and grab your hand and shake making sure patient reaches across his/her midline.   Pass- Patient is able to come to a seated position, maintain core strength. Maintains seated balance while reaching across midline. Move on to Assessment Level 2.     Assessment Level 2- Stretch and Point   1. With patient in seated position at the side of the bed, have patient place both feet on the floor (or stool) with knees no higher than hips.    2. Ask patient to stretch one leg and straighten the knee, then bend the ankle/flex and point the toes. If appropriate, repeat with the other leg.   Pass- Patient is able to demonstrate appropriate quad strength on intended weight bearing limb(s). Move onto Assessment Level 3.     Assessment Level 3- Stand   1. Ask patient to elevate off the bed or chair (seated to standing) using an assistive device (cane, bedrail).    2. Patient should be able to raise buttocks off be and hold for a count of five. May repeat once.   Pass- Patient maintains standing stability for at least 5 seconds, proceed to assessment level 4.    Assessment Level 4- Walk   1. Ask patient to march in place at bedside.    2. Then ask patient to advance step and return each foot. Some medical conditions may render a patient from stepping backwards, use your best clinical judgement.   Pass- Patient demonstrates balance while shifting weight and ability to step, takes independent steps, does not use assistive device patient is MOBILITY LEVEL 4.      Mobility Level- 4    
Resting in bed awake. No complaints or needs at present time. Am assessment complete. Alert and oriented. Call light in reach.    Bedside Mobility Assessment Tool (BMAT):     Assessment Level 1- Sit and Shake    1. From a semi-reclined position, ask patient to sit up and rotate to a seated position at the side of the bed. Can use the bedrail.    2. Ask patient to reach out and grab your hand and shake making sure patient reaches across his/her midline.   Pass- Patient is able to come to a seated position, maintain core strength. Maintains seated balance while reaching across midline. Move on to Assessment Level 2.     Assessment Level 2- Stretch and Point   1. With patient in seated position at the side of the bed, have patient place both feet on the floor (or stool) with knees no higher than hips.    2. Ask patient to stretch one leg and straighten the knee, then bend the ankle/flex and point the toes. If appropriate, repeat with the other leg.   Pass- Patient is able to demonstrate appropriate quad strength on intended weight bearing limb(s). Move onto Assessment Level 3.     Assessment Level 3- Stand   1. Ask patient to elevate off the bed or chair (seated to standing) using an assistive device (cane, bedrail).    2. Patient should be able to raise buttocks off be and hold for a count of five. May repeat once.   Pass- Patient maintains standing stability for at least 5 seconds, proceed to assessment level 4.    Assessment Level 4- Walk   1. Ask patient to march in place at bedside.    2. Then ask patient to advance step and return each foot. Some medical conditions may render a patient from stepping backwards, use your best clinical judgement.   Pass- Patient demonstrates balance while shifting weight and ability to step, takes independent steps, does not use assistive device patient is MOBILITY LEVEL 4.      Mobility Level- 4    
Resting in bed awake. No complaints or needs at present time. Chest tube in place and draining pus yellow drainage. Call light in reach.  
Returned to room via bed in stable condition. Chest tube to right upper back intact draining pussy looking drainage. No needs at present time. Call light in reach. 98% on room air.  
Shift assessment complete; see flow sheet.  Scheduled medications administered; See MAR.  IV infusing without difficulty.  Pt denies pain at this time. Pt denies any needs at this time. Pt educated on use of call light and to call out with needs, verbalized understanding, bed in low locked position for pt safety    
Sitting up in chair awake. No complaints or needs at present time. Call light in reach.  
Telemetry monitor removed and returned to [facility]. Attempted to give report to transport staff. Staff member responsible not receptive to handoff report. Morphine given prior to ambulation. Pt left facility in stable condition to [place] with all of their personal belongings.      Zoya Cha RN    
Transferred care to ANGELA Silver. Patient informed he will be transferred to .    
4 hours PRN for wheezing or increased work of breathing using Per Protocol order mode.        4-6 - enter or revise RT Bronchodilator order(s) to two equivalent RT bronchodilator orders with one order with BID Frequency and one order with Frequency of every 4 hours PRN wheezing or increased work of breathing using Per Protocol order mode.        7-10 - enter or revise RT Bronchodilator order(s) to two equivalent RT bronchodilator orders with one order with TID Frequency and one order with Frequency of every 4 hours PRN wheezing or increased work of breathing using Per Protocol order mode.       11-13 - enter or revise RT Bronchodilator order(s) to one equivalent RT bronchodilator order with QID Frequency and an Albuterol order with Frequency of every 4 hours PRN wheezing or increased work of breathing using Per Protocol order mode.      Greater than 13 - enter or revise RT Bronchodilator order(s) to one equivalent RT bronchodilator order with every 4 hours Frequency and an Albuterol order with Frequency of every 2 hours PRN wheezing or increased work of breathing using Per Protocol order mode.         Electronically signed by Katharine Patterson RCP on 6/13/2024 at 7:57 PM  
monitor closely for accumulation given BMI 48.87.   Pharmacy will continue to monitor patient and adjust therapy as indicated    Thank you for the consult,  Silvana Coronado [facility]  6/14/2024 12:21 PM   
pneumonia  Possible irritant induced cough and bronchospasm   CHIDI  Possible empyema   Human rhinovirus    PLAN:  *-CT with loculated effusion and air ,concern     Third dose  tPA and Pulmozyme given  LDH 2500   Human rhinovirus and pneumonia panel  concern for empyma.on cefepime and   Vancomycin .,  Gram positive rods            Gram positive cocci   Discuss with CT that patinet still with chest tube pouring pus he wanted now pt transferred [place] ,possible VATS  P.o. prednisone stop   BD and ICS  IV vancomycin and cefepime   Start BiPAP ,home 21/17 will try 18/15 with continuios pulse ox, case management working to get him home machine  DVT   Transfer PCU  
start tPA and Pulmozyme second dose this evening  -Sputum culture growing normal  LDH 2500 .empyma until proven otherwise  Human rhinovirus and pneumonia panel  concern for empyma.on cefepime and   Vancomycin .,  Gram positive rods            Gram positive cocci   Will do CAT scan tomorrow evening and if things is not great we will arrange for transfer as requested by Dr. Terry   Lab was sent for thoracentesis and to test the fluid for parapneumonic effusion versus empyema( fluid   Sputum culture gram-positive cocci and gram-negative so far normal masha will continue to follow culture  Will look with US as CXR worse ,if fluid large enough will drain   P.o. prednisone  BD and ICS  IV vancomycin and cefepime   Start BiPAP ,home 21/17 will try 18/15 with continuios pulse ox, case management working to get him home machine  DVT   Transfer PCU  
wheezing or increased work of breathing using Per Protocol order mode.        4-6 - enter or revise RT Bronchodilator order(s) to two equivalent RT bronchodilator orders with one order with BID Frequency and one order with Frequency of every 4 hours PRN wheezing or increased work of breathing using Per Protocol order mode.        7-10 - enter or revise RT Bronchodilator order(s) to two equivalent RT bronchodilator orders with one order with TID Frequency and one order with Frequency of every 4 hours PRN wheezing or increased work of breathing using Per Protocol order mode.       11-13 - enter or revise RT Bronchodilator order(s) to one equivalent RT bronchodilator order with QID Frequency and an Albuterol order with Frequency of every 4 hours PRN wheezing or increased work of breathing using Per Protocol order mode.      Greater than 13 - enter or revise RT Bronchodilator order(s) to one equivalent RT bronchodilator order with every 4 hours Frequency and an Albuterol order with Frequency of every 2 hours PRN wheezing or increased work of breathing using Per Protocol order mode.     RT to enter RT Home Evaluation for COPD & MDI Assessment order using Per Protocol order mode.    Electronically signed by Katya Velez on 6/16/2024 at 7:18 AM  
lymphadenopathy is likely reactive.         XR CHEST (2 VW)   Final Result   Moderate right pleural effusion with compressive atelectasis in the right   lung base.         CT CHEST WO CONTRAST    (Results Pending)             Assessment & Plan:     Principal Problem:    Pneumonia due to infectious organism  Active Problems:    Hyponatremia    Parapneumonic effusion    Primary hypertension    Tachycardia    Shortness of breath  Resolved Problems:    * No resolved hospital problems. *       #Sepsis- POA  - Source: Pneumonia   - Criteria:  HR, WBC  - Admit to 2W  - Blood & Resp cx, pneumonia panel   - monitor vitals and labs  - mgmt as below      #Pneumonia   #RLL parapneumonic effusion -appears to be empyema  #COPD without AE  - Health care acquired: suspect a gram negative organism, also risk for MRSA  - procal 0.30  - check cultures, MRSA swab, strep, legionella, pneumonia panel -> + rhinovirus   - Cefepime D#6  - inhaled bronchodilators   - pulmonology consulted   -Will need follow-up CT in 4 to 6 weeks to look for resolution  --Thoracentesis done.  Pleural fluid analysis concerning for empyema.  Chest tube placement versus transfer to [place] for CT surgery consult.  -MRSA probe neg  -Sputum culture so far normal resp masha- continue to follow  -Vancomycin resumed       #CHIDI  -sleep study results in Media tab      #Hyponatremia- resolved  - likely 2/2 poor po intake and excessive fluid intake yesterday  - hold IVFs for now  - monitor BMP  -Resolved     #HTN  - on losartan  - monitor BP     #Normocytic anemia  - unable to see outside labs, unclear if chronic  - iron studies reveal deficiency.    -Added venofer.  - no reports of bleeding  - monitor CBC, stable     #Gout flare  - started prednisone          Note above makes patient higher risk for morbidity and mortality requiring testing and treatment.       Lovenox  Full code  Gen diet             RYAN GUZMÁN MD 6/18/2024 11:59 AM       
and excessive fluid intake yesterday  - hold IVFs for now  - monitor BMP  -Resolved     #HTN  - on losartan  - monitor BP     #Normocytic anemia  - unable to see outside labs, unclear if chronic  - iron studies reveal deficiency.    -Added venofer.  - no reports of bleeding  - monitor CBC, stable     #Gout flare  - started prednisone          Note above makes patient higher risk for morbidity and mortality requiring testing and treatment.       Lovenox  Full code  Gen diet     PATSY Garcia - CNP  06/17/24  11:54 AM        RYAN GUZMÁN MD 6/17/2024 12:01 PM

## 2024-06-20 NOTE — FLOWSHEET NOTE
06/20/24 0821   Vital Signs   Temp 97.8 °F (36.6 °C)   Temp Source Oral   Pulse (!) 104   Heart Rate Source Monitor   Respirations 17   BP (!) 158/94   MAP (Calculated) 115   BP Location Left lower arm   BP Method Automatic   Patient Position Sitting   Pain Assessment   Pain Assessment 0-10   Pain Level 10   Oxygen Therapy   SpO2 97 %   O2 Device None (Room air)     Vitals and assessment completed. No s/s of distress. Chest tube in place, assessment done. Drainage remains purulent. Dr. Hills flushed chest tube with dornase and cathflo. Patient in excruciating 10/10 pain, grunting and yelling in pain. PRN Morphine given with scheduled AM meds.  Patient also requesting something for muscle spasms, MD aware. No further needs at this time.     Zoya Cha RN

## 2024-06-20 NOTE — PROGRESS NOTES
Patient admitted to room 439 from [facility].  Patient oriented to room, call light, bed rails, phone, lights and bathroom.  Patient instructed about the schedule of the day including: vital sign frequency, lab draws, possible tests, frequency of MD and staff rounds, daily weights, and I &O's. Telemetry box in place, patient aware of placement and reason.  Bed locked, in lowest position, side rails up 2/4, call light within reach.

## 2024-06-20 NOTE — H&P
V2.0  History and Physical      Name:  Ángel Stauffer /Age/Sex: 1985  (39 y.o. male)   MRN & CSN:  [id number] & [id number] Encounter Date/Time: 2024 4:32 PM EDT   Location:  [facility]/0439-01 PCP: Eddie Calvillo DO       Hospital Day: 1    Assessment and Plan:   Ángel Stauffer is a 39 y.o. male with a pmh of essential hypertension, who presents with Empyema lung (HCC)        Right-sided parapneumonic effusion, likely empyema  HCAP  Patient initially admitted to [facility] on 2024, with persistent fever up to 102 °F, headache, cough, dizziness; was reportedly discharged 3 weeks prior on levofloxacin and azithromycin for pneumonia; on this admission initially found to have small pleural effusion.  Pleural effusion continued to appear to worsen on serial x-rays and was evaluated with bedside ultrasound by pulmonology team-found to have moderate loculated fluid.  CT-guided chest tube placed on 2024 by IR team -immediate return of tan purulent fluid.  MIST protocol started by pulmonology team with intrapleural instillation of tPA/Pulmozyme-patient received 3 doses 12 hours apart, last instillation on  at 7am.  Patient was transferred to [facility] for evaluation for VATS with cardiothoracic surgery.  -Infectious workup reviewed-pneumonia panel showing human rhinovirus; respiratory culture growing gram-positive rods, gram-positive cocci and gram-negative rods; pleural fluid culture from 2024 also grew gram-positive rods and gram-positive cocci.  -Continue vancomycin and cefepime.  Pharmacy to dose vancomycin.  -CT surgery consult placed  -Pulmonology consult placed  -Infectious disease consult placed    Rhinovirus infection, symptomatic management  COPD, budesonide nebulizer, DuoNeb nebulizer  HTN, continue losartan 100 mg once daily  Normocytic anemia, continue to monitor  Gout, s/p prednisone 5 days  CHIDI, continue home BiPAP    Disposition:     Diet ADULT DIET; Regular   DVT

## 2024-06-20 NOTE — DISCHARGE SUMMARY
methylPREDNISolone 4 MG tablet  Commonly known as: MEDROL (MOODY)  Take by mouth.           * This list has 2 medication(s) that are the same as other medications prescribed for you. Read the directions carefully, and ask your doctor or other care provider to review them with you.                    Discharged in stable condition to [facility]    Follow Up:  Follow up with physician at [facility]    MERISSA Merritt  06/20/24  11:26 AM

## 2024-06-20 NOTE — FLOWSHEET NOTE
06/20/24 1226   Vital Signs   Temp 97.6 °F (36.4 °C)   Temp Source Oral   Pulse 81   Heart Rate Source Monitor   Respirations 16   BP (!) 154/87   MAP (Calculated) 109   BP Location Left lower arm   BP Method Automatic   Patient Position Sitting   Pain Assessment   Pain Assessment 0-10   Pain Level 2   Oxygen Therapy   SpO2 96 %   O2 Device None (Room air)     Vitals and reassessment completed. No significant changes. Patient's pain reduced to 2/10. No further needs at this time.    Zoya Cha RN

## 2024-06-20 NOTE — PROCEDURES
Procedure Note   Dose 3  After cleaning chest tube port ,with alcohol swap   Instill tPA and Pulmozyme intra Pleural,right side ,no blood lose  Date 6/ 20  ,7 am   Consent verbal  Under sterile procedure field ,tPA and Pulmozyme were isnerted   Ask staff clamp 2 h   Patient tolerated dose   Next dose today pm

## 2024-06-20 NOTE — PROCEDURES
Procedure Note   Dose 2  After cleaning chest tube port ,with alcohol swap   Instill tPA and Pulmozyme intra Pleural,right side ,no blood lose  Date 6/19 ,6 pm   Consent verbal  Under sterile procedure field ,tPA and Pulmozyme were isnerted   Ask staff clamp 2 h   Patient tolerated dose   Next dose tomorrow  am

## 2024-06-20 NOTE — DISCHARGE INSTR - COC
Continuity of Care Form    Patient Name: Ángel Stauffer   :  1985  MRN:  [id number]    Admit date:  2024  Discharge date:  2024      Code Status Order: Full Code   Advance Directives:     Admitting Physician:  Justo Amezcua MD  PCP: Eddie Calvillo DO    Discharging Nurse: ANGELA Kenny  Discharging Hospital Unit/Room#: /0326-01  Discharging Unit Phone Number: [phone]    Emergency Contact:   Extended Emergency Contact Information  Primary Emergency Contact: Radha Stauffer  Address: [address] [place] [address]  Home Phone: [phone]  Relation: Spouse    Past Surgical History:  Past Surgical History:   Procedure Laterality Date    CHOLECYSTECTOMY      CT GUIDED CHEST TUBE  2024    CT GUIDED CHEST TUBE 2024 [facility] CT SCAN    OTHER SURGICAL HISTORY Left     tendon repair in hand       Immunization History:     There is no immunization history on file for this patient.    Active Problems:  Patient Active Problem List   Diagnosis Code    Morbid obesity with BMI of 50.0-59.9, adult (Formerly [facility]) E66.01, Z68.43    Pneumonia due to infectious organism J18.9    Hyponatremia E87.1    Parapneumonic effusion J18.9, J91.8    Primary hypertension I10    Tachycardia R00.0    Shortness of breath R06.02    Empyema (Formerly [facility]) J86.9       Isolation/Infection:   Isolation            Droplet          Patient Infection Status       Infection Onset Added Last Indicated Last Indicated By Review Planned Expiration Resolved Resolved By    Rhinovirus 24 Pneumonia Panel, Molecular 24                         Nurse Assessment:  Last Vital Signs: BP (!) 154/87   Pulse 81   Temp 97.6 °F (36.4 °C) (Oral)   Resp 16   Ht 1.854 m (6' 1\")   Wt (!) 164.2 kg (362 lb)   SpO2 96%   BMI 47.76 kg/m²     Last documented pain score (0-10 scale): Pain Level: 2  Last Weight:   Wt Readings from Last 1 Encounters:   24 (!) 164.2

## 2024-06-20 NOTE — CARE COORDINATION
CM attempting to obtain BiPap for pt. Need notes from sleep study from 5 years ago. Working with [facility] to help ascertain notes and bipap  
INTERDISCIPLINARY PLAN OF CARE CONFERENCE    Date/Time: 6/17/2024 4:33 PM  Completed by: Marjorie Burks RN, Case Management      Patient Name:  Ángel Stauffer  YOB: 1985  Admitting Diagnosis: Shortness of breath [R06.02]  Parapneumonic effusion [J18.9, J91.8]  Hyponatremia [E87.1]  Tachycardia [R00.0]  Pneumonia of right lower lobe due to infectious organism [J18.9]  Pneumonia due to infectious organism [J18.9]     Admit Date/Time:  6/13/2024  5:22 AM    Chart reviewed. Interdisciplinary team contacted or reviewed plan related to patient progress and discharge plans.   Disciplines included Case Management, Nursing, and Dietitian.    Current Status:Stable  PT/OT recommendation for discharge plan of care: N/A    Expected D/C Disposition:  Home  Confirmed plan with patient and/or family Yes confirmed with: (name) wife  Met with:patient and wife  Discharge Plan Comments: Reviewed chart and met with pt and wife. Plan remains for home at dc. Spoke with Allison LEPE who states is working on home bipap for pt and she will complete this process. Will follow for dc needs.    Home O2 in place on admit: No  Pt informed of need to bring portable home O2 tank on day of discharge for nursing to connect prior to leaving:  Not Indicated  Verbalized agreement/Understanding:  Not Indicated   
Met with patient as he requested to speak to a CM.    The patient is in need of a cpap machine. His original machine was either lost or destroyed. The patient has had a sleep study - years ago. The patient is scheduled to have a sleep study at [facility] for setting adjustments.    A copy of the sleep study - performed on 11/19/16 is in [facility].   CM faxed sleep study and face sheet to [facility] to review. Will also need Dr. Hills's note/order.   
Pt to transfer to [facility] for additional procedures  
Received call from [facility]. Need order signed. Zion will fax order to CM and CM will have Dr. Hills sign the order.       
No  Plans to Return to Present Housing: Yes  Other Identified Issues/Barriers to RETURNING to current housing: None  Potential Assistance needed at discharge: N/A            Potential DME:    Patient expects to discharge to: House  Plan for transportation at discharge:      Financial    Payor: BCBS / Plan: BCBS OUT OF STATE / Product Type: *No Product type* /     Does insurance require precert for SNF: Yes    Potential assistance Purchasing Medications: No  Meds-to-Beds request: Yes      [facility] - [place], [place] - [address] [facility] [address] - P [phone] - F [phone]  [address] [address] [place] [address]  Phone: [phone] Fax: [phone]      Notes:    Factors facilitating achievement of predicted outcomes: Family support, Motivated, Cooperative, Pleasant, and Sense of humor    Barriers to discharge: None    Additional Case Management Notes: Reviewed chart and met with pt. Role of CM explained.  lives home with wife and children. Plan is to return. [facility] is fully IPTA. Anticipate no needs at dc but will follow peripherally for med needs. Noted pt is currently on RA. [facility] has sleep study appt re scheduled for 6/29 as he had appt tonite and had to cancel.     The Plan for Transition of Care is related to the following treatment goals of Shortness of breath [R06.02]  Parapneumonic effusion [J18.9, J91.8]  Hyponatremia [E87.1]  Tachycardia [R00.0]  Pneumonia of right lower lobe due to infectious organism [J18.9]  Pneumonia due to infectious organism [J18.9]    IF APPLICABLE: The Patient and/or patient representative Ángel and his family were provided with a choice of provider and agrees with the discharge plan. Freedom of choice list with basic dialogue that supports the patient's individualized plan of care/goals and shares the quality data associated with the providers was provided to:     Patient Representative Name:       The Patient and/or Patient Representative Agree with the

## 2024-06-21 ENCOUNTER — APPOINTMENT (OUTPATIENT)
Dept: CT IMAGING | Age: 39
End: 2024-06-21
Attending: INTERNAL MEDICINE
Payer: COMMERCIAL

## 2024-06-21 ENCOUNTER — ANESTHESIA EVENT (OUTPATIENT)
Dept: OPERATING ROOM | Age: 39
End: 2024-06-21
Payer: COMMERCIAL

## 2024-06-21 ENCOUNTER — APPOINTMENT (OUTPATIENT)
Dept: GENERAL RADIOLOGY | Age: 39
End: 2024-06-21
Attending: INTERNAL MEDICINE
Payer: COMMERCIAL

## 2024-06-21 ENCOUNTER — HOSPITAL ENCOUNTER (INPATIENT)
Dept: VASCULAR LAB | Age: 39
Discharge: HOME | End: 2024-06-23
Attending: INTERNAL MEDICINE
Payer: COMMERCIAL

## 2024-06-21 PROBLEM — M79.601 RIGHT ARM PAIN: Status: ACTIVE | Noted: 2024-06-21

## 2024-06-21 LAB
ANION GAP SERPL CALCULATED.3IONS-SCNC: 9 MMOL/L (ref 3–16)
BUN SERPL-MCNC: 14 MG/DL (ref 7–20)
CALCIUM SERPL-MCNC: 8.5 MG/DL (ref 8.3–10.6)
CHLORIDE SERPL-SCNC: 97 MMOL/L (ref 99–110)
CO2 SERPL-SCNC: 27 MMOL/L (ref 21–32)
CREAT SERPL-MCNC: 1 MG/DL (ref 0.9–1.3)
GFR SERPLBLD CREATININE-BSD FMLA CKD-EPI: >90 ML/MIN/{1.73_M2}
GLUCOSE SERPL-MCNC: 95 MG/DL (ref 70–99)
POTASSIUM SERPL-SCNC: 4.1 MMOL/L (ref 3.5–5.1)
SODIUM SERPL-SCNC: 133 MMOL/L (ref 136–145)
VANCOMYCIN SERPL-MCNC: 10.9 UG/ML

## 2024-06-21 PROCEDURE — 2580000003 HC RX 258: Performed by: INTERNAL MEDICINE

## 2024-06-21 PROCEDURE — 94660 CPAP INITIATION&MGMT: CPT

## 2024-06-21 PROCEDURE — 6360000002 HC RX W HCPCS: Performed by: INTERNAL MEDICINE

## 2024-06-21 PROCEDURE — 6370000000 HC RX 637 (ALT 250 FOR IP): Performed by: INTERNAL MEDICINE

## 2024-06-21 PROCEDURE — 99221 1ST HOSP IP/OBS SF/LOW 40: CPT

## 2024-06-21 PROCEDURE — 71250 CT THORAX DX C-: CPT

## 2024-06-21 PROCEDURE — 71045 X-RAY EXAM CHEST 1 VIEW: CPT

## 2024-06-21 PROCEDURE — 93971 EXTREMITY STUDY: CPT | Performed by: SURGERY

## 2024-06-21 PROCEDURE — 94640 AIRWAY INHALATION TREATMENT: CPT

## 2024-06-21 PROCEDURE — 80202 ASSAY OF VANCOMYCIN: CPT

## 2024-06-21 PROCEDURE — 2060000000 HC ICU INTERMEDIATE R&B

## 2024-06-21 PROCEDURE — 36415 COLL VENOUS BLD VENIPUNCTURE: CPT

## 2024-06-21 PROCEDURE — 99223 1ST HOSP IP/OBS HIGH 75: CPT | Performed by: INTERNAL MEDICINE

## 2024-06-21 PROCEDURE — 80048 BASIC METABOLIC PNL TOTAL CA: CPT

## 2024-06-21 PROCEDURE — 93971 EXTREMITY STUDY: CPT

## 2024-06-21 PROCEDURE — 99223 1ST HOSP IP/OBS HIGH 75: CPT | Performed by: STUDENT IN AN ORGANIZED HEALTH CARE EDUCATION/TRAINING PROGRAM

## 2024-06-21 RX ADMIN — METHOCARBAMOL 750 MG: 500 TABLET ORAL at 21:54

## 2024-06-21 RX ADMIN — LOSARTAN POTASSIUM 100 MG: 100 TABLET, FILM COATED ORAL at 11:21

## 2024-06-21 RX ADMIN — METHOCARBAMOL 750 MG: 500 TABLET ORAL at 13:19

## 2024-06-21 RX ADMIN — IPRATROPIUM BROMIDE AND ALBUTEROL SULFATE 1 DOSE: 2.5; .5 SOLUTION RESPIRATORY (INHALATION) at 20:35

## 2024-06-21 RX ADMIN — SODIUM CHLORIDE, PRESERVATIVE FREE 10 ML: 5 INJECTION INTRAVENOUS at 20:00

## 2024-06-21 RX ADMIN — BUDESONIDE 500 MCG: 0.5 SUSPENSION RESPIRATORY (INHALATION) at 20:35

## 2024-06-21 RX ADMIN — IPRATROPIUM BROMIDE AND ALBUTEROL SULFATE 1 DOSE: 2.5; .5 SOLUTION RESPIRATORY (INHALATION) at 08:10

## 2024-06-21 RX ADMIN — PIPERACILLIN AND TAZOBACTAM 3375 MG: 3; .375 INJECTION, POWDER, LYOPHILIZED, FOR SOLUTION INTRAVENOUS at 23:57

## 2024-06-21 RX ADMIN — ENOXAPARIN SODIUM 40 MG: 100 INJECTION SUBCUTANEOUS at 21:53

## 2024-06-21 RX ADMIN — MORPHINE SULFATE 4 MG: 4 INJECTION, SOLUTION INTRAMUSCULAR; INTRAVENOUS at 11:21

## 2024-06-21 RX ADMIN — CEFEPIME 2000 MG: 2 INJECTION, POWDER, FOR SOLUTION INTRAVENOUS at 02:43

## 2024-06-21 RX ADMIN — BUDESONIDE 500 MCG: 0.5 SUSPENSION RESPIRATORY (INHALATION) at 08:10

## 2024-06-21 RX ADMIN — SODIUM CHLORIDE, PRESERVATIVE FREE 10 ML: 5 INJECTION INTRAVENOUS at 11:21

## 2024-06-21 RX ADMIN — VANCOMYCIN HYDROCHLORIDE 1500 MG: 10 INJECTION, POWDER, LYOPHILIZED, FOR SOLUTION INTRAVENOUS at 10:28

## 2024-06-21 RX ADMIN — ENOXAPARIN SODIUM 40 MG: 100 INJECTION SUBCUTANEOUS at 11:20

## 2024-06-21 RX ADMIN — PIPERACILLIN AND TAZOBACTAM 3375 MG: 3; .375 INJECTION, POWDER, LYOPHILIZED, FOR SOLUTION INTRAVENOUS at 15:13

## 2024-06-21 ASSESSMENT — PAIN - FUNCTIONAL ASSESSMENT: PAIN_FUNCTIONAL_ASSESSMENT: PREVENTS OR INTERFERES SOME ACTIVE ACTIVITIES AND ADLS

## 2024-06-21 ASSESSMENT — PAIN SCALES - GENERAL
PAINLEVEL_OUTOF10: 8
PAINLEVEL_OUTOF10: 2

## 2024-06-21 ASSESSMENT — PAIN DESCRIPTION - LOCATION: LOCATION: CHEST;BACK;RIB CAGE

## 2024-06-21 ASSESSMENT — PAIN DESCRIPTION - ORIENTATION: ORIENTATION: RIGHT

## 2024-06-21 NOTE — CONSULTS
pallor.  Neck: supple, symmetrical, trachea midline, no lymphadenopathy, no jugular venous distension, no carotid bruits and MASSES:  no masses.  No thyromegaly.  Lungs: no increased work of breathing, good air exchange, no retractions and no crackles or wheezing.  No tactile fremitus.  Cardiovascular:  regular rate and rhythm, S1, S2 normal, no murmur, click, rub or gallop. Apical impulse in 5th intercostal space.  Pulses: Right dorsalis pedis 2, Left dorsalis pedis 2, Right posterior tibial 2, Left Posterior tibial 2, Right radial 2, and Left radial 2.    Abdomen:  hypoactive bowel sounds, non-tender, aorta normal and bruits absent.  Musculoskeletal: Back is straight and non-tender, full ROM of upper and lower extremities. No kyphosis or scoliosis.  Extremities:  Warm, pink, no clubbing, cyanosis, petechiae, ischemia, or deformities. No peripheral edema.  Skin: no rashes, no jaundice, no acanthosis nigricans, no striae  Neurological/Psychiatric: oriented, normal    DATA:  CBC with Differential:    Lab Results   Component Value Date/Time    WBC 9.1 06/20/2024 04:57 AM    RBC 3.98 06/20/2024 04:57 AM    HGB 12.1 06/20/2024 04:57 AM    HCT 35.9 06/20/2024 04:57 AM     06/20/2024 04:57 AM    MCV 90.2 06/20/2024 04:57 AM    MCH 30.3 06/20/2024 04:57 AM    MCHC 33.7 06/20/2024 04:57 AM    RDW 13.5 06/20/2024 04:57 AM    BANDSPCT 3 06/20/2024 04:57 AM    METASPCT 1 06/20/2024 04:57 AM    LYMPHOPCT 16.0 06/20/2024 04:57 AM    MONOPCT 9.0 06/20/2024 04:57 AM    MYELOPCT 1 06/20/2024 04:57 AM    EOSPCT 1.0 06/20/2024 04:57 AM    BASOPCT 0.0 06/20/2024 04:57 AM    MONOSABS 0.8 06/20/2024 04:57 AM    EOSABS 0.1 06/20/2024 04:57 AM    BASOSABS 0.0 06/20/2024 04:57 AM     BMP:    Lab Results   Component Value Date/Time     06/21/2024 05:21 AM    K 4.1 06/21/2024 05:21 AM    K 3.7 06/16/2024 09:48 AM    CL 97 06/21/2024 05:21 AM    CO2 27 06/21/2024 05:21 AM    BUN 14 06/21/2024 05:21 AM    CREATININE 1.0 06/21/2024  05:21 AM    CALCIUM 8.5 06/21/2024 05:21 AM    LABGLOM >90 06/21/2024 05:21 AM    GLUCOSE 95 06/21/2024 05:21 AM     6/21 CXR: IMPRESSION:  Decreased pleural-parenchymal disease on the right    6/21 CT Chest: IMPRESSION:  Decreased right-sided pleural effusion, with small amount of pleural fluid  and pleural gas remaining.  There is incomplete re-expansion of the right  lung and residual consolidation in the right base     Small mediastinal nodes are noted, likely reactive     Splenomegaly    ASSESSMENT AND PLAN:  40 y/o male with pmhx of COPD, GERD, CHIDI not on BIPAP, HLD, HTN, & gout who is admitted as a transfer from [facility] with concern of empyema, despite thoracentesis and tPA dornase administration X3.    Labs and imaging reviewed.  Agree with continuing antibiotics.  Patient has been scheduled for VATS with decortication procedure for Monday, June 24.    - Continue ABX  - Chest tube to remain to suction  - Scheduled for VATS with decort on Monday June 24 @ 12:30 PM  - Pain control & remainder of care per primary team    Karlene Zaragoza PA-C  6/21/2024  10:20 AM

## 2024-06-21 NOTE — PROGRESS NOTES
In-Patient Progress Note    Patient:  Ángel Stauffer 39 y.o. male MRN: [id number]     Date of Service: 6/21/2024    Hospital Day: 2      Chief complaint: had no chief complaint listed for this encounter.      Assessment and Plan   Ángel Stauffer, a 39 y.o. male, with a history of  essential hypertension, was admitted on 6/20/2024 with complaints of had no chief complaint listed for this encounter.    Assessment and plan    Right-sided parapneumonic effusion, likely empyema  HCAP  Patient initially admitted to [facility] on 6/13/2024, with persistent fever up to 102 °F, headache, cough, dizziness; was reportedly discharged 3 weeks prior on levofloxacin and azithromycin for pneumonia; on this admission initially found to have small pleural effusion.  Pleural effusion continued to appear to worsen on serial x-rays and was evaluated with bedside ultrasound by pulmonology team-found to have moderate loculated fluid.  CT-guided chest tube placed on 6/18/2024 by IR team -immediate return of tan purulent fluid.  MIST protocol started by pulmonology team with intrapleural instillation of tPA/Pulmozyme-patient received 3 doses 12 hours apart, last instillation on 6/20 at 7am.  Patient was transferred to [facility] for evaluation for VATS with cardiothoracic surgery.  -Infectious workup reviewed-pneumonia panel showing human rhinovirus; respiratory culture growing gram-positive rods, gram-positive cocci and gram-negative rods; pleural fluid culture from 6/17/2024 also grew gram-positive rods and gram-positive cocci.  -ID team onboard - consolidated regimen to just zosyn for added anaerobic coverage; vancomycin discontinued  -CT surgery onboard - plan for VATS on Monday 6/24/2024 at 1230 pm  -Pulmonology team onboard- appreciate recs     Rhinovirus infection, symptomatic management  COPD, budesonide nebulizer, DuoNeb nebulizer  HTN, continue losartan 100 mg once daily  Normocytic anemia, continue to monitor  Gout, s/p

## 2024-06-21 NOTE — CONSULTS
Pulmonary New Consultation       Patient Name:  Ángel Stauffer    REASONFOR ADMISSION/CC: Fevers    PCP: Eddie Calvillo DO    HISTORY OF PRESENT ILLNESS:   39 y.o. year old male with significant past medical history of morbid obesity that presents to [facility] for shortness of breath.  Patient was having fevers and dizziness prior to admission.  He went to his primary care and received medications and was told to come back if continued fevers.  Patient was also having back pains.  He denied any shortness of breath, cough, nausea, vomiting, abdominal pain.  He was found to have pneumonia.    Patient was admitted at [facility] and had a thoracentesis with 200 cc of pus drained.  He then had a chest tube placed with 100 cc of blood and pus.  He was given tPA DNase 3 times.  He was also on antibiotic therapy for empyema.  Patient was transferred to [facility] for cardiothoracic evaluation.    Patient is never smoker, no illicit drug use, occasional alcohol use.  He denies any occupational or household exposures.    Past Medical History:   Diagnosis Date    COPD (chronic obstructive pulmonary disease) (HCC)     Elevated LFTs     GERD (gastroesophageal reflux disease)     Gout     HLD (hyperlipidemia)     HTN (hypertension)     CHIDI (obstructive sleep apnea)     Vitamin D deficiency        Past Surgical History:   Procedure Laterality Date    CHOLECYSTECTOMY      CT GUIDED CHEST TUBE  6/18/2024    CT GUIDED CHEST TUBE 6/18/2024 [facility] CT SCAN    OTHER SURGICAL HISTORY Left 2022    tendon repair in hand       family history includes Cancer in his mother; Diabetes in his maternal grandmother; High Blood Pressure in his father and paternal grandfather.    Social History     Tobacco Use    Smoking status: Never    Smokeless tobacco: Current     Types: Chew   Substance Use Topics    Alcohol use: No        Meds:    Current Facility-Administered Medications:     budesonide (PULMICORT) nebulizer suspension 500 mcg,  Negative for color change.   Neurological:  Negative for dizziness, weakness and light-headedness.   Psychiatric/Behavioral:  Negative for agitation and behavioral problems.        I personally reviewed the patient's medication list, medical and surgical history, and updated as needed.     Objective:   EXAM:  BP (!) 150/81   Pulse 81   Temp 99.2 °F (37.3 °C) (Oral)   Resp 20   SpO2 93%      Physical Exam  Constitutional:       Appearance: He is obese. He is ill-appearing.   HENT:      Head: Normocephalic and atraumatic.      Nose: Nose normal.      Mouth/Throat:      Pharynx: No oropharyngeal exudate.   Eyes:      General: No scleral icterus.        Right eye: No discharge.         Left eye: No discharge.   Cardiovascular:      Rate and Rhythm: Normal rate.      Heart sounds: No murmur heard.     No gallop.   Pulmonary:      Effort: Pulmonary effort is normal.      Breath sounds: Rales (right base) present. No wheezing.   Abdominal:      General: Abdomen is flat. Bowel sounds are normal. There is no distension.      Tenderness: There is no abdominal tenderness.   Musculoskeletal:         General: Swelling present.      Cervical back: Normal range of motion.   Skin:     General: Skin is warm and dry.   Neurological:      Mental Status: He is alert and oriented to person, place, and time.            Data Reviewed:   LABS:  :   Recent Labs     06/19/24  0517 06/20/24  0457   WBC 8.4 9.1   HGB 10.9* 12.1*   HCT 32.8* 35.9*   MCV 91.0 90.2    370     BMP:   Recent Labs     06/19/24  0517 06/20/24  0457 06/21/24  0521    134* 133*   K 4.0 4.2 4.1    100 97*   CO2 24 23 27   BUN 15 14 14   CREATININE 0.8* 0.7* 1.0     PROFILE: No results for input(s): \"AST\", \"ALT\", \"LIPASE\", \"AMYLASE\", \"BILIDIR\", \"BILITOT\", \"ALKPHOS\" in the last 72 hours.    Invalid input(s): \"ALB\"  PT/INR: No results for input(s): \"PROTIME\", \"INR\" in the last 72 hours.  APTT:No results for input(s): \"APTT\" in the last 72 hours.  :No

## 2024-06-21 NOTE — CONSULTS
premix, 2,000 mg, IntraVENous, PRN  senna (SENOKOT) tablet 8.6 mg, 1 tablet, Oral, Daily PRN      No Known Allergies       REVIEW OF SYSTEMS:    CONSTITUTIONAL:   per HPI   EYES:  negative for blurred vision, eye discharge, visual disturbance and icterus  HEENT:  negative for hearing loss, tinnitus, ear drainage, sinus pressure, nasal congestion, epistaxis and snoring  RESPIRATORY:   no hemoptysis  CARDIOVASCULAR:  negative for palpitations, exertional chest pressure/discomfort, edema, syncope  GASTROINTESTINAL:  negative for nausea, vomiting, diarrhea  GENITOURINARY:  negative for frequency, dysuria, urinary incontinence, decreased urine volume, and hematuria  HEMATOLOGIC/LYMPHATIC:  negative for easy bruising, bleeding and lymphadenopathy  ALLERGIC/IMMUNOLOGIC:  negative for recurrent infections, angioedema, anaphylaxis and drug reactions  ENDOCRINE:  negative for weight changes and diabetic symptoms including polyuria, polydipsia and polyphagia  MUSCULOSKELETAL:  negative for acute joint swelling, decreased range of motion and muscle weakness  NEUROLOGICAL:  negative for headaches, slurred speech, unilateral weakness  PSYCHIATRIC/BEHAVIORAL: negative for hallucinations, behavioral problems, confusion and agitation.       Objective:   PHYSICAL EXAM:      VITALS:  BP (!) 141/77   Pulse 83   Temp 98.4 °F (36.9 °C) (Oral)   Resp 20   SpO2 93%      24HR INTAKE/OUTPUT:    Intake/Output Summary (Last 24 hours) at 6/21/2024 1144  Last data filed at 6/21/2024 0657  Gross per 24 hour   Intake 850 ml   Output 1000 ml   Net -150 ml     CONSTITUTIONAL:  Awake, alert, cooperative, no apparent distress  Appears stated age  Obese   HEENT: NCAT, PERRL, EOMI.  Sclera white, conjunctiva full.  OP with moist mucosal membranes, no thrush, tongue protrudes midline  NECK:  Supple, symmetrical, trachea midline, no adenopathy  LUNGS:  no increased work of breathing  Decreased breath sounds and chest tube present R lung    CARDIOVASCULAR:  RRR without murmur  ABDOMEN:  normal bowel sounds, soft, flat, NT   PSYCHIATRIC: Oriented to person place and time. No obvious depression or anxiety.  MUSCULOSKELETAL: No obvious misalignment or effusion of the joints. No clubbing, cyanosis of the digits.  SKIN:  normal skin color, texture, turgor and no redness, warmth, or swelling. No palpable nodules or stigmata of embolic phenomenon  NEUROLOGIC: nonfocal exam    ACCESS:  PIV in place       DATA:    Old records have been reviewed    CBC:  Recent Labs     2417 247   WBC 8.4 9.1   RBC 3.61* 3.98*   HGB 10.9* 12.1*   HCT 32.8* 35.9*    370   MCV 91.0 90.2   MCH 30.3 30.3   MCHC 33.3 33.7   RDW 13.4 13.5   BANDSPCT 3 3      BMP:  Recent Labs     247 24  0521    134* 133*   K 4.0 4.2 4.1    100 97*   CO2 24 23 27   BUN 15 14 14   CREATININE 0.8* 0.7* 1.0   CALCIUM 8.8 9.1 8.5   GLUCOSE 100* 82 95      Sed Rate, Automated   Date Value Ref Range Status   2024 56 (H) 0 - 15 mm/Hr Final     CRP   Date Value Ref Range Status   2024 256.8 (H) 0.0 - 5.1 mg/L Final     Comment:     WR-CRP Reference range:  30D-199Y    <5.1  <30D        Not established    CRP is used in the detection and evaluation of infection,  tissue injury, inflammatory disorders, and associated  disease.  Increases in CRP values are non-specific and  should not be interpreted without a complete clinical  evaluation.   reference ranges have not been  established and values should be interpreted within clinical  context and with serial measurements, if clinically  appropriate.           Cultures:    BC x2 neg    MRSA screen neg  Expectorated sputum culture neg  legionella, pneumococcal ag neg    Pna PCR panel rhinovirus/enterovirus    Pleural fluid culture neg; GS with GPC/GPR        Radiology Review:  All pertinent images / reports were reviewed as a part of this visit.     CT chest

## 2024-06-21 NOTE — CARE COORDINATION
Case Management Assessment  Initial Evaluation    Date/Time of Evaluation: 6/21/2024 9:56 AM  Assessment Completed by: Steph Starr RN    If patient is discharged prior to next notation, then this note serves as note for discharge by case management.    Patient Name: Ángel Stauffer                   YOB: 1985  Diagnosis: Empyema lung (HCC) [J86.9]                   Date / Time: 6/20/2024  3:46 PM    Patient Admission Status: Inpatient   Readmission Risk (Low < 19, Mod (19-27), High > 27): Readmission Risk Score: 9    Current PCP: Eddie Calvillo DO  PCP verified by CM? Yes (Eddie Calvillo DO)    Chart Reviewed: Yes      History Provided by: Patient  Patient Orientation: Alert and Oriented    Patient Cognition: Alert    Hospitalization in the last 30 days (Readmission):  Yes    If yes, Readmission Assessment in CM Navigator will be completed.    Advance Directives:      Code Status: Full Code   Patient's Primary Decision Maker is: Patient Declined (Legal Next of Kin Remains as Decision Maker)    Primary Decision Maker: Radha Stauffer - Spouse - [phone]    Discharge Planning:    Patient lives with: Spouse/Significant Other Type of Home: House  Primary Care Giver: Self  Patient Support Systems include: Spouse/Significant Other   Current Financial resources: Other (Comment) (BCBS)  Current community resources: None  Current services prior to admission: None            Current DME:              Type of Home Care services:  None    ADLS  Prior functional level: Independent in ADLs/IADLs  Current functional level: Independent in ADLs/IADLs    PT AM-PAC:   /24  OT AM-PAC:   /24    Family can provide assistance at DC: Yes  Would you like Case Management to discuss the discharge plan with any other family members/significant others, and if so, who? No  Plans to Return to Present Housing: Yes  Other Identified Issues/Barriers to RETURNING to current housing: None  Potential Assistance needed at

## 2024-06-22 LAB
ANION GAP SERPL CALCULATED.3IONS-SCNC: 8 MMOL/L (ref 3–16)
BUN SERPL-MCNC: 14 MG/DL (ref 7–20)
CALCIUM SERPL-MCNC: 8.5 MG/DL (ref 8.3–10.6)
CHLORIDE SERPL-SCNC: 99 MMOL/L (ref 99–110)
CO2 SERPL-SCNC: 28 MMOL/L (ref 21–32)
CREAT SERPL-MCNC: 0.9 MG/DL (ref 0.9–1.3)
DEPRECATED RDW RBC AUTO: 13.9 % (ref 12.4–15.4)
GFR SERPLBLD CREATININE-BSD FMLA CKD-EPI: >90 ML/MIN/{1.73_M2}
GLUCOSE SERPL-MCNC: 92 MG/DL (ref 70–99)
HCT VFR BLD AUTO: 33.7 % (ref 40.5–52.5)
HGB BLD-MCNC: 11.2 G/DL (ref 13.5–17.5)
MCH RBC QN AUTO: 29.6 PG (ref 26–34)
MCHC RBC AUTO-ENTMCNC: 33.1 G/DL (ref 31–36)
MCV RBC AUTO: 89.5 FL (ref 80–100)
PLATELET # BLD AUTO: 335 K/UL (ref 135–450)
PMV BLD AUTO: 7.7 FL (ref 5–10.5)
POTASSIUM SERPL-SCNC: 4 MMOL/L (ref 3.5–5.1)
RBC # BLD AUTO: 3.76 M/UL (ref 4.2–5.9)
REASON FOR REJECTION: NORMAL
REJECTED TEST: NORMAL
SODIUM SERPL-SCNC: 135 MMOL/L (ref 136–145)
VANCOMYCIN SERPL-MCNC: <4 UG/ML
WBC # BLD AUTO: 10 K/UL (ref 4–11)

## 2024-06-22 PROCEDURE — 2060000000 HC ICU INTERMEDIATE R&B

## 2024-06-22 PROCEDURE — 80048 BASIC METABOLIC PNL TOTAL CA: CPT

## 2024-06-22 PROCEDURE — 2580000003 HC RX 258: Performed by: INTERNAL MEDICINE

## 2024-06-22 PROCEDURE — 6360000002 HC RX W HCPCS: Performed by: INTERNAL MEDICINE

## 2024-06-22 PROCEDURE — 99233 SBSQ HOSP IP/OBS HIGH 50: CPT | Performed by: STUDENT IN AN ORGANIZED HEALTH CARE EDUCATION/TRAINING PROGRAM

## 2024-06-22 PROCEDURE — 94640 AIRWAY INHALATION TREATMENT: CPT

## 2024-06-22 PROCEDURE — 99232 SBSQ HOSP IP/OBS MODERATE 35: CPT

## 2024-06-22 PROCEDURE — 85027 COMPLETE CBC AUTOMATED: CPT

## 2024-06-22 PROCEDURE — 36415 COLL VENOUS BLD VENIPUNCTURE: CPT

## 2024-06-22 PROCEDURE — 6370000000 HC RX 637 (ALT 250 FOR IP): Performed by: INTERNAL MEDICINE

## 2024-06-22 PROCEDURE — 80202 ASSAY OF VANCOMYCIN: CPT

## 2024-06-22 RX ADMIN — SODIUM CHLORIDE, PRESERVATIVE FREE 10 ML: 5 INJECTION INTRAVENOUS at 21:20

## 2024-06-22 RX ADMIN — METHOCARBAMOL 750 MG: 500 TABLET ORAL at 21:20

## 2024-06-22 RX ADMIN — SODIUM CHLORIDE, PRESERVATIVE FREE 10 ML: 5 INJECTION INTRAVENOUS at 21:19

## 2024-06-22 RX ADMIN — PIPERACILLIN AND TAZOBACTAM 3375 MG: 3; .375 INJECTION, POWDER, LYOPHILIZED, FOR SOLUTION INTRAVENOUS at 15:11

## 2024-06-22 RX ADMIN — LOSARTAN POTASSIUM 100 MG: 100 TABLET, FILM COATED ORAL at 07:49

## 2024-06-22 RX ADMIN — Medication 1 CAPSULE: at 07:50

## 2024-06-22 RX ADMIN — PIPERACILLIN AND TAZOBACTAM 3375 MG: 3; .375 INJECTION, POWDER, LYOPHILIZED, FOR SOLUTION INTRAVENOUS at 23:39

## 2024-06-22 RX ADMIN — PIPERACILLIN AND TAZOBACTAM 3375 MG: 3; .375 INJECTION, POWDER, LYOPHILIZED, FOR SOLUTION INTRAVENOUS at 06:05

## 2024-06-22 RX ADMIN — BUDESONIDE 500 MCG: 0.5 SUSPENSION RESPIRATORY (INHALATION) at 20:28

## 2024-06-22 RX ADMIN — METHOCARBAMOL 750 MG: 500 TABLET ORAL at 07:50

## 2024-06-22 RX ADMIN — IPRATROPIUM BROMIDE AND ALBUTEROL SULFATE 1 DOSE: 2.5; .5 SOLUTION RESPIRATORY (INHALATION) at 08:31

## 2024-06-22 RX ADMIN — ENOXAPARIN SODIUM 40 MG: 100 INJECTION SUBCUTANEOUS at 07:50

## 2024-06-22 RX ADMIN — METHOCARBAMOL 750 MG: 500 TABLET ORAL at 13:06

## 2024-06-22 RX ADMIN — IPRATROPIUM BROMIDE AND ALBUTEROL SULFATE 1 DOSE: 2.5; .5 SOLUTION RESPIRATORY (INHALATION) at 20:28

## 2024-06-22 RX ADMIN — ENOXAPARIN SODIUM 40 MG: 100 INJECTION SUBCUTANEOUS at 21:20

## 2024-06-22 RX ADMIN — BUDESONIDE 500 MCG: 0.5 SUSPENSION RESPIRATORY (INHALATION) at 08:31

## 2024-06-22 NOTE — PROGRESS NOTES
Patient: Ángel Stauffer MRN: [id number]  Date of  Admission: 6/20/2024   YOB: 1985  Age: 39 y.o.  Sex: male    [address]: [address]  Room/Bed: [facility]9/0439-01 Admitting Physician:     Attending Physician:  Khoa Reid MD         Pulmonary Service Note      SUBJECTIVE:    Patient has no complaints this afternoon.  He denies any fever, chills, cough, shortness of breath.          OBJECTIVE    Medications    Continuous Infusions:   sodium chloride      sodium chloride         Scheduled Meds:   piperacillin-tazobactam  3,375 mg IntraVENous Q8H    budesonide  0.5 mg Nebulization BID RT    enoxaparin  40 mg SubCUTAneous BID    ipratropium 0.5 mg-albuterol 2.5 mg  1 Dose Inhalation BID    lactobacillus  1 capsule Oral Daily with breakfast    losartan  100 mg Oral Daily    methocarbamol  750 mg Oral 4x Daily    sodium chloride flush  5-40 mL IntraVENous 2 times per day    sodium chloride flush  5-40 mL IntraVENous 2 times per day       PRN Meds:  sodium chloride, acetaminophen **OR** acetaminophen, ipratropium 0.5 mg-albuterol 2.5 mg, morphine **OR** morphine, ondansetron **OR** ondansetron, polyethylene glycol, sodium chloride flush, sodium chloride flush, sodium chloride, potassium chloride **OR** potassium alternative oral replacement **OR** potassium chloride, magnesium sulfate, senna    Physical    Patient Vitals for the past 24 hrs:   BP Temp Temp src Pulse Resp SpO2 Weight   06/22/24 1115 115/71 98.2 °F (36.8 °C) Oral 91 20 94 % --   06/22/24 0831 -- -- -- -- -- 97 % --   06/22/24 0745 122/81 97.8 °F (36.6 °C) Oral 93 20 96 % --   06/22/24 0515 107/65 99 °F (37.2 °C) Oral 84 20 93 % (!) 166.2 kg (366 lb 4.8 oz)   06/21/24 2355 -- -- -- 85 -- 95 % --   06/21/24 2350 107/69 (!) 100.8 °F (38.2 °C) Oral 83 22 93 % --   06/21/24 2230 -- -- -- -- 25 -- --   06/21/24 2037 -- -- -- 97 18 96 % --   06/21/24 2010 113/71 99 °F (37.2 °C) Oral 99 20 94 % --   06/21/24 1911 118/68 -- -- (!) 102 -- 92 % --   06/21/24

## 2024-06-22 NOTE — PROGRESS NOTES
06/21/24 2230   NIV Type   NIV Started/Stopped On   Equipment Type v60   Mode Bilevel   Mask Type Full face mask   Mask Size Large   Assessment   Respirations 25   Comfort Level Good   Using Accessory Muscles No   Mask Compliance Good   Skin Assessment Clean, dry, & intact   Skin Protection for O2 Device Yes   Orientation Middle   Location Nose   Intervention(s) Skin Barrier   Settings/Measurements   IPAP 18 cmH20   CPAP/EPAP 10 cmH2O   Vt (Measured) 878 mL   Rate Ordered 12   FiO2  21 %   Minute Volume (L/min) 21.9 Liters   Mask Leak (lpm) 7 lpm   Patient's Home Machine No   Alarm Settings   Alarms On Y   Low Pressure (cmH2O) 6 cmH2O   High Pressure (cmH2O) 30 cmH2O   Delay Alarm 20 sec(s)   RR Low (bpm) 6   RR High (bpm) 50 br/min

## 2024-06-22 NOTE — PROGRESS NOTES
BMI 48.33 kg/m²     Diet: ADULT DIET; Regular  DVT Prophylaxis: [x]PPx LMWH  []SQ Heparin  []IPC/SCDs  []Eliquis  []Xarelto  []Coumadin  []Other -      Code status: Full Code  PT/OT Eval Status:   [x]NOT yet ordered  []Ordered and Pending   []Seen with Recommendations for:  []Home independently  []Home w/ assist  []HHC  []SNF  []Acute Rehab    Anticipated Discharge Day/Date:  4-6 days    Anticipated Discharge Location: [x]Home  []HHC  []SNF  []Acute Rehab  []ECF  []LTAC  []Hospice  []Other -      Consults:      PHARMACY TO DOSE VANCOMYCIN  IP CONSULT TO CARDIOTHORACIC SURGERY  IP CONSULT TO INFECTIOUS DISEASES  IP CONSULT TO PULMONOLOGY      This patient has a high likelihood of being discharged tomorrow and is appropriate for A1 Discharge Unit in AM pending clinical course overnight: []Yes  [x]No    ------------------------------------------------------------------------------------------------------------------------------------------------------------------------    MDM      [x] High (any 2)    A. Problems (any 1)  [x] Acute/Chronic Illness/injury posing threat to life or bodily function:    [] Severe exacerbation of chronic illness:    ---------------------------------------------------------------------  B. Risk of Treatment (any 1)   [] Drugs/treatments that require intensive monitoring for toxicity include:    [] IV ABX requiring serial renal monitoring for nephrotoxicity:     [] IV Narcotic analgesia for adverse drug reaction  [] IV diuresis requiring serial monitoring for renal impairment and electrolyte derangements  [] Critical electrolyte abnormalities requiring IV replacement and close serial monitoring  [] Insulin - monitoring serial FSBS for Hypoglycemic adverse drug reaction  [] Anticoagulation requiring serial monitoring of coagulation factors  [] Other -   [] Change in code status:    [] Decision to escalate care:    [] Major surgery/procedure with associated risk factors:   27 28   BUN 14 14 14   CREATININE 0.7* 1.0 0.9   CALCIUM 9.1 8.5 8.5     No results for input(s): \"PROBNP\", \"TROPHS\" in the last 72 hours.  No results for input(s): \"LABA1C\" in the last 72 hours.  No results for input(s): \"AST\", \"ALT\", \"BILIDIR\", \"BILITOT\", \"ALKPHOS\" in the last 72 hours.  No results for input(s): \"INR\", \"LACTA\", \"TSH\" in the last 72 hours.    Urine Cultures: No results found for: \"LABURIN\"  Blood Cultures:   Lab Results   Component Value Date/Time    BC No Growth after 4 days of incubation. 06/13/2024 09:00 AM     Lab Results   Component Value Date/Time    BLOODCULT2 No Growth after 4 days of incubation. 06/13/2024 09:15 AM     Organism:   Lab Results   Component Value Date/Time    ORG Human Rhinovirus/Enterovirus by PCR 06/14/2024 09:00 AM         Khoa Reid MD

## 2024-06-22 NOTE — PLAN OF CARE
Problem: Discharge Planning  Goal: Discharge to home or other facility with appropriate resources  6/22/2024 0850 by Machelle Infante RN  Outcome: Progressing  6/22/2024 0238 by Liborio Smith RN  Outcome: Progressing     Problem: Pain  Goal: Verbalizes/displays adequate comfort level or baseline comfort level  6/22/2024 0850 by Machelle Infante RN  Outcome: Progressing  6/22/2024 0238 by Liborio Smith RN  Outcome: Progressing

## 2024-06-22 NOTE — PROGRESS NOTES
CVTS Cardiac Progress Note:          CC:  R Empyema    Subj: No acute event overnight. Awaiting surgery on Monday.    HISTORY OF PRESENT ILLNESS:    The patient is a 39 y.o. male with significant past medical history of COPD, GERD, CHIDI not on BIPAP, HLD, HTN, & gout who presents as a transfer from [facility], where patient was admitted on 6/13 for parapneumonic effusion after having failed Levaquin and doxycycline as an outpatient. During his time at [facility], he underwent thoracentesis on 6/17, where 200 cc of purulent loculated fluid was aspirated.  He then underwent right-sided chest tube placement on 6/18 and had tPA dornase X.3 administered through the chest tube.  Last dose given 6/20 total of 200 cc purulent fluid was drained.  CXR and CT chest obtained here at [facility] on 6/21 show a decrease in the right-sided pleural effusion, however there is concern for empyema. The cardiothoracic surgery team was consulted for consideration of VATS and decortication procedure.       Obj:    Blood pressure 115/71, pulse 91, temperature 98.2 °F (36.8 °C), temperature source Oral, resp. rate 20, weight (!) 166.2 kg (366 lb 4.8 oz), SpO2 94 %.    Alert, oriented   S1 S2 normal. SR on monitor  Lungs Diminished R>L   Abdomen obese, soft. normoactive bowel sounds  R chest tube CDI    ml/ last 24 hr; Cr 0.9   Chest tube with 0-40-0=40 ml of purulent drainage in last 24 hours/no airleak    Diagnostics:   CBC with Differential:    Lab Results   Component Value Date/Time    WBC 10.0 06/22/2024 08:32 AM    RBC 3.76 06/22/2024 08:32 AM    HGB 11.2 06/22/2024 08:32 AM    HCT 33.7 06/22/2024 08:32 AM     06/22/2024 08:32 AM    MCV 89.5 06/22/2024 08:32 AM    MCH 29.6 06/22/2024 08:32 AM    MCHC 33.1 06/22/2024 08:32 AM    RDW 13.9 06/22/2024 08:32 AM    BANDSPCT 3 06/20/2024 04:57 AM    METASPCT 1 06/20/2024 04:57 AM    LYMPHOPCT 16.0 06/20/2024 04:57 AM    MONOPCT 9.0 06/20/2024 04:57 AM    MYELOPCT 1

## 2024-06-23 ENCOUNTER — APPOINTMENT (OUTPATIENT)
Dept: GENERAL RADIOLOGY | Age: 39
End: 2024-06-23
Attending: INTERNAL MEDICINE
Payer: COMMERCIAL

## 2024-06-23 LAB
ABO + RH BLD: NORMAL
ANTIBODY SCREEN: NORMAL
DEPRECATED RDW RBC AUTO: 13.5 % (ref 12.4–15.4)
HCT VFR BLD AUTO: 32.1 % (ref 40.5–52.5)
HGB BLD-MCNC: 10.8 G/DL (ref 13.5–17.5)
MCH RBC QN AUTO: 30.1 PG (ref 26–34)
MCHC RBC AUTO-ENTMCNC: 33.6 G/DL (ref 31–36)
MCV RBC AUTO: 89.6 FL (ref 80–100)
PLATELET # BLD AUTO: 284 K/UL (ref 135–450)
PMV BLD AUTO: 7.4 FL (ref 5–10.5)
RBC # BLD AUTO: 3.58 M/UL (ref 4.2–5.9)
WBC # BLD AUTO: 7.6 K/UL (ref 4–11)

## 2024-06-23 PROCEDURE — 6360000002 HC RX W HCPCS: Performed by: INTERNAL MEDICINE

## 2024-06-23 PROCEDURE — 86900 BLOOD TYPING SEROLOGIC ABO: CPT

## 2024-06-23 PROCEDURE — 6370000000 HC RX 637 (ALT 250 FOR IP): Performed by: INTERNAL MEDICINE

## 2024-06-23 PROCEDURE — 30233N1 TRANSFUSION OF NONAUTOLOGOUS RED BLOOD CELLS INTO PERIPHERAL VEIN, PERCUTANEOUS APPROACH: ICD-10-PCS | Performed by: STUDENT IN AN ORGANIZED HEALTH CARE EDUCATION/TRAINING PROGRAM

## 2024-06-23 PROCEDURE — 36415 COLL VENOUS BLD VENIPUNCTURE: CPT

## 2024-06-23 PROCEDURE — 86850 RBC ANTIBODY SCREEN: CPT

## 2024-06-23 PROCEDURE — P9016 RBC LEUKOCYTES REDUCED: HCPCS

## 2024-06-23 PROCEDURE — 71045 X-RAY EXAM CHEST 1 VIEW: CPT

## 2024-06-23 PROCEDURE — 94640 AIRWAY INHALATION TREATMENT: CPT

## 2024-06-23 PROCEDURE — 85027 COMPLETE CBC AUTOMATED: CPT

## 2024-06-23 PROCEDURE — 2060000000 HC ICU INTERMEDIATE R&B

## 2024-06-23 PROCEDURE — 86923 COMPATIBILITY TEST ELECTRIC: CPT

## 2024-06-23 PROCEDURE — 2580000003 HC RX 258: Performed by: INTERNAL MEDICINE

## 2024-06-23 PROCEDURE — 99232 SBSQ HOSP IP/OBS MODERATE 35: CPT

## 2024-06-23 PROCEDURE — 86901 BLOOD TYPING SEROLOGIC RH(D): CPT

## 2024-06-23 RX ORDER — ACETAMINOPHEN 500 MG
1000 TABLET ORAL ONCE
Status: DISCONTINUED | OUTPATIENT
Start: 2024-06-24 | End: 2024-06-24

## 2024-06-23 RX ORDER — SODIUM CHLORIDE 9 MG/ML
INJECTION, SOLUTION INTRAVENOUS PRN
Status: DISCONTINUED | OUTPATIENT
Start: 2024-06-23 | End: 2024-06-24

## 2024-06-23 RX ADMIN — LOSARTAN POTASSIUM 100 MG: 100 TABLET, FILM COATED ORAL at 08:05

## 2024-06-23 RX ADMIN — BUDESONIDE 500 MCG: 0.5 SUSPENSION RESPIRATORY (INHALATION) at 08:28

## 2024-06-23 RX ADMIN — METHOCARBAMOL 750 MG: 500 TABLET ORAL at 08:06

## 2024-06-23 RX ADMIN — BUDESONIDE 500 MCG: 0.5 SUSPENSION RESPIRATORY (INHALATION) at 20:41

## 2024-06-23 RX ADMIN — METHOCARBAMOL 750 MG: 500 TABLET ORAL at 18:13

## 2024-06-23 RX ADMIN — PIPERACILLIN AND TAZOBACTAM 3375 MG: 3; .375 INJECTION, POWDER, LYOPHILIZED, FOR SOLUTION INTRAVENOUS at 15:37

## 2024-06-23 RX ADMIN — METHOCARBAMOL 750 MG: 500 TABLET ORAL at 13:28

## 2024-06-23 RX ADMIN — IPRATROPIUM BROMIDE AND ALBUTEROL SULFATE 1 DOSE: 2.5; .5 SOLUTION RESPIRATORY (INHALATION) at 20:38

## 2024-06-23 RX ADMIN — SODIUM CHLORIDE, PRESERVATIVE FREE 10 ML: 5 INJECTION INTRAVENOUS at 20:19

## 2024-06-23 RX ADMIN — SODIUM CHLORIDE, PRESERVATIVE FREE 10 ML: 5 INJECTION INTRAVENOUS at 20:22

## 2024-06-23 RX ADMIN — ENOXAPARIN SODIUM 40 MG: 100 INJECTION SUBCUTANEOUS at 20:19

## 2024-06-23 RX ADMIN — PIPERACILLIN AND TAZOBACTAM 3375 MG: 3; .375 INJECTION, POWDER, LYOPHILIZED, FOR SOLUTION INTRAVENOUS at 06:12

## 2024-06-23 RX ADMIN — Medication 1 CAPSULE: at 08:05

## 2024-06-23 RX ADMIN — ENOXAPARIN SODIUM 40 MG: 100 INJECTION SUBCUTANEOUS at 08:06

## 2024-06-23 RX ADMIN — IPRATROPIUM BROMIDE AND ALBUTEROL SULFATE 1 DOSE: 2.5; .5 SOLUTION RESPIRATORY (INHALATION) at 08:28

## 2024-06-23 RX ADMIN — METHOCARBAMOL 750 MG: 500 TABLET ORAL at 20:19

## 2024-06-23 NOTE — PROGRESS NOTES
06/22/24 2322   NIV Type   Equipment Type V60   Mode Bilevel   Mask Type Full face mask   Mask Size Large   Assessment   Pulse 85   Respirations 18   SpO2 95 %   Comfort Level Good   Using Accessory Muscles No   Mask Compliance Good   Skin Assessment Clean, dry, & intact   Settings/Measurements   PIP Observed 18 cm H20   IPAP 18 cmH20   CPAP/EPAP 10 cmH2O   Vt (Measured) 741 mL   FiO2  21 %   Minute Volume (L/min) 17 Liters   Mask Leak (lpm) 0 lpm   Patient's Home Machine No   Alarm Settings   Alarms On Y

## 2024-06-23 NOTE — PROGRESS NOTES
Hospital Medicine Progress Note      Date of Admission: 6/20/2024  Hospital Day: 4    Chief Admission Complaint:  empyema     Subjective:  chest pain well controlled    Presenting Admission History:       Ángel Stauffer is a 39 y.o. male with pmh of essential hypertension, who was initially admitted to [facility] on 6/13/2024, with persistent fever up to 102 °F, headache, cough, dizziness; was reportedly discharged 3 weeks prior on levofloxacin and azithromycin for pneumonia; on this admission initially found to have small pleural effusion.  Pleural effusion continued to appear to worsen on serial x-rays and was evaluated with bedside ultrasound by pulmonology team-found to have moderate loculated fluid.  CT-guided chest tube placed on 6/18/2024 by IR team -immediate return of tan purulent fluid.  MIST protocol started by pulmonology team with intrapleural instillation of tPA/Pulmozyme-patient received 3 doses 12 hours apart, last instillation on 6/20 at 7am.  Patient was transferred to [facility] for evaluation for VATS with cardiothoracic surgery.     Assessment/Plan:      Current Principal Problem:  Empyema lung (HCC)    Empyema, pneumonia  - CT surgery consulted  - ID consulted  - chest tube in place  - continue zosyn  - plan for VATS on Monday    Rhinovirus  - supportive care    COPD  - no evidence of exacerbation  - continue inhalers    HTN  - well controlled  - continue losartan    Anemia  - chronic, stable  - continue to monitor    Gout  - s/p prednisone    CHIDI  - CPAP ordered    Physical Exam Performed:      General appearance:  No apparent distress  Respiratory:  Normal respiratory effort. Chest tube in place  Cardiovascular:  Regular rate and rhythm.  Abdomen:  Soft, non-tender, non-distended.  Musculoskeletal:  No edema  Neurologic:  Non-focal  Psychiatric:  Alert and oriented    /74   Pulse 78   Temp 98.4 °F (36.9 °C)   Resp 18   Wt (!) 166.2 kg (366 lb 8 oz)   SpO2 97%   BMI 48.35    ----------------------------------------------------------------------  C. Data (any 2)  [x] Discussed current management and discharge planning options with Case Management.  [x] Discussed management of the case with: ID  [] Telemetry personally reviewed and interpreted as documented above    [] Imaging personally reviewed and interpreted, includes:    [x] Data Review (any 3)  [x] All available Consultant notes from yesterday/today were reviewed  [x] All current labs were reviewed and interpreted for clinical significance   [x] Appropriate follow-up labs were ordered  [] Collateral history obtained from:        Medications:  Personally reviewed in detail in conjunction w/ labs as documented for evidence of drug toxicity.     Infusion Medications    sodium chloride      sodium chloride      sodium chloride       Scheduled Medications    [START ON 6/24/2024] acetaminophen  1,000 mg Oral Once    [START ON 6/24/2024] vancomycin  2,000 mg IntraVENous On Call to OR    piperacillin-tazobactam  3,375 mg IntraVENous Q8H    budesonide  0.5 mg Nebulization BID RT    enoxaparin  40 mg SubCUTAneous BID    ipratropium 0.5 mg-albuterol 2.5 mg  1 Dose Inhalation BID    lactobacillus  1 capsule Oral Daily with breakfast    losartan  100 mg Oral Daily    methocarbamol  750 mg Oral 4x Daily    sodium chloride flush  5-40 mL IntraVENous 2 times per day    sodium chloride flush  5-40 mL IntraVENous 2 times per day     PRN Meds: sodium chloride, sodium chloride, acetaminophen **OR** acetaminophen, ipratropium 0.5 mg-albuterol 2.5 mg, morphine **OR** morphine, ondansetron **OR** ondansetron, polyethylene glycol, sodium chloride flush, sodium chloride flush, sodium chloride, potassium chloride **OR** potassium alternative oral replacement **OR** potassium chloride, magnesium sulfate, senna     Labs:  Personally reviewed and interpreted for clinical significance.     Recent Labs     06/22/24  0832 06/23/24  0536   WBC 10.0 7.6   HGB

## 2024-06-23 NOTE — PROGRESS NOTES
CVTS Cardiac Progress Note:          CC:  R Empyema    Subj: No acute event overnight. Awaiting surgery tomorrow.    HISTORY OF PRESENT ILLNESS:    The patient is a 39 y.o. male with significant past medical history of COPD, GERD, CHIDI not on BIPAP, HLD, HTN, & gout who presents as a transfer from [facility], where patient was admitted on 6/13 for parapneumonic effusion after having failed Levaquin and doxycycline as an outpatient. During his time at [facility], he underwent thoracentesis on 6/17, where 200 cc of purulent loculated fluid was aspirated.  He then underwent right-sided chest tube placement on 6/18 and had tPA dornase X.3 administered through the chest tube.  Last dose given 6/20 total of 200 cc purulent fluid was drained.  CXR and CT chest obtained here at [facility] on 6/21 show a decrease in the right-sided pleural effusion, however there is concern for empyema. The cardiothoracic surgery team was consulted for consideration of VATS and decortication procedure.       Obj:    Blood pressure 120/74, pulse 78, temperature 98.4 °F (36.9 °C), resp. rate 18, weight (!) 166.2 kg (366 lb 8 oz), SpO2 97 %.    Alert, oriented   S1 S2 normal. SR on monitor  Lungs Diminished R>L   Abdomen obese, soft. normoactive bowel sounds  R chest tube CDI   UOP 1500 ml/ last 24 hr; Cr 0.9   Chest tube with 100 ml of purulent drainage in last 24 hours/no airleak    Diagnostics:   CBC with Differential:    Lab Results   Component Value Date/Time    WBC 7.6 06/23/2024 05:36 AM    RBC 3.58 06/23/2024 05:36 AM    HGB 10.8 06/23/2024 05:36 AM    HCT 32.1 06/23/2024 05:36 AM     06/23/2024 05:36 AM    MCV 89.6 06/23/2024 05:36 AM    MCH 30.1 06/23/2024 05:36 AM    MCHC 33.6 06/23/2024 05:36 AM    RDW 13.5 06/23/2024 05:36 AM    BANDSPCT 3 06/20/2024 04:57 AM    METASPCT 1 06/20/2024 04:57 AM    LYMPHOPCT 16.0 06/20/2024 04:57 AM    MONOPCT 9.0 06/20/2024 04:57 AM    MYELOPCT 1 06/20/2024 04:57 AM    EOSPCT 1.0

## 2024-06-24 ENCOUNTER — APPOINTMENT (OUTPATIENT)
Dept: GENERAL RADIOLOGY | Age: 39
End: 2024-06-24
Attending: INTERNAL MEDICINE
Payer: COMMERCIAL

## 2024-06-24 ENCOUNTER — ANESTHESIA (OUTPATIENT)
Dept: OPERATING ROOM | Age: 39
End: 2024-06-24
Payer: COMMERCIAL

## 2024-06-24 LAB
ANION GAP SERPL CALCULATED.3IONS-SCNC: 9 MMOL/L (ref 3–16)
BASE EXCESS BLDA CALC-SCNC: 4 MMOL/L (ref -3–3)
BLOOD BANK DISPENSE STATUS: NORMAL
BLOOD BANK DISPENSE STATUS: NORMAL
BLOOD BANK PRODUCT CODE: NORMAL
BLOOD BANK PRODUCT CODE: NORMAL
BPU ID: NORMAL
BPU ID: NORMAL
BUN SERPL-MCNC: 11 MG/DL (ref 7–20)
CA-I BLD-SCNC: 1.22 MMOL/L (ref 1.12–1.32)
CALCIUM SERPL-MCNC: 8.5 MG/DL (ref 8.3–10.6)
CHLORIDE SERPL-SCNC: 101 MMOL/L (ref 99–110)
CO2 BLDA-SCNC: 31 MMOL/L
CO2 SERPL-SCNC: 27 MMOL/L (ref 21–32)
CREAT SERPL-MCNC: 1.1 MG/DL (ref 0.9–1.3)
DEPRECATED RDW RBC AUTO: 13.9 % (ref 12.4–15.4)
DEPRECATED RDW RBC AUTO: 14 % (ref 12.4–15.4)
DESCRIPTION BLOOD BANK: NORMAL
DESCRIPTION BLOOD BANK: NORMAL
GFR SERPLBLD CREATININE-BSD FMLA CKD-EPI: 87 ML/MIN/{1.73_M2}
GLUCOSE BLD-MCNC: 141 MG/DL (ref 70–99)
GLUCOSE SERPL-MCNC: 135 MG/DL (ref 70–99)
HCO3 BLDA-SCNC: 29.8 MMOL/L (ref 21–29)
HCT VFR BLD AUTO: 31.7 % (ref 40.5–52.5)
HCT VFR BLD AUTO: 37.8 % (ref 40.5–52.5)
HCT VFR BLD AUTO: 40 % (ref 40.5–52.5)
HGB BLD CALC-MCNC: 13.8 GM/DL (ref 13.5–17.5)
HGB BLD-MCNC: 10.8 G/DL (ref 13.5–17.5)
HGB BLD-MCNC: 12.2 G/DL (ref 13.5–17.5)
LACTATE BLD-SCNC: 0.63 MMOL/L (ref 0.4–2)
MCH RBC QN AUTO: 29 PG (ref 26–34)
MCH RBC QN AUTO: 30.4 PG (ref 26–34)
MCHC RBC AUTO-ENTMCNC: 32.4 G/DL (ref 31–36)
MCHC RBC AUTO-ENTMCNC: 34.2 G/DL (ref 31–36)
MCV RBC AUTO: 88.9 FL (ref 80–100)
MCV RBC AUTO: 89.7 FL (ref 80–100)
PCO2 BLDA: 53.8 MM HG (ref 35–45)
PERFORMED ON: ABNORMAL
PH BLDA: 7.35 [PH] (ref 7.35–7.45)
PLATELET # BLD AUTO: 307 K/UL (ref 135–450)
PLATELET # BLD AUTO: 352 K/UL (ref 135–450)
PMV BLD AUTO: 7 FL (ref 5–10.5)
PMV BLD AUTO: 7.1 FL (ref 5–10.5)
PO2 BLDA: 74.6 MM HG (ref 75–108)
POC SAMPLE TYPE: ABNORMAL
POTASSIUM BLD-SCNC: 4.8 MMOL/L (ref 3.5–5.1)
POTASSIUM SERPL-SCNC: 4.7 MMOL/L (ref 3.5–5.1)
RBC # BLD AUTO: 3.56 M/UL (ref 4.2–5.9)
RBC # BLD AUTO: 4.22 M/UL (ref 4.2–5.9)
SAO2 % BLDA: 94 % (ref 93–100)
SODIUM BLD-SCNC: 142 MMOL/L (ref 136–145)
SODIUM SERPL-SCNC: 137 MMOL/L (ref 136–145)
SPECIMEN SOURCE FLD: NORMAL
TRIGL FLD-MCNC: 62 MG/DL
TRIGL SERPL-MCNC: 156 MG/DL (ref 0–150)
WBC # BLD AUTO: 16.4 K/UL (ref 4–11)
WBC # BLD AUTO: 7.1 K/UL (ref 4–11)

## 2024-06-24 PROCEDURE — 85027 COMPLETE CBC AUTOMATED: CPT

## 2024-06-24 PROCEDURE — 88305 TISSUE EXAM BY PATHOLOGIST: CPT

## 2024-06-24 PROCEDURE — A4217 STERILE WATER/SALINE, 500 ML: HCPCS | Performed by: STUDENT IN AN ORGANIZED HEALTH CARE EDUCATION/TRAINING PROGRAM

## 2024-06-24 PROCEDURE — 0BND0ZZ RELEASE RIGHT MIDDLE LUNG LOBE, OPEN APPROACH: ICD-10-PCS | Performed by: STUDENT IN AN ORGANIZED HEALTH CARE EDUCATION/TRAINING PROGRAM

## 2024-06-24 PROCEDURE — 6360000002 HC RX W HCPCS

## 2024-06-24 PROCEDURE — 85014 HEMATOCRIT: CPT

## 2024-06-24 PROCEDURE — 2709999900 HC NON-CHARGEABLE SUPPLY: Performed by: STUDENT IN AN ORGANIZED HEALTH CARE EDUCATION/TRAINING PROGRAM

## 2024-06-24 PROCEDURE — 71045 X-RAY EXAM CHEST 1 VIEW: CPT

## 2024-06-24 PROCEDURE — 94761 N-INVAS EAR/PLS OXIMETRY MLT: CPT

## 2024-06-24 PROCEDURE — 84478 ASSAY OF TRIGLYCERIDES: CPT

## 2024-06-24 PROCEDURE — 3600000018 HC SURGERY OHS ADDTL 15MIN: Performed by: STUDENT IN AN ORGANIZED HEALTH CARE EDUCATION/TRAINING PROGRAM

## 2024-06-24 PROCEDURE — 2000000000 HC ICU R&B

## 2024-06-24 PROCEDURE — 32220 RELEASE OF LUNG: CPT | Performed by: STUDENT IN AN ORGANIZED HEALTH CARE EDUCATION/TRAINING PROGRAM

## 2024-06-24 PROCEDURE — 2500000003 HC RX 250 WO HCPCS: Performed by: NURSE PRACTITIONER

## 2024-06-24 PROCEDURE — 6360000002 HC RX W HCPCS: Performed by: INTERNAL MEDICINE

## 2024-06-24 PROCEDURE — C1729 CATH, DRAINAGE: HCPCS | Performed by: STUDENT IN AN ORGANIZED HEALTH CARE EDUCATION/TRAINING PROGRAM

## 2024-06-24 PROCEDURE — 2500000003 HC RX 250 WO HCPCS: Performed by: NURSE ANESTHETIST, CERTIFIED REGISTERED

## 2024-06-24 PROCEDURE — 2580000003 HC RX 258: Performed by: STUDENT IN AN ORGANIZED HEALTH CARE EDUCATION/TRAINING PROGRAM

## 2024-06-24 PROCEDURE — 6360000002 HC RX W HCPCS: Performed by: NURSE ANESTHETIST, CERTIFIED REGISTERED

## 2024-06-24 PROCEDURE — 80048 BASIC METABOLIC PNL TOTAL CA: CPT

## 2024-06-24 PROCEDURE — 83605 ASSAY OF LACTIC ACID: CPT

## 2024-06-24 PROCEDURE — 6360000002 HC RX W HCPCS: Performed by: NURSE PRACTITIONER

## 2024-06-24 PROCEDURE — 2580000003 HC RX 258

## 2024-06-24 PROCEDURE — 3700000000 HC ANESTHESIA ATTENDED CARE: Performed by: STUDENT IN AN ORGANIZED HEALTH CARE EDUCATION/TRAINING PROGRAM

## 2024-06-24 PROCEDURE — 84295 ASSAY OF SERUM SODIUM: CPT

## 2024-06-24 PROCEDURE — 94002 VENT MGMT INPAT INIT DAY: CPT

## 2024-06-24 PROCEDURE — 99291 CRITICAL CARE FIRST HOUR: CPT | Performed by: INTERNAL MEDICINE

## 2024-06-24 PROCEDURE — 6370000000 HC RX 637 (ALT 250 FOR IP): Performed by: ANESTHESIOLOGY

## 2024-06-24 PROCEDURE — 82803 BLOOD GASES ANY COMBINATION: CPT

## 2024-06-24 PROCEDURE — 6370000000 HC RX 637 (ALT 250 FOR IP)

## 2024-06-24 PROCEDURE — 82947 ASSAY GLUCOSE BLOOD QUANT: CPT

## 2024-06-24 PROCEDURE — 94640 AIRWAY INHALATION TREATMENT: CPT

## 2024-06-24 PROCEDURE — 3700000001 HC ADD 15 MINUTES (ANESTHESIA): Performed by: STUDENT IN AN ORGANIZED HEALTH CARE EDUCATION/TRAINING PROGRAM

## 2024-06-24 PROCEDURE — 82330 ASSAY OF CALCIUM: CPT

## 2024-06-24 PROCEDURE — 3600000008 HC SURGERY OHS BASE: Performed by: STUDENT IN AN ORGANIZED HEALTH CARE EDUCATION/TRAINING PROGRAM

## 2024-06-24 PROCEDURE — 0BNF0ZZ RELEASE RIGHT LOWER LUNG LOBE, OPEN APPROACH: ICD-10-PCS | Performed by: STUDENT IN AN ORGANIZED HEALTH CARE EDUCATION/TRAINING PROGRAM

## 2024-06-24 PROCEDURE — C1713 ANCHOR/SCREW BN/BN,TIS/BN: HCPCS | Performed by: STUDENT IN AN ORGANIZED HEALTH CARE EDUCATION/TRAINING PROGRAM

## 2024-06-24 PROCEDURE — 5A1945Z RESPIRATORY VENTILATION, 24-96 CONSECUTIVE HOURS: ICD-10-PCS | Performed by: STUDENT IN AN ORGANIZED HEALTH CARE EDUCATION/TRAINING PROGRAM

## 2024-06-24 PROCEDURE — 84132 ASSAY OF SERUM POTASSIUM: CPT

## 2024-06-24 PROCEDURE — 32220 RELEASE OF LUNG: CPT

## 2024-06-24 PROCEDURE — 2720000010 HC SURG SUPPLY STERILE: Performed by: STUDENT IN AN ORGANIZED HEALTH CARE EDUCATION/TRAINING PROGRAM

## 2024-06-24 PROCEDURE — 2580000003 HC RX 258: Performed by: INTERNAL MEDICINE

## 2024-06-24 PROCEDURE — 6370000000 HC RX 637 (ALT 250 FOR IP): Performed by: NURSE PRACTITIONER

## 2024-06-24 PROCEDURE — 2580000003 HC RX 258: Performed by: NURSE ANESTHETIST, CERTIFIED REGISTERED

## 2024-06-24 PROCEDURE — 0BNC0ZZ RELEASE RIGHT UPPER LUNG LOBE, OPEN APPROACH: ICD-10-PCS | Performed by: STUDENT IN AN ORGANIZED HEALTH CARE EDUCATION/TRAINING PROGRAM

## 2024-06-24 PROCEDURE — 2700000000 HC OXYGEN THERAPY PER DAY

## 2024-06-24 PROCEDURE — 36415 COLL VENOUS BLD VENIPUNCTURE: CPT

## 2024-06-24 PROCEDURE — 74018 RADEX ABDOMEN 1 VIEW: CPT

## 2024-06-24 RX ORDER — PROPOFOL 10 MG/ML
INJECTION, EMULSION INTRAVENOUS PRN
Status: DISCONTINUED | OUTPATIENT
Start: 2024-06-24 | End: 2024-06-24 | Stop reason: SDUPTHER

## 2024-06-24 RX ORDER — ACETAMINOPHEN 500 MG
1000 TABLET ORAL EVERY 6 HOURS
Status: DISCONTINUED | OUTPATIENT
Start: 2024-06-24 | End: 2024-06-28 | Stop reason: HOSPADM

## 2024-06-24 RX ORDER — OXYCODONE HYDROCHLORIDE 5 MG/1
5 TABLET ORAL EVERY 4 HOURS PRN
Status: DISCONTINUED | OUTPATIENT
Start: 2024-06-24 | End: 2024-06-28 | Stop reason: HOSPADM

## 2024-06-24 RX ORDER — MORPHINE SULFATE 2 MG/ML
2 INJECTION, SOLUTION INTRAMUSCULAR; INTRAVENOUS
Status: DISCONTINUED | OUTPATIENT
Start: 2024-06-24 | End: 2024-06-27

## 2024-06-24 RX ORDER — LIDOCAINE HYDROCHLORIDE 20 MG/ML
INJECTION, SOLUTION INFILTRATION; PERINEURAL PRN
Status: DISCONTINUED | OUTPATIENT
Start: 2024-06-24 | End: 2024-06-24 | Stop reason: SDUPTHER

## 2024-06-24 RX ORDER — POLYETHYLENE GLYCOL 3350 17 G/17G
17 POWDER, FOR SOLUTION ORAL DAILY
Status: DISCONTINUED | OUTPATIENT
Start: 2024-06-24 | End: 2024-06-28 | Stop reason: HOSPADM

## 2024-06-24 RX ORDER — ONDANSETRON 4 MG/1
4 TABLET, ORALLY DISINTEGRATING ORAL EVERY 8 HOURS PRN
Status: DISCONTINUED | OUTPATIENT
Start: 2024-06-24 | End: 2024-06-28 | Stop reason: HOSPADM

## 2024-06-24 RX ORDER — SODIUM CHLORIDE 0.9 % (FLUSH) 0.9 %
5-40 SYRINGE (ML) INJECTION EVERY 12 HOURS SCHEDULED
Status: DISCONTINUED | OUTPATIENT
Start: 2024-06-24 | End: 2024-06-28 | Stop reason: HOSPADM

## 2024-06-24 RX ORDER — BISACODYL 10 MG
10 SUPPOSITORY, RECTAL RECTAL DAILY PRN
Status: DISCONTINUED | OUTPATIENT
Start: 2024-06-24 | End: 2024-06-28 | Stop reason: HOSPADM

## 2024-06-24 RX ORDER — OXYCODONE HYDROCHLORIDE 5 MG/1
10 TABLET ORAL EVERY 4 HOURS PRN
Status: DISCONTINUED | OUTPATIENT
Start: 2024-06-24 | End: 2024-06-28 | Stop reason: HOSPADM

## 2024-06-24 RX ORDER — SODIUM CHLORIDE, SODIUM LACTATE, POTASSIUM CHLORIDE, CALCIUM CHLORIDE 600; 310; 30; 20 MG/100ML; MG/100ML; MG/100ML; MG/100ML
INJECTION, SOLUTION INTRAVENOUS CONTINUOUS PRN
Status: DISCONTINUED | OUTPATIENT
Start: 2024-06-24 | End: 2024-06-24 | Stop reason: SDUPTHER

## 2024-06-24 RX ORDER — MAGNESIUM HYDROXIDE 1200 MG/15ML
LIQUID ORAL CONTINUOUS PRN
Status: COMPLETED | OUTPATIENT
Start: 2024-06-24 | End: 2024-06-24

## 2024-06-24 RX ORDER — KETAMINE HCL IN NACL, ISO-OSM 100MG/10ML
SYRINGE (ML) INJECTION PRN
Status: DISCONTINUED | OUTPATIENT
Start: 2024-06-24 | End: 2024-06-24 | Stop reason: SDUPTHER

## 2024-06-24 RX ORDER — MORPHINE SULFATE 4 MG/ML
4 INJECTION, SOLUTION INTRAMUSCULAR; INTRAVENOUS
Status: DISCONTINUED | OUTPATIENT
Start: 2024-06-24 | End: 2024-06-27

## 2024-06-24 RX ORDER — PROPOFOL 10 MG/ML
5-50 INJECTION, EMULSION INTRAVENOUS CONTINUOUS
Status: DISCONTINUED | OUTPATIENT
Start: 2024-06-24 | End: 2024-06-25

## 2024-06-24 RX ORDER — FENTANYL CITRATE 50 UG/ML
INJECTION, SOLUTION INTRAMUSCULAR; INTRAVENOUS PRN
Status: DISCONTINUED | OUTPATIENT
Start: 2024-06-24 | End: 2024-06-24 | Stop reason: SDUPTHER

## 2024-06-24 RX ORDER — DEXAMETHASONE SODIUM PHOSPHATE 4 MG/ML
INJECTION, SOLUTION INTRA-ARTICULAR; INTRALESIONAL; INTRAMUSCULAR; INTRAVENOUS; SOFT TISSUE PRN
Status: DISCONTINUED | OUTPATIENT
Start: 2024-06-24 | End: 2024-06-24 | Stop reason: SDUPTHER

## 2024-06-24 RX ORDER — SODIUM CHLORIDE 0.9 % (FLUSH) 0.9 %
5-40 SYRINGE (ML) INJECTION PRN
Status: DISCONTINUED | OUTPATIENT
Start: 2024-06-24 | End: 2024-06-28 | Stop reason: HOSPADM

## 2024-06-24 RX ORDER — ROCURONIUM BROMIDE 10 MG/ML
INJECTION, SOLUTION INTRAVENOUS PRN
Status: DISCONTINUED | OUTPATIENT
Start: 2024-06-24 | End: 2024-06-24 | Stop reason: SDUPTHER

## 2024-06-24 RX ORDER — GINSENG 100 MG
CAPSULE ORAL DAILY
Status: DISCONTINUED | OUTPATIENT
Start: 2024-06-25 | End: 2024-06-28 | Stop reason: HOSPADM

## 2024-06-24 RX ORDER — ONDANSETRON 2 MG/ML
INJECTION INTRAMUSCULAR; INTRAVENOUS PRN
Status: DISCONTINUED | OUTPATIENT
Start: 2024-06-24 | End: 2024-06-24 | Stop reason: SDUPTHER

## 2024-06-24 RX ORDER — POTASSIUM CHLORIDE 20 MEQ/1
40 TABLET, EXTENDED RELEASE ORAL PRN
Status: DISCONTINUED | OUTPATIENT
Start: 2024-06-24 | End: 2024-06-28 | Stop reason: HOSPADM

## 2024-06-24 RX ORDER — BISACODYL 5 MG/1
5 TABLET, DELAYED RELEASE ORAL DAILY
Status: DISCONTINUED | OUTPATIENT
Start: 2024-06-24 | End: 2024-06-28 | Stop reason: HOSPADM

## 2024-06-24 RX ORDER — MAGNESIUM SULFATE IN WATER 40 MG/ML
2000 INJECTION, SOLUTION INTRAVENOUS PRN
Status: DISCONTINUED | OUTPATIENT
Start: 2024-06-24 | End: 2024-06-28 | Stop reason: HOSPADM

## 2024-06-24 RX ORDER — ONDANSETRON 2 MG/ML
4 INJECTION INTRAMUSCULAR; INTRAVENOUS EVERY 6 HOURS PRN
Status: DISCONTINUED | OUTPATIENT
Start: 2024-06-24 | End: 2024-06-28 | Stop reason: HOSPADM

## 2024-06-24 RX ORDER — ALBUTEROL SULFATE 2.5 MG/3ML
2.5 SOLUTION RESPIRATORY (INHALATION)
Status: DISCONTINUED | OUTPATIENT
Start: 2024-06-24 | End: 2024-06-26

## 2024-06-24 RX ORDER — POTASSIUM CHLORIDE 7.45 MG/ML
10 INJECTION INTRAVENOUS PRN
Status: DISCONTINUED | OUTPATIENT
Start: 2024-06-24 | End: 2024-06-28 | Stop reason: HOSPADM

## 2024-06-24 RX ORDER — METHOCARBAMOL 750 MG/1
750 TABLET, FILM COATED ORAL EVERY 8 HOURS PRN
Status: DISCONTINUED | OUTPATIENT
Start: 2024-06-24 | End: 2024-06-28

## 2024-06-24 RX ORDER — SODIUM CHLORIDE 9 MG/ML
INJECTION, SOLUTION INTRAVENOUS CONTINUOUS PRN
Status: DISCONTINUED | OUTPATIENT
Start: 2024-06-24 | End: 2024-06-24 | Stop reason: SDUPTHER

## 2024-06-24 RX ORDER — FAMOTIDINE 20 MG/1
20 TABLET, FILM COATED ORAL 2 TIMES DAILY
Status: DISCONTINUED | OUTPATIENT
Start: 2024-06-24 | End: 2024-06-28 | Stop reason: HOSPADM

## 2024-06-24 RX ORDER — FENTANYL CITRATE-0.9 % NACL/PF 10 MCG/ML
25-200 PLASTIC BAG, INJECTION (ML) INTRAVENOUS CONTINUOUS
Status: DISCONTINUED | OUTPATIENT
Start: 2024-06-24 | End: 2024-06-25

## 2024-06-24 RX ORDER — SODIUM CHLORIDE 9 MG/ML
INJECTION, SOLUTION INTRAVENOUS PRN
Status: DISCONTINUED | OUTPATIENT
Start: 2024-06-24 | End: 2024-06-24

## 2024-06-24 RX ORDER — CHLORHEXIDINE GLUCONATE ORAL RINSE 1.2 MG/ML
15 SOLUTION DENTAL 2 TIMES DAILY
Status: DISCONTINUED | OUTPATIENT
Start: 2024-06-24 | End: 2024-06-25

## 2024-06-24 RX ORDER — SODIUM CHLORIDE 9 MG/ML
INJECTION, SOLUTION INTRAVENOUS PRN
Status: DISCONTINUED | OUTPATIENT
Start: 2024-06-24 | End: 2024-06-28 | Stop reason: HOSPADM

## 2024-06-24 RX ADMIN — ROCURONIUM BROMIDE 30 MG: 50 INJECTION, SOLUTION INTRAVENOUS at 08:19

## 2024-06-24 RX ADMIN — FENTANYL CITRATE 100 MCG: 50 INJECTION, SOLUTION INTRAMUSCULAR; INTRAVENOUS at 08:00

## 2024-06-24 RX ADMIN — DEXAMETHASONE SODIUM PHOSPHATE 10 MG: 4 INJECTION, SOLUTION INTRAMUSCULAR; INTRAVENOUS at 08:06

## 2024-06-24 RX ADMIN — ACETAMINOPHEN 1000 MG: 500 TABLET ORAL at 13:30

## 2024-06-24 RX ADMIN — ALBUTEROL SULFATE 2.5 MG: 2.5 SOLUTION RESPIRATORY (INHALATION) at 19:53

## 2024-06-24 RX ADMIN — PROPOFOL 45 MCG/KG/MIN: 10 INJECTION, EMULSION INTRAVENOUS at 15:12

## 2024-06-24 RX ADMIN — CHLORHEXIDINE GLUCONATE 15 ML: 1.2 RINSE ORAL at 21:03

## 2024-06-24 RX ADMIN — Medication 2 AMPULE: at 07:58

## 2024-06-24 RX ADMIN — ROCURONIUM BROMIDE 50 MG: 50 INJECTION, SOLUTION INTRAVENOUS at 11:31

## 2024-06-24 RX ADMIN — Medication 150 MCG/HR: at 17:35

## 2024-06-24 RX ADMIN — LIDOCAINE HYDROCHLORIDE 80 MG: 20 INJECTION, SOLUTION INFILTRATION; PERINEURAL at 08:00

## 2024-06-24 RX ADMIN — ONDANSETRON 4 MG: 2 INJECTION INTRAMUSCULAR; INTRAVENOUS at 08:06

## 2024-06-24 RX ADMIN — PIPERACILLIN AND TAZOBACTAM 3375 MG: 3; .375 INJECTION, POWDER, LYOPHILIZED, FOR SOLUTION INTRAVENOUS at 00:02

## 2024-06-24 RX ADMIN — Medication 50 MCG/HR: at 12:42

## 2024-06-24 RX ADMIN — CHLORHEXIDINE GLUCONATE 15 ML: 1.2 RINSE ORAL at 13:30

## 2024-06-24 RX ADMIN — POLYETHYLENE GLYCOL 3350 17 G: 17 POWDER, FOR SOLUTION ORAL at 13:30

## 2024-06-24 RX ADMIN — ROCURONIUM BROMIDE 20 MG: 50 INJECTION, SOLUTION INTRAVENOUS at 09:15

## 2024-06-24 RX ADMIN — ACETAMINOPHEN 1000 MG: 500 TABLET ORAL at 21:03

## 2024-06-24 RX ADMIN — FAMOTIDINE 20 MG: 20 TABLET, FILM COATED ORAL at 21:03

## 2024-06-24 RX ADMIN — SODIUM CHLORIDE: 9 INJECTION, SOLUTION INTRAVENOUS at 07:55

## 2024-06-24 RX ADMIN — Medication 30 MG: at 08:42

## 2024-06-24 RX ADMIN — VANCOMYCIN HYDROCHLORIDE 2000 MG: 10 INJECTION, POWDER, LYOPHILIZED, FOR SOLUTION INTRAVENOUS at 08:14

## 2024-06-24 RX ADMIN — PROPOFOL 45 MCG/KG/MIN: 10 INJECTION, EMULSION INTRAVENOUS at 19:40

## 2024-06-24 RX ADMIN — PROPOFOL 200 MG: 10 INJECTION, EMULSION INTRAVENOUS at 08:00

## 2024-06-24 RX ADMIN — PROPOFOL 50 MCG/KG/MIN: 10 INJECTION, EMULSION INTRAVENOUS at 12:52

## 2024-06-24 RX ADMIN — Medication 10 ML: at 21:03

## 2024-06-24 RX ADMIN — PROPOFOL 45 MCG/KG/MIN: 10 INJECTION, EMULSION INTRAVENOUS at 17:33

## 2024-06-24 RX ADMIN — PROPOFOL 40 MCG/KG/MIN: 10 INJECTION, EMULSION INTRAVENOUS at 22:06

## 2024-06-24 RX ADMIN — ROCURONIUM BROMIDE 40 MG: 50 INJECTION, SOLUTION INTRAVENOUS at 08:10

## 2024-06-24 RX ADMIN — ALBUTEROL SULFATE 2.5 MG: 2.5 SOLUTION RESPIRATORY (INHALATION) at 16:00

## 2024-06-24 RX ADMIN — SODIUM CHLORIDE, SODIUM LACTATE, POTASSIUM CHLORIDE, AND CALCIUM CHLORIDE: .6; .31; .03; .02 INJECTION, SOLUTION INTRAVENOUS at 07:55

## 2024-06-24 RX ADMIN — ROCURONIUM BROMIDE 10 MG: 50 INJECTION, SOLUTION INTRAVENOUS at 10:26

## 2024-06-24 RX ADMIN — ROCURONIUM BROMIDE 10 MG: 50 INJECTION, SOLUTION INTRAVENOUS at 08:00

## 2024-06-24 RX ADMIN — PIPERACILLIN AND TAZOBACTAM 3375 MG: 3; .375 INJECTION, POWDER, LYOPHILIZED, FOR SOLUTION INTRAVENOUS at 06:40

## 2024-06-24 RX ADMIN — Medication 150 MCG/HR: at 23:46

## 2024-06-24 ASSESSMENT — PULMONARY FUNCTION TESTS
PIF_VALUE: 24
PIF_VALUE: 25
PIF_VALUE: 40
PIF_VALUE: 18
PIF_VALUE: 20
PIF_VALUE: 21
PIF_VALUE: 20
PIF_VALUE: 20
PIF_VALUE: 23
PIF_VALUE: 19
PIF_VALUE: 20
PIF_VALUE: 22
PIF_VALUE: 22
PIF_VALUE: 19
PIF_VALUE: 22
PIF_VALUE: 20

## 2024-06-24 ASSESSMENT — PAIN SCALES - GENERAL
PAINLEVEL_OUTOF10: 0

## 2024-06-24 NOTE — ANESTHESIA POSTPROCEDURE EVALUATION
Department of Anesthesiology  Postprocedure Note    Patient: Ángel Stauffer  MRN: [id number]  YOB: 1985  Date of evaluation: 6/24/2024    Procedure Summary       Date: 06/24/24 Room / Location: [address]    Anesthesia Start: 0755 Anesthesia Stop: 1207    Procedure: OPEN RIGHT THORACOTOMY WITH TOTAL LUNG DECORTICATION (Right) Diagnosis:       Empyema (HCC)      (Empyema (HCC) [J86.9])    Surgeons: Emmanuel Terry MD Responsible Provider: Salvador Dotson MD    Anesthesia Type: general ASA Status: 3            Anesthesia Type: No value filed.    Le[place] Phase I:      Le[place] Phase II:      Anesthesia Post Evaluation    Patient location during evaluation: ICU  Patient participation: complete - patient cannot participate  Level of consciousness: sedated and ventilated  Airway patency: patent  Nausea & Vomiting: no nausea and no vomiting  Cardiovascular status: hemodynamically stable  Respiratory status: acceptable  Hydration status: euvolemic  Pain management: adequate    No notable events documented.

## 2024-06-24 NOTE — PROGRESS NOTES
06/24/24 1203   Patient Observation   Pulse 92   Respirations 16   SpO2 95 %   Breath Sounds   Right Upper Lobe Diminished   Right Middle Lobe Diminished   Right Lower Lobe Diminished   Left Upper Lobe Diminished   Left Lower Lobe Diminished   Vent Information   $Ventilation $Initial Day   Ventilator Settings   Vt (Set, mL) 650 mL   Resp Rate (Set) 16 bpm   PEEP/CPAP (cmH2O) 5   FiO2  50 %   Vent Patient Data (Readings)   Vt (Measured) 668 mL   Peak Inspiratory Pressure (cmH2O) 25 cmH2O   Rate Measured 16 br/min   Minute Volume (L/min) 10.7 Liters   Mean Airway Pressure (cmH2O) 12 cmH20   Plateau Pressure (cm H2O) 0 cm H2O   Driving Pressure -5   I:E Ratio 1:1.60   Vent Alarm Settings   High Pressure (cmH2O) 40 cmH2O   Low Minute Volume (lpm) 2 L/min   Low Exhaled Vt (ml) 200 mL   RR High (bpm) 40 br/min   Apnea (secs) 20 secs   Additional Respiratoray Assessments   Humidification Source HME   Ambu Bag With Mask At Bedside Yes   Airway Clearance   Suction ET Tube   Suction Device Inline suction catheter   Sputum Method Obtained Endotracheal   Sputum Amount Small   Sputum Color/Odor Bloody   Sputum Consistency Thin   ETT    Placement Date/Time: 06/24/24 0803   Present on Admission/Arrival: No  Placed By: In surgery  Placement Verified By: Direct visualization;Auscultation;Capnometry  Preoxygenation: No  Mask Ventilation: Ventilated by mask (1)  Technique: Video laryngosc...   Secured At 23 cm   Measured From Lips   ETT Placement Center   Secured By Commercial tube fregoso   Cuff Pressure 32 cm H2O

## 2024-06-24 NOTE — CONSULTS
Comprehensive Nutrition Assessment    Type and Reason for Visit:  Initial, Consult    Nutrition Recommendations/Plan:   NPO  If unable to extubate, recommend OGT placement and TF initiation. Order: \"Diet: Tube feed continuous/ NPO\".  Initiate Vital HP (Peptide based high protein formula) at 10 mL/hr and as tolerated, increase by 10 mL/hr q 4 hours until goal of 20 mL/hr is met via O/G   Recommend 60 mL H20 flush q 4 hours.  Monitor IVF infusion, Na labs and need for adjustments in water flush  Recommend 3 Bottles Proteinex P2Go daily via syringe. Flush with 30 mL H20 before and after  Monitor TF tolerance (abd distention, bowel habits, N/V, cramping)  Check TG while on diprivan infusion  Monitor nutrition adequacy, pertinent labs, bowel habits, wt changes, and clinical progress     Malnutrition Assessment:  Malnutrition Status:  At risk for malnutrition (Comment) (06/24/24 9900)    Context:  Acute Illness     Findings of the 6 clinical characteristics of malnutrition:  Energy Intake:  Mild decrease in energy intake (Comment)  Fluid Accumulation:  Mild Extremities    Nutrition Assessment:    Consult for TF order and management: 39 y.o. m w/ PMH of COPD, GERD, CHIDI, HLD, HTN, & gout admitted w/ R empyema, s/p VATS with decort today. S/p thoracentesis on 6/17. Previously on regular diet w/ PO intakes % of meals in EMR. Pt continues on vent support after surgery. Sedated on fentanyl and propofol at 49.4 ml/hr to provide 1304 kcals from lipids. Possible extubation today, per RN. No wt loss in EMR. NPO. No feeding tube placed currently. Plans to place an OGT if unable to extubate. TF recommendations included, will continue to monitor.    Nutrition Related Findings:    + BM 6/22. BLE + 2 edema. No updated labs to review. Wound Type: Surgical Incision       Current Nutrition Intake & Therapies:    Average Meal Intake: NPO  Average Supplements Intake: NPO  Diet NPO  Current Tube Feeding (TF) Orders:  Feeding Route:

## 2024-06-24 NOTE — PROGRESS NOTES
06/24/24 1956   Patient Observation   Pulse 78   Respirations 18   SpO2 98 %   Vent Information   Vent Mode AC/VC   Ventilator Settings   Vt (Set, mL) 520 mL   Resp Rate (Set) 18 bpm   PEEP/CPAP (cmH2O) 5   FiO2  40 %   Vent Patient Data (Readings)   Vt (Measured) 535 mL   Peak Inspiratory Pressure (cmH2O) 20 cmH2O   Rate Measured 18 br/min   Minute Volume (L/min) 9.51 Liters   Mean Airway Pressure (cmH2O) 9.9 cmH20   Plateau Pressure (cm H2O) 0 cm H2O   Driving Pressure -5   I:E Ratio 1:1.90   Backup Apnea On   Backup Rate 18 Breaths Per Minute   Backup Vt 520   Vent Alarm Settings   High Pressure (cmH2O) 40 cmH2O   Low Minute Volume (lpm) 2 L/min   High Minute Volume (lpm) 20 L/min   Low Exhaled Vt (ml) 200 mL   High Exhaled Vt (ml) 1100 mL   RR High (bpm) 40 br/min   Additional Respiratoray Assessments   Humidification Source HME   Ambu Bag With Mask At Bedside Yes   Airway Clearance   Suction ET Tube   Subglottic Suction Done No   Suction Device Inline suction catheter   Sputum Method Obtained Endotracheal   Sputum Amount Moderate   Sputum Color/Odor Bloody   Sputum Consistency Thin   ETT    Placement Date/Time: 06/24/24 0803   Present on Admission/Arrival: No  Placed By: In surgery  Placement Verified By: Direct visualization;Auscultation;Capnometry  Preoxygenation: No  Mask Ventilation: Ventilated by mask (1)  Technique: Video laryngosc...   Secured At 24 cm   Measured From Lips   ETT Placement Right  (moved from left)   Secured By Commercial tube núñez   Site Assessment Dry   Cuff Pressure 28 cm H2O   Tie/Núñez Changed No

## 2024-06-24 NOTE — OP NOTE
Operative Note      Patient: Ángel Stauffer  YOB: 1985  MRN: [id number]    Date of Procedure: 6/24/2024    Pre-Op Diagnosis Codes:     * Empyema (HCC) [J86.9]    Post-Op Diagnosis: Same       Procedure(s):  OPEN RIGHT THORACOTOMY WITH TOTAL LUNG DECORTICATION    Surgeon(s):  Emmanuel Terry MD    Assistant:   Physician Assistant: Beulah Vanessa PA-C    Anesthesia: General    Estimated Blood Loss (mL): 500    Complications: None    Specimens:   ID Type Source Tests Collected by Time Destination   1 : PLEURAL FLUID FOR TRIGLYCERIDES Body Fluid Ascitic Fluid TRIGLYCERIDES, BODY FLUIDS Emmanuel Terry MD 6/24/2024 1026    A : Pleural Peel Tissue Lung SURGICAL PATHOLOGY Emmanuel Terry MD 6/24/2024 0933        Implants:  * No implants in log *      Drains:   Chest Tube Right Pleural 1 (Active)       Chest Tube Right Pleural 2 (Active)       Urinary Catheter 06/24/24 [place]-Temperature (Active)       [REMOVED] Chest Tube Right (Removed)   Chest Tube Airleak No 06/24/24 0518   Status Continuous Suction 06/24/24 0518   Suction -20 cm H2O 06/24/24 0518   Y Connector Used No 06/24/24 0518   Drainage Description Yellow;Serous 06/24/24 0518   Dressing Status Clean, dry & intact 06/24/24 0518   Chest Tube Dressing Split Gauze 06/24/24 0518   Site Assessment Clean, dry & intact 06/24/24 0518   Surrounding Skin Clean, dry & intact 06/24/24 0518   Output (ml) 50 ml 06/24/24 0518       Findings:  Infection Present At Time Of Surgery (PATOS) (choose all levels that have infection present):  - Deep Infection (muscle/fascia) present as evidenced by purulent fluid  Other Findings: 1cm thick parietal pleura    Detailed Description of Procedure:   After informed consent was obtained patient was brought to the operating room.  Site was marked preoperatively.  He was then placed in supine position general anesthesia was induced with a dual-lumen endotracheal tube confirmed via bronchoscope.  He was then placed in

## 2024-06-24 NOTE — PROGRESS NOTES
Hospital Medicine Progress Note      Date of Admission: 6/20/2024  Hospital Day: 5    Chief Admission Complaint:  empyema     Subjective:  no new c/o    Presenting Admission History:         Ángel Stauffer is a 39 y.o. male with pmh of essential hypertension, who was initially admitted to [facility] on 6/13/2024, with persistent fever up to 102 °F, headache, cough, dizziness; was reportedly discharged 3 weeks prior on levofloxacin and azithromycin for pneumonia; on this admission initially found to have small pleural effusion.  Pleural effusion continued to appear to worsen on serial x-rays and was evaluated with bedside ultrasound by pulmonology team-found to have moderate loculated fluid.  CT-guided chest tube placed on 6/18/2024 by IR team -immediate return of tan purulent fluid.  MIST protocol started by pulmonology team with intrapleural instillation of tPA/Pulmozyme-patient received 3 doses 12 hours apart, last instillation on 6/20 at 7am.  Patient was transferred to [facility] for evaluation for VATS with cardiothoracic surgery.        Assessment/Plan:      Current Principal Problem:  Empyema lung (HCC)      Empyema, pneumonia  - CT surgery consulted  - ID consulted  - chest tube in place  - continue zosyn  - plan for VATS on Monday     Rhinovirus  - supportive care     COPD  - no evidence of exacerbation  - continue inhalers     HTN  - well controlled  - continue losartan     Anemia  - chronic, stable  - continue to monitor     Gout  - s/p prednisone     CHIDI  - CPAP ordered    Physical Exam Performed:      General appearance:  No apparent distress  Respiratory:  Normal respiratory effort.   Cardiovascular:  Regular rate and rhythm.  Abdomen:  Soft, non-tender, non-distended.  Musculoskelatal:  No edema  Neurologic:  Non-focal  Psychiatric:  Alert and oriented    Telemetry monitoring - Personally reviewed and interpreted telemetry on 24 June as ordered with the following findings      [x] NSR  []

## 2024-06-24 NOTE — PROGRESS NOTES
PULMONARY AND CRITICAL CARE INPATIENT NOTE        Ángel Stauffer   : 1985  MRN: [id number]     Admitting Physician: Amando Sebastian MD  Attending Physician: Amando Sebastian MD  PCP: Eddie Calvillo DO    Admission: 2024   Date of Service: 2024    No chief complaint on file.          ASSESSMENT & PLAN       39 y.o. pleasant  male patient with:    Assessment:  Complicated right parapneumonic effusion/empyema.  S/p tPA/dornase installations and chest tube.  S/p VATS/decortication on 2024  Rhinovirus respiratory infection  COPD  CHIDI  Morbid obesity      Plan:              Patient received vancomycin per CT surgery protocol.  Follow-up on cultures and consider broad-spectrum antibiotics if patient spikes fever  Chest tube management per CT surgery  Ventilator settings reviewed and adjusted.  Follow-up on chest x-ray and ABG  Ventilator bundle; HOB 30 degree/Aspiration precautions. Mouth care.  Bronchial hygiene measures  Titrate sedation for ventilator synchrony unless otherwise specified. Daily sedation vacation if no contraindication (paralyzed patient, PEEP >12 and oxygen needs, elevated intracranial pressure, active seizures).  Delirium precautions.  Continue to wean oxygen with target 90 to 94%.  SBT if vent needs/mentation allow as per ventilator weaning protocol.  Keep map above 65 unless otherwise specified  Target Hb >7, Platelets>50 unless specified otherwise.  Bowel regimen: Notify provider if no bowel movement for 48 hrs.  PT/OT when patient is able to participate.  Alert provider about unnecessary catheters or tubes or with early signs of inflammation to discontinue.      LOS: Hospital Day: 1    Code:Full Code      Time spent on this patient, excluding procedures time, is ~35 minutes.  Time was spent on reviewing the EMR, reviewing overnight events, interpreting labs and imaging, interviewing and directly examining the patient, ventilator/breathing assistance device/drip review and  adjustment, running multidisciplinary rounds and coordinating critical care plan with bedside nurse, and other care providers.          Subjective/Objective             Summary:   59-year-old male patient with HTN with recent admission for pneumonia and small pleural effusion with admitted with persistent fever, headache and cough.    Interval history/Encounter 6/24/2024   Patient remained hemodynamically stable and afebrile.  He went for VATS/decortication with CT surgery and remained intubated postoperatively  I's and O's -1850 with urine output of 2 L  Patient remained on Tylenol, albuterol, famotidine  Blood sugar remained within range  No new micro results  CBC remains relatively stable with no new chemistry      Objective    Test Results:  Imaging:  I have reviewed radiology images personally.    XR CHEST PORTABLE    Result Date: 6/24/2024  Persistent trace right pleural effusion and right basilar atelectasis or airspace disease.     XR CHEST PORTABLE    Result Date: 6/23/2024  No significant interval change in small right pleural effusion and right basilar atelectasis/airspace disease as compared to prior.          PFTS:  NA      Cardiology:      Sleep:  NA      Physical Exam:  General appearance: Critically ill on mechanical ventilation  HEENT: ETT in place.    Cardiac: No murmur  Lungs: Decreased air entry on the right side with chest tubes in place  Abdomen: Soft.  Morbid obesity  Skin, Back & Extremities: No rash.  Neurological: No focal or lateralizing signs.  Sedated and mechanically ventilated, symmetrical pupils..       ________________________________________________________  Electronically signed by:  Karrie Belcher MD,FACP    6/24/2024    12:21 PM.     [facility]  [address], [place] [address]   Phone (office): [phone]

## 2024-06-24 NOTE — ANESTHESIA PRE PROCEDURE
Department of Anesthesiology  Preprocedure Note       Name:  Ángel Stauffer   Age:  39 y.o.  :  1985                                          MRN:  [id number]         Date:  2024      Surgeon: Surgeon(s):  Emmanuel Terry MD    Procedure: Procedure(s):  RIGHT VIDEO ASSISTED THORACIC SURGERY WITH DECORTICATION, POSSIBLE OPEN WITH CRYOABLATION                    ATRICURE NOTE:   INTERCOSTAL NERVE BLOCK    Medications prior to admission:   Prior to Admission medications    Medication Sig Start Date End Date Taking? Authorizing Provider   losartan (COZAAR) 100 MG tablet Take 1 tablet by mouth daily    Provider, MD Ave   methylPREDNISolone (MEDROL, MOODY,) 4 MG tablet Take by mouth. 23   Ángel Peralta MD   ibuprofen (ADVIL;MOTRIN) 800 MG tablet Take 1 tablet by mouth 2 times daily as needed for Pain 23   Ángel Peralta MD   ibuprofen (ADVIL;MOTRIN) 800 MG tablet Take 1 tablet by mouth every 8 hours as needed for Pain or Fever 3/7/16   Emmanuel Miranda MD       Current medications:    Current Facility-Administered Medications   Medication Dose Route Frequency Provider Last Rate Last Admin   • acetaminophen (TYLENOL) tablet 1,000 mg  1,000 mg Oral Once Manpreet Godinez APRN - CNP       • vancomycin (VANCOCIN) 2,000 mg in sodium chloride 0.9 % 500 mL IVPB  2,000 mg IntraVENous On Call to OR Manpreet Godinez APRN - CNP       • 0.9 % sodium chloride infusion   IntraVENous PRN Manpreet Godinez APRN - CNP       • piperacillin-tazobactam (ZOSYN) 3,375 mg in sodium chloride 0.9 % 50 mL IVPB (mini-bag)  3,375 mg IntraVENous Q8H Lurdes Eagle MD 12.5 mL/hr at 24 0640 3,375 mg at 24 0640   • budesonide (PULMICORT) nebulizer suspension 500 mcg  0.5 mg Nebulization BID RT Jono Reed MD   500 mcg at 24   • enoxaparin (LOVENOX) injection 40 mg  40 mg SubCUTAneous BID Jono Reed MD   40 mg at 24   • ipratropium 0.5 mg-albuterol 2.5 mg (DUONEB) nebulizer

## 2024-06-24 NOTE — CARE COORDINATION
CM update- LOS day 4- Pt had surgery today. Open Rt thoracotomy w/ total lung decortication. Pt remains on the vent 50 %/p5. Will cont to follow for any DC needs.

## 2024-06-24 NOTE — PROGRESS NOTES
06/24/24 1604   Patient Observation   Pulse 73   Respirations 16   SpO2 96 %   Breath Sounds   Right Upper Lobe Diminished   Right Middle Lobe Diminished   Right Lower Lobe Diminished   Left Upper Lobe Diminished   Left Lower Lobe Diminished   Vent Information   Vent Mode AC/VC   Ventilator Settings   Vt (Set, mL) 520 mL   Resp Rate (Set) 18 bpm   PEEP/CPAP (cmH2O) 5   FiO2  50 %   Vent Patient Data (Readings)   Vt (Measured) 525 mL   Peak Inspiratory Pressure (cmH2O) 20 cmH2O   Rate Measured 18 br/min   Minute Volume (L/min) 9.46 Liters   Mean Airway Pressure (cmH2O) 11 cmH20   Plateau Pressure (cm H2O) 0 cm H2O   Driving Pressure -5   I:E Ratio 1:1.90   Backup Apnea On   Vent Alarm Settings   High Pressure (cmH2O) 40 cmH2O   Low Minute Volume (lpm) 2 L/min   Low Exhaled Vt (ml) 200 mL   RR High (bpm) 40 br/min   Apnea (secs) 20 secs   Additional Respiratoray Assessments   Humidification Source HME   Ambu Bag With Mask At Bedside Yes   Airway Clearance   Suction ET Tube   Suction Device Inline suction catheter   Sputum Method Obtained Endotracheal   Sputum Amount Moderate   Sputum Color/Odor Bloody   Sputum Consistency Thin   ETT    Placement Date/Time: 06/24/24 0803   Present on Admission/Arrival: No  Placed By: In surgery  Placement Verified By: Direct visualization;Auscultation;Capnometry  Preoxygenation: No  Mask Ventilation: Ventilated by mask (1)  Technique: Video laryngosc...   Secured At 24 cm   Measured From Lips   ETT Placement Right   Secured By Commercial tube fregoso   Site Assessment Dry   Cuff Pressure 32 cm H2O

## 2024-06-24 NOTE — PROGRESS NOTES
Shift: [id number]    Procedure:     OPEN RIGHT THORACOTOMY WITH TOTAL LUNG DECORTICATION     Surgeon(s):  Emmanuel Terry MD     Assistant:   Physician Assistant: Beulah Vanessa PA-C     Anesthesia: General     Estimated Blood Loss (mL): 500     Complications: None    Admit from OR (time and date): 6/24/2024 @ 1200      Surgery, return to OR no     Nursing assessment at handoff  stable    Most recent vitals: BP (!) 107/56   Pulse 84   Temp 97.9 °F (36.6 °C) (Axillary)   Resp 18   Ht 1.854 m (6' 1\")   Wt (!) 164.8 kg (363 lb 6.4 oz)   SpO2 96%   BMI 47.94 kg/m²      Increased O2 requirements: no O2 requirements: Oxygen Therapy  SpO2: 96 %  Pulse Oximetry Type: Continuous  Pulse Oximeter Device Mode: Continuous  Pulse Oximeter Device Location: Finger  O2 Device: Ventilator  Oximetry Probe Site Changed: Yes  Skin Assessment: Clean, dry, & intact  Skin Protection for O2 Device: Yes  Orientation: Middle  Location: Nose  Intervention(s): Skin Barrier  FiO2 : 50 %  Vent Mode: AC/VC     Admission weight Weight - Scale: (!) 166.2 kg (366 lb 4.8 oz)  Today's weight   Wt Readings from Last 1 Encounters:   06/24/24 (!) 164.8 kg (363 lb 6.4 oz)           Drop in Urinary Output no     Rhythm Changes Normal Sinus Rhythm      Lines/Drains  LDA Insertion Date Discontinued Date Dressing Changes   Art line      Central Line      Soria 6/24/24     Chest Tube 6/24/24     Wires       ETT 6/24/24     GABRIEL Drain      VasCath      Impella        Interventions After Office Hours  Problem(Brief) Date Time Intervention Physician contacted                                               Drip rates at handoff:    sodium chloride      fentaNYL 150 mcg/hr (06/24/24 1735)    propofol 45 mcg/kg/min (06/24/24 1733)       Hospital Course:  POD#0 Days:   -Patient out of OR at 1200  -Remained hemodynamically stable throughout shift   -Will remain mechanically ventilated today per Dr. Terry   -Patients wife at bedside and updated on plan of  done

## 2024-06-25 ENCOUNTER — APPOINTMENT (OUTPATIENT)
Dept: GENERAL RADIOLOGY | Age: 39
End: 2024-06-25
Attending: INTERNAL MEDICINE
Payer: COMMERCIAL

## 2024-06-25 LAB
ANION GAP SERPL CALCULATED.3IONS-SCNC: 10 MMOL/L (ref 3–16)
BASE EXCESS BLDA CALC-SCNC: 5 MMOL/L (ref -3–3)
BUN SERPL-MCNC: 15 MG/DL (ref 7–20)
CA-I BLD-SCNC: 1.18 MMOL/L (ref 1.12–1.32)
CALCIUM SERPL-MCNC: 8.7 MG/DL (ref 8.3–10.6)
CHLORIDE SERPL-SCNC: 99 MMOL/L (ref 99–110)
CO2 BLDA-SCNC: 32 MMOL/L
CO2 SERPL-SCNC: 27 MMOL/L (ref 21–32)
CREAT SERPL-MCNC: 1.4 MG/DL (ref 0.9–1.3)
DEPRECATED RDW RBC AUTO: 14 % (ref 12.4–15.4)
GFR SERPLBLD CREATININE-BSD FMLA CKD-EPI: 65 ML/MIN/{1.73_M2}
GLUCOSE BLD-MCNC: 122 MG/DL (ref 70–99)
GLUCOSE SERPL-MCNC: 124 MG/DL (ref 70–99)
HCO3 BLDA-SCNC: 30 MMOL/L (ref 21–29)
HCT VFR BLD AUTO: 33.6 % (ref 40.5–52.5)
HCT VFR BLD AUTO: 37 % (ref 40.5–52.5)
HGB BLD CALC-MCNC: 12.4 GM/DL (ref 13.5–17.5)
HGB BLD-MCNC: 11.1 G/DL (ref 13.5–17.5)
LACTATE BLD-SCNC: 0.87 MMOL/L (ref 0.4–2)
MAGNESIUM SERPL-MCNC: 1.9 MG/DL (ref 1.8–2.4)
MCH RBC QN AUTO: 29.2 PG (ref 26–34)
MCHC RBC AUTO-ENTMCNC: 32.9 G/DL (ref 31–36)
MCV RBC AUTO: 88.8 FL (ref 80–100)
PCO2 BLDA: 48.8 MM HG (ref 35–45)
PERFORMED ON: ABNORMAL
PH BLDA: 7.4 [PH] (ref 7.35–7.45)
PHOSPHATE SERPL-MCNC: 4.9 MG/DL (ref 2.5–4.9)
PLATELET # BLD AUTO: 380 K/UL (ref 135–450)
PMV BLD AUTO: 7.3 FL (ref 5–10.5)
PO2 BLDA: 140.9 MM HG (ref 75–108)
POC SAMPLE TYPE: ABNORMAL
POTASSIUM BLD-SCNC: 4.8 MMOL/L (ref 3.5–5.1)
POTASSIUM SERPL-SCNC: 4.9 MMOL/L (ref 3.5–5.1)
RBC # BLD AUTO: 3.78 M/UL (ref 4.2–5.9)
SAO2 % BLDA: 99 % (ref 93–100)
SODIUM BLD-SCNC: 140 MMOL/L (ref 136–145)
SODIUM SERPL-SCNC: 136 MMOL/L (ref 136–145)
WBC # BLD AUTO: 16 K/UL (ref 4–11)

## 2024-06-25 PROCEDURE — 2580000003 HC RX 258: Performed by: INTERNAL MEDICINE

## 2024-06-25 PROCEDURE — 82947 ASSAY GLUCOSE BLOOD QUANT: CPT

## 2024-06-25 PROCEDURE — 97530 THERAPEUTIC ACTIVITIES: CPT

## 2024-06-25 PROCEDURE — 71045 X-RAY EXAM CHEST 1 VIEW: CPT

## 2024-06-25 PROCEDURE — 6360000002 HC RX W HCPCS: Performed by: INTERNAL MEDICINE

## 2024-06-25 PROCEDURE — 85027 COMPLETE CBC AUTOMATED: CPT

## 2024-06-25 PROCEDURE — 97166 OT EVAL MOD COMPLEX 45 MIN: CPT

## 2024-06-25 PROCEDURE — 97162 PT EVAL MOD COMPLEX 30 MIN: CPT

## 2024-06-25 PROCEDURE — 6360000002 HC RX W HCPCS

## 2024-06-25 PROCEDURE — 82330 ASSAY OF CALCIUM: CPT

## 2024-06-25 PROCEDURE — 97110 THERAPEUTIC EXERCISES: CPT

## 2024-06-25 PROCEDURE — 85014 HEMATOCRIT: CPT

## 2024-06-25 PROCEDURE — 84295 ASSAY OF SERUM SODIUM: CPT

## 2024-06-25 PROCEDURE — 2500000003 HC RX 250 WO HCPCS

## 2024-06-25 PROCEDURE — 6370000000 HC RX 637 (ALT 250 FOR IP): Performed by: INTERNAL MEDICINE

## 2024-06-25 PROCEDURE — 6370000000 HC RX 637 (ALT 250 FOR IP): Performed by: NURSE PRACTITIONER

## 2024-06-25 PROCEDURE — 83735 ASSAY OF MAGNESIUM: CPT

## 2024-06-25 PROCEDURE — 82803 BLOOD GASES ANY COMBINATION: CPT

## 2024-06-25 PROCEDURE — 6360000002 HC RX W HCPCS: Performed by: NURSE PRACTITIONER

## 2024-06-25 PROCEDURE — 36415 COLL VENOUS BLD VENIPUNCTURE: CPT

## 2024-06-25 PROCEDURE — 94660 CPAP INITIATION&MGMT: CPT

## 2024-06-25 PROCEDURE — 6370000000 HC RX 637 (ALT 250 FOR IP)

## 2024-06-25 PROCEDURE — 2580000003 HC RX 258: Performed by: NURSE PRACTITIONER

## 2024-06-25 PROCEDURE — 99024 POSTOP FOLLOW-UP VISIT: CPT

## 2024-06-25 PROCEDURE — 94003 VENT MGMT INPAT SUBQ DAY: CPT

## 2024-06-25 PROCEDURE — 83605 ASSAY OF LACTIC ACID: CPT

## 2024-06-25 PROCEDURE — 94640 AIRWAY INHALATION TREATMENT: CPT

## 2024-06-25 PROCEDURE — 94761 N-INVAS EAR/PLS OXIMETRY MLT: CPT

## 2024-06-25 PROCEDURE — 94669 MECHANICAL CHEST WALL OSCILL: CPT

## 2024-06-25 PROCEDURE — 80048 BASIC METABOLIC PNL TOTAL CA: CPT

## 2024-06-25 PROCEDURE — 84100 ASSAY OF PHOSPHORUS: CPT

## 2024-06-25 PROCEDURE — 2580000003 HC RX 258

## 2024-06-25 PROCEDURE — 2700000000 HC OXYGEN THERAPY PER DAY

## 2024-06-25 PROCEDURE — 84132 ASSAY OF SERUM POTASSIUM: CPT

## 2024-06-25 PROCEDURE — 99232 SBSQ HOSP IP/OBS MODERATE 35: CPT | Performed by: INTERNAL MEDICINE

## 2024-06-25 PROCEDURE — 2000000000 HC ICU R&B

## 2024-06-25 PROCEDURE — 99291 CRITICAL CARE FIRST HOUR: CPT | Performed by: INTERNAL MEDICINE

## 2024-06-25 RX ORDER — HYDRALAZINE HYDROCHLORIDE 20 MG/ML
5 INJECTION INTRAMUSCULAR; INTRAVENOUS EVERY 6 HOURS PRN
Status: DISCONTINUED | OUTPATIENT
Start: 2024-06-25 | End: 2024-06-28 | Stop reason: HOSPADM

## 2024-06-25 RX ORDER — SODIUM CHLORIDE 9 MG/ML
INJECTION, SOLUTION INTRAVENOUS CONTINUOUS
Status: DISCONTINUED | OUTPATIENT
Start: 2024-06-25 | End: 2024-06-25

## 2024-06-25 RX ORDER — GABAPENTIN 300 MG/1
300 CAPSULE ORAL 3 TIMES DAILY
Status: DISCONTINUED | OUTPATIENT
Start: 2024-06-25 | End: 2024-06-28 | Stop reason: HOSPADM

## 2024-06-25 RX ORDER — SODIUM CHLORIDE, SODIUM LACTATE, POTASSIUM CHLORIDE, CALCIUM CHLORIDE 600; 310; 30; 20 MG/100ML; MG/100ML; MG/100ML; MG/100ML
INJECTION, SOLUTION INTRAVENOUS CONTINUOUS
Status: ACTIVE | OUTPATIENT
Start: 2024-06-25 | End: 2024-06-25

## 2024-06-25 RX ORDER — MAGNESIUM SULFATE IN WATER 40 MG/ML
2000 INJECTION, SOLUTION INTRAVENOUS ONCE
Status: COMPLETED | OUTPATIENT
Start: 2024-06-25 | End: 2024-06-25

## 2024-06-25 RX ADMIN — SODIUM CHLORIDE, POTASSIUM CHLORIDE, SODIUM LACTATE AND CALCIUM CHLORIDE: 600; 310; 30; 20 INJECTION, SOLUTION INTRAVENOUS at 12:22

## 2024-06-25 RX ADMIN — PROPOFOL 35 MCG/KG/MIN: 10 INJECTION, EMULSION INTRAVENOUS at 01:05

## 2024-06-25 RX ADMIN — GABAPENTIN 300 MG: 300 CAPSULE ORAL at 20:22

## 2024-06-25 RX ADMIN — GABAPENTIN 300 MG: 300 CAPSULE ORAL at 09:26

## 2024-06-25 RX ADMIN — ACETAMINOPHEN 1000 MG: 500 TABLET ORAL at 09:40

## 2024-06-25 RX ADMIN — FAMOTIDINE 20 MG: 10 INJECTION, SOLUTION INTRAVENOUS at 20:22

## 2024-06-25 RX ADMIN — ALBUTEROL SULFATE 2.5 MG: 2.5 SOLUTION RESPIRATORY (INHALATION) at 15:29

## 2024-06-25 RX ADMIN — PROPOFOL 35 MCG/KG/MIN: 10 INJECTION, EMULSION INTRAVENOUS at 03:23

## 2024-06-25 RX ADMIN — MAGNESIUM SULFATE HEPTAHYDRATE 2000 MG: 40 INJECTION, SOLUTION INTRAVENOUS at 09:34

## 2024-06-25 RX ADMIN — SODIUM CHLORIDE: 9 INJECTION, SOLUTION INTRAVENOUS at 09:32

## 2024-06-25 RX ADMIN — ACETAMINOPHEN 1000 MG: 500 TABLET ORAL at 13:00

## 2024-06-25 RX ADMIN — ALBUTEROL SULFATE 2.5 MG: 2.5 SOLUTION RESPIRATORY (INHALATION) at 12:11

## 2024-06-25 RX ADMIN — MUPIROCIN: 20 OINTMENT TOPICAL at 20:25

## 2024-06-25 RX ADMIN — MUPIROCIN: 20 OINTMENT TOPICAL at 09:26

## 2024-06-25 RX ADMIN — HYDRALAZINE HYDROCHLORIDE 5 MG: 20 INJECTION INTRAMUSCULAR; INTRAVENOUS at 10:31

## 2024-06-25 RX ADMIN — MORPHINE SULFATE 4 MG: 4 INJECTION, SOLUTION INTRAMUSCULAR; INTRAVENOUS at 08:38

## 2024-06-25 RX ADMIN — MORPHINE SULFATE 2 MG: 2 INJECTION, SOLUTION INTRAMUSCULAR; INTRAVENOUS at 20:37

## 2024-06-25 RX ADMIN — ACETAMINOPHEN 1000 MG: 500 TABLET ORAL at 01:15

## 2024-06-25 RX ADMIN — CHLORHEXIDINE GLUCONATE 15 ML: 1.2 RINSE ORAL at 09:26

## 2024-06-25 RX ADMIN — BACITRACIN: 500 OINTMENT TOPICAL at 09:26

## 2024-06-25 RX ADMIN — FAMOTIDINE 20 MG: 20 TABLET, FILM COATED ORAL at 09:35

## 2024-06-25 RX ADMIN — HYDRALAZINE HYDROCHLORIDE 5 MG: 20 INJECTION INTRAMUSCULAR; INTRAVENOUS at 16:28

## 2024-06-25 RX ADMIN — GABAPENTIN 300 MG: 300 CAPSULE ORAL at 12:25

## 2024-06-25 RX ADMIN — OXYCODONE 10 MG: 5 TABLET ORAL at 16:28

## 2024-06-25 RX ADMIN — ALBUTEROL SULFATE 2.5 MG: 2.5 SOLUTION RESPIRATORY (INHALATION) at 19:51

## 2024-06-25 RX ADMIN — Medication 10 ML: at 09:27

## 2024-06-25 RX ADMIN — ACETAMINOPHEN 1000 MG: 500 TABLET ORAL at 21:33

## 2024-06-25 RX ADMIN — PIPERACILLIN AND TAZOBACTAM 3375 MG: 3; .375 INJECTION, POWDER, LYOPHILIZED, FOR SOLUTION INTRAVENOUS at 22:18

## 2024-06-25 RX ADMIN — METHOCARBAMOL 750 MG: 750 TABLET ORAL at 12:25

## 2024-06-25 RX ADMIN — BISACODYL 5 MG: 5 TABLET, COATED ORAL at 09:26

## 2024-06-25 RX ADMIN — OXYCODONE 10 MG: 5 TABLET ORAL at 21:34

## 2024-06-25 RX ADMIN — ALBUTEROL SULFATE 2.5 MG: 2.5 SOLUTION RESPIRATORY (INHALATION) at 08:41

## 2024-06-25 ASSESSMENT — PULMONARY FUNCTION TESTS
PIF_VALUE: 20
PIF_VALUE: 22
PIF_VALUE: 22
PIF_VALUE: 19
PIF_VALUE: 39
PIF_VALUE: 25
PIF_VALUE: 13
PIF_VALUE: 36
PIF_VALUE: 21
PIF_VALUE: 30
PIF_VALUE: 21

## 2024-06-25 ASSESSMENT — PAIN SCALES - GENERAL
PAINLEVEL_OUTOF10: 3
PAINLEVEL_OUTOF10: 7
PAINLEVEL_OUTOF10: 4
PAINLEVEL_OUTOF10: 8
PAINLEVEL_OUTOF10: 4
PAINLEVEL_OUTOF10: 5
PAINLEVEL_OUTOF10: 2
PAINLEVEL_OUTOF10: 5

## 2024-06-25 ASSESSMENT — PAIN DESCRIPTION - LOCATION
LOCATION: INCISION

## 2024-06-25 ASSESSMENT — PAIN DESCRIPTION - ORIENTATION
ORIENTATION: RIGHT
ORIENTATION: RIGHT

## 2024-06-25 NOTE — PLAN OF CARE
Problem: Discharge Planning  Goal: Discharge to home or other facility with appropriate resources  Outcome: Progressing  Flowsheets (Taken 6/24/2024 2000)  Discharge to home or other facility with appropriate resources: Refer to discharge planning if patient needs post-hospital services based on physician order or complex needs related to functional status, cognitive ability or social support system     Problem: Pain  Goal: Verbalizes/displays adequate comfort level or baseline comfort level  Outcome: Progressing     Problem: Safety - Medical Restraint  Goal: Remains free of injury from restraints (Restraint for Interference with Medical Device)  Description: INTERVENTIONS:  1. Determine that other, less restrictive measures have been tried or would not be effective before applying the restraint  2. Evaluate the patient's condition at the time of restraint application  3. Inform patient/family regarding the reason for restraint  4. Q2H: Monitor safety, psychosocial status, comfort, nutrition and hydration  Outcome: Progressing

## 2024-06-25 NOTE — ADT AUTH CERT
Utilization Reviews       6/24/24 by Keysha Rivera RN   Last Updated by Keysha Rivera RN on 6/24/2024 1550     Review Status Created By   In Primary Keysha Rivera RN       Review Type   Continued Stay      Criteria Review   DATE: 6/24  TYPE OF BED: ICU           PERTINENT UPDATES:  Per CTS:  Date of Procedure: 6/24/2024     Pre-Op Diagnosis Codes:     Empyema (HCC) [J86.9]     Post-Op Diagnosis: Same       Procedure(s):  OPEN RIGHT THORACOTOMY WITH TOTAL LUNG DECORTICATION     Surgeon(s):  Emmanuel Terry MD     Assistant:   Physician Assistant: Beulah Vanessa PA-C     Anesthesia: General     Estimated Blood Loss (mL): 500     Complications: None     Specimens:   ID Type Source Tests Collected by Time Destination   1 : PLEURAL FLUID FOR TRIGLYCERIDES Body Fluid Ascitic Fluid TRIGLYCERIDES, BODY FLUIDS Emmanuel Terry MD 6/24/2024 1026     A : Pleural Peel Tissue Lung SURGICAL PATHOLOGY Emmanuel Terry MD 6/24/2024 0933            Drains:           Chest Tube Right Pleural 1 (Active)       Chest Tube Right Pleural 2 (Active)       Urinary Catheter 06/24/24 Soria-Temperature (Active)       [REMOVED] Chest Tube Right (Removed)   Chest Tube Airleak No 06/24/24 0518   Status Continuous Suction 06/24/24 0518   Suction -20 cm H2O 06/24/24 0518   Y Connector Used No 06/24/24 0518   Drainage Description Yellow;Serous 06/24/24 0518   Dressing Status Clean, dry & intact 06/24/24 0518   Chest Tube Dressing Split Gauze 06/24/24 0518   Site Assessment Clean, dry & intact 06/24/24 0518   Surrounding Skin Clean, dry & intact 06/24/24 0518   Output (ml) 50 ml 06/24/24 0518         Findings:  Infection Present At Time Of Surgery (PATOS) (choose all levels that have infection present):  - Deep Infection (muscle/fascia) present as evidenced by purulent fluid  Other Findings: 1cm thick parietal pleura     Detailed Description of Procedure:   After informed consent was obtained patient was brought to the operating  gout, HLD, HTN, CHIDI      Empyema   Mixed masha seen on stain of fluid with negative culture   Persistent though smaller amount of fluid and gas in pleural space and failure of lung to re-expand  CTS is planning R VATS on 6/24/24   He is not systemically ill    -consolidate to Zosyn to add anaerobic coverage, as empyema typically has anaerobic component   -MRSA screen was negative, feel infection w that organism unlikely - DC vanc   -encourage IS     MEDICATIONS:  Duoneb 0.5/2.5 ml BID  Pulmacort 500mcg Neb BID   Zosyn 3.375 mg IV q 8 hrs  Maxipime 2000 mg IV q 8 hrs (Dc'd ar 0643 am)  Vancomycin 1500 mg IV q8h hrs (Dc'd at 1307 pm)  Oxjsdtn26 mg SC BID  Robaxin 750mg po QID  Morphine 4 mg IV q 2hrs PRN x 1     ORDERS:  Telemetry  Droplet Isolation (Rhinovirus)  Chest tube to continuous suction  Continuous Pulse Oximetry  Home Bipap/Cpap  I&O  Up with assist  Consult Pulmonology  Consult Infectious Diseases  Consult to Cardiothoracic Surgery  Regular Diet  Oxygen (Protocol)  Vascular Duplex RUE r/o DVT  CT Chest  PT/OT/SLP/CM/RN ASSESSMENT OR NOTES:  CM /DC plan: IPTA; Lives in house with wife. Patient is scheduled for outpatient Sleep study on 6/29 at [facility].

## 2024-06-25 NOTE — PROGRESS NOTES
CVTS Thoracic Progress Note:          CC: Post op follow up    Subj: Doing well but still having some pain this AM. Asking when his chest tubes can come out and is eager to go home.    Obj: Blood pressure (!) 140/83, pulse 82, temperature 98.9 °F (37.2 °C), temperature source Axillary, resp. rate 14, height 1.854 m (6' 1\"), weight (!) 164.1 kg (361 lb 12.4 oz), SpO2 96 %.   General: Laying in bed comfortably  Cardiovascular: Sinus rhythm on monitor, HR 80's  Pulmonary: NWOB on nasal cannula  Abdomen soft and non-distended  Incision C/D/I  Chest tube with 380 cc serosanguinous drainage in last 24 hours/no airleak    Diagnostics:   CBC with Differential:    Lab Results   Component Value Date/Time    WBC 16.0 06/25/2024 06:26 AM    RBC 3.78 06/25/2024 06:26 AM    HGB 12.4 06/25/2024 08:05 AM    HCT 33.6 06/25/2024 06:26 AM     06/25/2024 06:26 AM    MCV 88.8 06/25/2024 06:26 AM    MCH 29.2 06/25/2024 06:26 AM    MCHC 32.9 06/25/2024 06:26 AM    RDW 14.0 06/25/2024 06:26 AM    BANDSPCT 3 06/20/2024 04:57 AM    METASPCT 1 06/20/2024 04:57 AM    LYMPHOPCT 16.0 06/20/2024 04:57 AM    MONOPCT 9.0 06/20/2024 04:57 AM    MYELOPCT 1 06/20/2024 04:57 AM    EOSPCT 1.0 06/20/2024 04:57 AM    BASOPCT 0.0 06/20/2024 04:57 AM    MONOSABS 0.8 06/20/2024 04:57 AM    EOSABS 0.1 06/20/2024 04:57 AM    BASOSABS 0.0 06/20/2024 04:57 AM     BMP:    Lab Results   Component Value Date/Time     06/25/2024 06:26 AM    K 4.9 06/25/2024 06:26 AM    CL 99 06/25/2024 06:26 AM    CO2 27 06/25/2024 06:26 AM    BUN 15 06/25/2024 06:26 AM    CREATININE 1.4 06/25/2024 06:26 AM    CALCIUM 8.7 06/25/2024 06:26 AM    LABGLOM 65 06/25/2024 06:26 AM    GLUCOSE 124 06/25/2024 06:26 AM     Recent Labs     06/24/24  0549 06/24/24  1301 06/24/24  1312 06/25/24  0626 06/25/24  0805   WBC 7.1  --  16.4* 16.0*  --    HGB 10.8*   < > 12.2* 11.1* 12.4*   HCT 31.7*  --  37.8* 33.6*  --      --  352 380  --     < > = values in this interval not  displayed.                                                                  Recent Labs     06/24/24  1312 06/25/24  0626    136   K 4.7 4.9    99   CO2 27 27   BUN 11 15   CREATININE 1.1 1.4*   GLUCOSE 135* 124*   PHOS  --  4.9          Recent Labs     06/25/24  0626   MG 1.90      No results for input(s): \"TROPONINI\" in the last 72 hours.  No results for input(s): \"INR\" in the last 72 hours.    6/25 CXR:  FINDINGS:  Lines and tubes are stable. Bibasilar atelectasis with small effusions,  stable. No pneumothorax. No significant change in the appearance of the  lungs. Cardiomediastinal silhouette and bony thorax are unchanged.     IMPRESSION:  Stable examination with bibasilar atelectasis.  Stable lines and tubes.    Assess/Plan:   POD #1 s/p open right thoracotomy with total lung decortication    - Proceed with extubation and ADAT  - Maintain chest tube to suction. Re-examine output amount & consistency after diet advanced.  - Add on Gabapentin & Robaxin to optimize pain control  - PRN hydralazine for BP control  - Cr 1.4 this AM. Begin IVF at 75 cc/hr  __________________________________________________________  SCIP Measures:   Soria catheter for: ? Accurate I/O  Antibiotics: ? Have been stopped  ____________________________________________________    Karlene Zaragoza PA-C  6/25/2024  9:50 AM

## 2024-06-25 NOTE — PROGRESS NOTES
Occupational Therapy  Facility/Department: [facility] C2 CARD TELEMETRY  Occupational Therapy Initial Assessment and Treatment Note    Name: Ángel Stauffer  : 1985  MRN: [id number]  Date of Service: 2024    Discharge Recommendations:  Continue to assess pending progress, IP Rehab, 24 hour supervision or assist  OT Equipment Recommendations  Equipment Needed:  (CTA)     Therapy discharge recommendations are subject to collaboration from the patient’s interdisciplinary healthcare team, including MD and case management recommendations.    Barriers to Home Discharge:   [x] Steps to access home entry or bed/bath:   [x] Unable to transfer, ambulate, or propel wheelchair household distances without assist   [] Limited available assist at home upon discharge    [] Patient or family requests d/c to post-acute facility    [] Poor cognition/safety awareness for d/c to home alone    [] Unable to maintain ordered weight bearing status    [] Patient with salient signs of long-standing immobility   [x] Decreased independence with ADLs   [x] Increased risk for falls   [] Other:    If pt is unable to be seen after this session, please let this note serve as discharge summary.  Please see case management note for discharge disposition.  Thank you.    Patient Diagnosis(es): The primary encounter diagnosis was Right arm pain. A diagnosis of Empyema (HCC) was also pertinent to this visit.  Past Medical History:  has a past medical history of COPD (chronic obstructive pulmonary disease) (HCC), Elevated LFTs, GERD (gastroesophageal reflux disease), Gout, HLD (hyperlipidemia), HTN (hypertension), CHIDI (obstructive sleep apnea), and Vitamin D deficiency.  Past Surgical History:  has a past surgical history that includes Cholecystectomy; other surgical history (Left, ); CT GUIDED CHEST TUBE (2024); and thoracotomy (Right, 2024).       Assessment   Performance deficits / Impairments: Decreased functional mobility ;Decreased

## 2024-06-25 NOTE — PROGRESS NOTES
Shift: [id number]    Procedure:     OPEN RIGHT THORACOTOMY WITH TOTAL LUNG DECORTICATION     Surgeon(s):  Emmanuel Terry MD     Assistant:   Physician Assistant: Beulah Vanessa PA-C     Anesthesia: General     Estimated Blood Loss (mL): 500     Complications: None    Admit from OR (time and date): 6/24/2024 @ 1200      Surgery, return to OR no     Nursing assessment at handoff  stable    Most recent vitals: BP (!) 93/48   Pulse 80   Temp 98.4 °F (36.9 °C) (Bladder)   Resp 10   Ht 1.854 m (6' 1\")   Wt (!) 164.8 kg (363 lb 6.4 oz)   SpO2 96%   BMI 47.94 kg/m²      Increased O2 requirements: no O2 requirements: Oxygen Therapy  SpO2: 96 %  Pulse Oximetry Type: Continuous  Pulse Oximeter Device Mode: Continuous  Pulse Oximeter Device Location: Finger  O2 Device: Ventilator  Oximetry Probe Site Changed: Yes  Skin Assessment: Clean, dry, & intact  Skin Protection for O2 Device: Yes  Orientation: Middle  Location: Nose  Intervention(s): Skin Barrier  FiO2 : 40 %  Vent Mode: AC/VC     Admission weight Weight - Scale: (!) 166.2 kg (366 lb 4.8 oz)  Today's weight   Wt Readings from Last 1 Encounters:   06/24/24 (!) 164.8 kg (363 lb 6.4 oz)           Drop in Urinary Output no     Rhythm Changes Normal Sinus Rhythm      Lines/Drains  LDA Insertion Date Discontinued Date Dressing Changes   Art line      Central Line      Soria 6/24/24     Chest Tube 6/24/24     Wires       ETT 6/24/24     GABRIEL Drain      VasCath      Impella        Interventions After Office Hours  Problem(Brief) Date Time Intervention Physician contacted                                               Drip rates at handoff:    sodium chloride      fentaNYL 150 mcg/hr (06/24/24 9406)    propofol 35 mcg/kg/min (06/25/24 0105)       Hospital Course:  POD#0 Days:   -Patient out of OR at 1200  -Remained hemodynamically stable throughout shift   -Will remain mechanically ventilated today per Dr. Terry   -Patients wife at bedside and updated on plan of care      POD #0 Nights 6/25  -no acute event  -still on ventilator  -SAT done       Lab Data:  CBC:   Recent Labs     06/24/24  0549 06/24/24  1301 06/24/24  1312   WBC 7.1  --  16.4*   HGB 10.8* 13.8 12.2*   HCT 31.7*  --  37.8*   MCV 88.9  --  89.7     --  352       BMP:    Recent Labs     06/22/24  0559 06/24/24  1312   * 137   K 4.0 4.7   CO2 28 27   BUN 14 11   CREATININE 0.9 1.1       LIVR: No results for input(s): \"AST\", \"ALT\" in the last 72 hours.  PT/INR: No results for input(s): \"PROT\", \"INR\" in the last 72 hours.  APTT: No results for input(s): \"APTT\" in the last 72 hours.  ABG:   Recent Labs     06/24/24  1301   PHART 7.351   [id number] 53.8*   PO2ART 74.6*        Electronically signed by Noni Cunningham RN on 6/25/2024 at 2:12 AM

## 2024-06-25 NOTE — PROGRESS NOTES
Spontaneous Awakening Trial    Patient meets criteria for spontaneous awakening trial. Started at 0633 hours.     Sedation is currently off.     Current RASS score is RASS 0 (Alert and Calm).     Safety assessed. Restraints continued.

## 2024-06-25 NOTE — PROGRESS NOTES
Hospital Medicine Progress Note      Date of Admission: 6/20/2024  Hospital Day: 6    Chief Admission Complaint:  empyema     Subjective:  no new c/o    Presenting Admission History:         Ángel Stauffer is a 39 y.o. male with pmh of essential hypertension, who was initially admitted to [facility] on 6/13/2024, with persistent fever up to 102 °F, headache, cough, dizziness; was reportedly discharged 3 weeks prior on levofloxacin and azithromycin for pneumonia; on this admission initially found to have small pleural effusion.  Pleural effusion continued to appear to worsen on serial x-rays and was evaluated with bedside ultrasound by pulmonology team-found to have moderate loculated fluid.  CT-guided chest tube placed on 6/18/2024 by IR team -immediate return of tan purulent fluid.  MIST protocol started by pulmonology team with intrapleural instillation of tPA/Pulmozyme-patient received 3 doses 12 hours apart, last instillation on 6/20 at 7am.  Patient was transferred to [facility] for evaluation for VATS with cardiothoracic surgery.        Assessment/Plan:      Current Principal Problem:  Empyema lung (HCC)      Empyema, pneumonia  - CT surgery consulted s/p open Rthoracotomy w/ total lung decortication 24 June w/out complications.     - ID consulted  - continue zosyn     Rhinovirus  - supportive care     COPD - w/out chronic hypoxic respiratory failure on no baseline home O2.  Controlled on home medication regimen - continued.      HTN - w/out known CAD and no evidence of active signs/sxs of ischemia/failure. Currently controlled on home meds w/ vitals documented and reviewed.    Anemia - etiology clinically unable to determine, w/out evidence of active bleeding/hemolysis. Asymptomatic w/out indication for transfusion. Follow serial labs - documented and reviewed.     Gout - controlled on home Prednisone     Obesity -  With Body mass index is 47.73 kg/m².       [] Class I - 30.0 to 34.9 kg/m2  [] Class

## 2024-06-25 NOTE — PROGRESS NOTES
Anticipated Discharge Disposition: TBD    Action: Received forwarded email from previous RN CM : I'm hoping you're still working with ROLF who we previously discussed. He told me he has no phone and there is no way to contact his mother as she has no phone either. If you see them could you tell them that I will need something in writing regarding the cost of the apartment he's moving in to so we can get a check cut? I am not here tomorrow but will be in on Monday. Thanks so much, have a great day!    Per previous notes, pt no longer qualifies for group home placement through [facility].     Met with pt at bedside. Pt states his mother lives in [place]. Pt states he has been able to reach mother at [phone]. Pt states mother needs to get things ready for him to come home and needs a few days. Pt states SW is able to reach out to mother to coordinate d/c.     Called Leslie at [phone] and left  requesting return call.     Barriers to Discharge: Housing    Plan: Care coordination will continue to follow and assist with discharge planning    Patient meets criteria for extubation and Dr. Terry made aware. Patient extubated by RN to 4 L O2 via nasal cannula @ 0815. RT aware. SPO2 sustaining at 98%. Will monitor.

## 2024-06-25 NOTE — PROGRESS NOTES
Physical Therapy  Facility/Department: [facility] C2 CARD TELEMETRY  Physical Therapy Initial Assessment    Name: Ángel Stauffer  : 1985  MRN: [id number]  Date of Service: 2024    Discharge Recommendations:  Continue to assess pending progress, 24 hour supervision or assist, IP Rehab   PT Equipment Recommendations  Other: CTA    Therapy discharge recommendations take into account each patient's current medical complexities and are subject to input/oversight from the patient's healthcare team.   Barriers to Home Discharge:   [x] Steps to access home entry or bed/bath:   [x] Unable to transfer, ambulate, or propel wheelchair household distances without assist   [x] Limited available assist at home upon discharge    [] Patient or family requests d/c to post-acute facility    [] Poor cognition/safety awareness for d/c to home alone    []Unable to maintain ordered weight bearing status    [] Patient with salient signs of long-standing immobility   [] Patient is at risk for falls due to:   [] Other:    If pt is unable to be seen after this session, please let this note serve as discharge summary.  Please see case management note for discharge disposition.  Thank you.    Patient Diagnosis(es): The primary encounter diagnosis was Right arm pain. A diagnosis of Empyema (HCC) was also pertinent to this visit.  Past Medical History:  has a past medical history of COPD (chronic obstructive pulmonary disease) (HCC), Elevated LFTs, GERD (gastroesophageal reflux disease), Gout, HLD (hyperlipidemia), HTN (hypertension), CHIDI (obstructive sleep apnea), and Vitamin D deficiency.  Past Surgical History:  has a past surgical history that includes Cholecystectomy; other surgical history (Left, ); CT GUIDED CHEST TUBE (2024); and thoracotomy (Right, 2024).    Assessment   Body Structures, Functions, Activity Limitations Requiring Skilled Therapeutic Intervention: Decreased functional mobility ;Decreased

## 2024-06-25 NOTE — PROGRESS NOTES
06/25/24 0352   Patient Observation   Pulse 82   Respirations 16   SpO2 97 %   Breath Sounds   Right Upper Lobe Diminished   Right Middle Lobe Diminished   Right Lower Lobe Diminished   Left Upper Lobe Diminished   Left Lower Lobe Diminished   Vent Information   Vent Mode AC/VC   Ventilator Settings   Vt (Set, mL) 520 mL   Resp Rate (Set) 18 bpm   PEEP/CPAP (cmH2O) 5   FiO2  40 %   Vent Patient Data (Readings)   Vt (Measured) 650 mL   Peak Inspiratory Pressure (cmH2O) 19 cmH2O   Rate Measured 22 br/min   Minute Volume (L/min) 12.1 Liters   Mean Airway Pressure (cmH2O) 12 cmH20   Plateau Pressure (cm H2O) 0 cm H2O   Driving Pressure -5   I:E Ratio 1:1.90   Vent Alarm Settings   High Pressure (cmH2O) 40 cmH2O   Low Minute Volume (lpm) 2 L/min   High Minute Volume (lpm) 20 L/min   Low Exhaled Vt (ml) 200 mL   RR High (bpm) 40 br/min   Additional Respiratoray Assessments   Humidification Source HME   Airway Clearance   Suction ET Tube   Suction Device Inline suction catheter   Sputum Method Obtained Endotracheal   Sputum Amount Moderate   Sputum Color/Odor Bloody   Sputum Consistency Thin

## 2024-06-25 NOTE — PROGRESS NOTES
06/24/24 2348   Patient Observation   Pulse 90   Respirations 13   SpO2 96 %   Breath Sounds   Right Upper Lobe Diminished   Right Middle Lobe Diminished   Right Lower Lobe Diminished   Left Upper Lobe Diminished   Left Lower Lobe Diminished   Vent Information   Vent Mode AC/VC   Ventilator Settings   Vt (Set, mL) 520 mL   Resp Rate (Set) 18 bpm   PEEP/CPAP (cmH2O) 5   FiO2  40 %   Vent Patient Data (Readings)   Vt (Measured) 0 mL   Peak Inspiratory Pressure (cmH2O) 40 cmH2O   Rate Measured 109 br/min   Minute Volume (L/min) 0.3 Liters   Mean Airway Pressure (cmH2O) 14 cmH20   Plateau Pressure (cm H2O) 0 cm H2O   Driving Pressure -5   I:E Ratio 1.20:1   Vent Alarm Settings   High Pressure (cmH2O) 40 cmH2O   Low Minute Volume (lpm) 2 L/min   High Minute Volume (lpm) 20 L/min   Low Exhaled Vt (ml) 200 mL   High Exhaled Vt (ml) 1100 mL   RR High (bpm) 40 br/min   Additional Respiratoray Assessments   Humidification Source HME   Airway Clearance   Suction ET Tube   Suction Device Inline suction catheter   Sputum Method Obtained Endotracheal   Sputum Amount Moderate   Sputum Color/Odor Bloody   Sputum Consistency Thin   ETT    Placement Date/Time: 06/24/24 0803   Present on Admission/Arrival: No  Placed By: In surgery  Placement Verified By: Direct visualization;Auscultation;Capnometry  Preoxygenation: No  Mask Ventilation: Ventilated by mask (1)  Technique: Video laryngosc...   Secured At 24 cm   Measured From Lips   ETT Placement Center   Secured By Commercial tube fregoso   Site Assessment Dry

## 2024-06-25 NOTE — PROGRESS NOTES
PULMONARY AND CRITICAL CARE INPATIENT NOTE        Ángel Stauffer   : 1985  MRN: [id number]     Admitting Physician: Amando Sebastian MD  Attending Physician: Amando Sebastian MD  PCP: Eddie Calvillo DO    Admission: 2024   Date of Service: 2024    No chief complaint on file.          ASSESSMENT & PLAN       39 y.o. pleasant  male patient with:    Assessment:  Complicated right parapneumonic effusion/empyema.  S/p tPA/dornase installations and chest tube.  S/p VATS/decortication on 2024  Rhinovirus respiratory infection  COPD  CHIDI  Morbid obesity      Plan:              Passed SAT/SBT and extubated successfully this morning  Passed swallow> start diet   for 12 hours  Keep infante today  Follow-up on cultures and consider broad-spectrum antibiotics if patient spikes fever  Chest tube management per CT surgery  Pain control  IS/Flutter valve  Delirium precautions.  Continue to wean oxygen with target 90 to 94%.  Target Hb >7, Platelets>50 unless specified otherwise.  Bowel regimen: Notify provider if no bowel movement for 48 hrs.  PT/OT when patient is able to participate.  Alert provider about unnecessary catheters or tubes or with early signs of inflammation to discontinue.      LOS: Hospital Day: 1    Code:Full Code      Time spent on this patient, excluding procedures time, is ~35 minutes.  Time was spent on reviewing the EMR, reviewing overnight events, interpreting labs and imaging, interviewing and directly examining the patient, ventilator/breathing assistance device/drip review and adjustment, running multidisciplinary rounds and coordinating critical care plan with bedside nurse, and other care providers.          Subjective/Objective             Summary:   59-year-old male patient with HTN with recent admission for pneumonia and small pleural effusion with admitted with persistent fever, headache and cough.    Interval history/Encounter 2024   Patient remained afebrile and  hemodynamically stable with blood pressure on the higher side.  Remained on fentanyl and propofol for sedation and mechanically ventilated with FiO2 40%.  I's and O's +400 cc with urine output of 1800 cc  Patient remained on Tylenol, Proventil, bowel regimen, Pepcid, famotidine  Blood sugar remained within range  No new positive micro results  Labs showed worsening kidney function from 1.9-1 at baseline up to 1.4  CBC showed mild leukocytosis  Chest x-ray showed stable exam with bibasilar atelectasis with stable lines and tubes      Objective    Test Results:  Imaging:  I have reviewed radiology images personally.    XR CHEST PORTABLE    Result Date: 6/24/2024  Persistent trace right pleural effusion and right basilar atelectasis or airspace disease.     XR CHEST PORTABLE    Result Date: 6/23/2024  No significant interval change in small right pleural effusion and right basilar atelectasis/airspace disease as compared to prior.        Chest x-ray June 25, 2024  Stable exam with bibasilar atelectasis    PFTS:  NA      Cardiology:      Sleep:  NA      Physical Exam:  General appearance: Critically ill on mechanical ventilation  HEENT: ETT in place.    Cardiac: No murmur  Lungs: Decreased air entry on the right side with chest tubes in place  Abdomen: Soft.  Morbid obesity  Skin, Back & Extremities: No rash.  Neurological: No focal or lateralizing signs.     ________________________________________________________  Electronically signed by:  Karrie Belcher MD,FACP    6/24/2024    12:21 PM.     [facility]  [address], [place] [address]   Phone (office): [phone]   Box [

## 2024-06-26 ENCOUNTER — APPOINTMENT (OUTPATIENT)
Dept: GENERAL RADIOLOGY | Age: 39
End: 2024-06-26
Attending: INTERNAL MEDICINE
Payer: COMMERCIAL

## 2024-06-26 LAB
ANION GAP SERPL CALCULATED.3IONS-SCNC: 7 MMOL/L (ref 3–16)
BUN SERPL-MCNC: 14 MG/DL (ref 7–20)
CALCIUM SERPL-MCNC: 8.2 MG/DL (ref 8.3–10.6)
CHLORIDE SERPL-SCNC: 98 MMOL/L (ref 99–110)
CO2 SERPL-SCNC: 27 MMOL/L (ref 21–32)
CREAT SERPL-MCNC: 1.3 MG/DL (ref 0.9–1.3)
DEPRECATED RDW RBC AUTO: 14.4 % (ref 12.4–15.4)
GFR SERPLBLD CREATININE-BSD FMLA CKD-EPI: 72 ML/MIN/{1.73_M2}
GLUCOSE SERPL-MCNC: 103 MG/DL (ref 70–99)
HCT VFR BLD AUTO: 30.9 % (ref 40.5–52.5)
HGB BLD-MCNC: 10.3 G/DL (ref 13.5–17.5)
MAGNESIUM SERPL-MCNC: 2 MG/DL (ref 1.8–2.4)
MCH RBC QN AUTO: 30.1 PG (ref 26–34)
MCHC RBC AUTO-ENTMCNC: 33.5 G/DL (ref 31–36)
MCV RBC AUTO: 89.8 FL (ref 80–100)
PHOSPHATE SERPL-MCNC: 4.2 MG/DL (ref 2.5–4.9)
PLATELET # BLD AUTO: 328 K/UL (ref 135–450)
PMV BLD AUTO: 7.1 FL (ref 5–10.5)
POTASSIUM SERPL-SCNC: 4.6 MMOL/L (ref 3.5–5.1)
RBC # BLD AUTO: 3.44 M/UL (ref 4.2–5.9)
SODIUM SERPL-SCNC: 132 MMOL/L (ref 136–145)
WBC # BLD AUTO: 15.1 K/UL (ref 4–11)

## 2024-06-26 PROCEDURE — 80048 BASIC METABOLIC PNL TOTAL CA: CPT

## 2024-06-26 PROCEDURE — 6360000002 HC RX W HCPCS

## 2024-06-26 PROCEDURE — 97110 THERAPEUTIC EXERCISES: CPT

## 2024-06-26 PROCEDURE — 6360000002 HC RX W HCPCS: Performed by: INTERNAL MEDICINE

## 2024-06-26 PROCEDURE — 99232 SBSQ HOSP IP/OBS MODERATE 35: CPT | Performed by: INTERNAL MEDICINE

## 2024-06-26 PROCEDURE — 5A09357 ASSISTANCE WITH RESPIRATORY VENTILATION, LESS THAN 24 CONSECUTIVE HOURS, CONTINUOUS POSITIVE AIRWAY PRESSURE: ICD-10-PCS | Performed by: STUDENT IN AN ORGANIZED HEALTH CARE EDUCATION/TRAINING PROGRAM

## 2024-06-26 PROCEDURE — 71045 X-RAY EXAM CHEST 1 VIEW: CPT

## 2024-06-26 PROCEDURE — 2580000003 HC RX 258: Performed by: INTERNAL MEDICINE

## 2024-06-26 PROCEDURE — 97116 GAIT TRAINING THERAPY: CPT

## 2024-06-26 PROCEDURE — 94761 N-INVAS EAR/PLS OXIMETRY MLT: CPT

## 2024-06-26 PROCEDURE — 83735 ASSAY OF MAGNESIUM: CPT

## 2024-06-26 PROCEDURE — 6370000000 HC RX 637 (ALT 250 FOR IP)

## 2024-06-26 PROCEDURE — 2580000003 HC RX 258: Performed by: STUDENT IN AN ORGANIZED HEALTH CARE EDUCATION/TRAINING PROGRAM

## 2024-06-26 PROCEDURE — 94660 CPAP INITIATION&MGMT: CPT

## 2024-06-26 PROCEDURE — 2580000003 HC RX 258

## 2024-06-26 PROCEDURE — 2000000000 HC ICU R&B

## 2024-06-26 PROCEDURE — 94669 MECHANICAL CHEST WALL OSCILL: CPT

## 2024-06-26 PROCEDURE — 94640 AIRWAY INHALATION TREATMENT: CPT

## 2024-06-26 PROCEDURE — 99233 SBSQ HOSP IP/OBS HIGH 50: CPT | Performed by: INTERNAL MEDICINE

## 2024-06-26 PROCEDURE — 85027 COMPLETE CBC AUTOMATED: CPT

## 2024-06-26 PROCEDURE — 2700000000 HC OXYGEN THERAPY PER DAY

## 2024-06-26 PROCEDURE — 6360000002 HC RX W HCPCS: Performed by: STUDENT IN AN ORGANIZED HEALTH CARE EDUCATION/TRAINING PROGRAM

## 2024-06-26 PROCEDURE — 84100 ASSAY OF PHOSPHORUS: CPT

## 2024-06-26 PROCEDURE — 97530 THERAPEUTIC ACTIVITIES: CPT

## 2024-06-26 PROCEDURE — 6360000002 HC RX W HCPCS: Performed by: NURSE PRACTITIONER

## 2024-06-26 RX ORDER — ALBUTEROL SULFATE 90 UG/1
1 AEROSOL, METERED RESPIRATORY (INHALATION) EVERY 6 HOURS PRN
COMMUNITY

## 2024-06-26 RX ORDER — ACETYLCYSTEINE 200 MG/ML
600 SOLUTION ORAL; RESPIRATORY (INHALATION)
Status: DISCONTINUED | OUTPATIENT
Start: 2024-06-26 | End: 2024-06-28 | Stop reason: HOSPADM

## 2024-06-26 RX ORDER — ALBUTEROL SULFATE 2.5 MG/3ML
2.5 SOLUTION RESPIRATORY (INHALATION)
Status: DISCONTINUED | OUTPATIENT
Start: 2024-06-26 | End: 2024-06-28 | Stop reason: HOSPADM

## 2024-06-26 RX ADMIN — FAMOTIDINE 20 MG: 20 TABLET, FILM COATED ORAL at 09:01

## 2024-06-26 RX ADMIN — SODIUM CHLORIDE 5 MG/HR: 9 INJECTION, SOLUTION INTRAVENOUS at 22:55

## 2024-06-26 RX ADMIN — GABAPENTIN 300 MG: 300 CAPSULE ORAL at 09:00

## 2024-06-26 RX ADMIN — GABAPENTIN 300 MG: 300 CAPSULE ORAL at 20:14

## 2024-06-26 RX ADMIN — BISACODYL 5 MG: 5 TABLET, COATED ORAL at 09:01

## 2024-06-26 RX ADMIN — Medication 10 ML: at 09:02

## 2024-06-26 RX ADMIN — ALBUTEROL SULFATE 2.5 MG: 2.5 SOLUTION RESPIRATORY (INHALATION) at 08:05

## 2024-06-26 RX ADMIN — METHOCARBAMOL 750 MG: 750 TABLET ORAL at 20:14

## 2024-06-26 RX ADMIN — ALBUTEROL SULFATE 2.5 MG: 2.5 SOLUTION RESPIRATORY (INHALATION) at 11:32

## 2024-06-26 RX ADMIN — OXYCODONE 10 MG: 5 TABLET ORAL at 09:01

## 2024-06-26 RX ADMIN — BACITRACIN: 500 OINTMENT TOPICAL at 09:02

## 2024-06-26 RX ADMIN — FAMOTIDINE 20 MG: 20 TABLET, FILM COATED ORAL at 20:14

## 2024-06-26 RX ADMIN — PIPERACILLIN AND TAZOBACTAM 3375 MG: 3; .375 INJECTION, POWDER, LYOPHILIZED, FOR SOLUTION INTRAVENOUS at 13:26

## 2024-06-26 RX ADMIN — METHOCARBAMOL 750 MG: 750 TABLET ORAL at 13:22

## 2024-06-26 RX ADMIN — GABAPENTIN 300 MG: 300 CAPSULE ORAL at 13:22

## 2024-06-26 RX ADMIN — ALBUTEROL SULFATE 2.5 MG: 2.5 SOLUTION RESPIRATORY (INHALATION) at 19:28

## 2024-06-26 RX ADMIN — ACETYLCYSTEINE 600 MG: 200 INHALANT RESPIRATORY (INHALATION) at 19:28

## 2024-06-26 RX ADMIN — POLYETHYLENE GLYCOL 3350 17 G: 17 POWDER, FOR SOLUTION ORAL at 09:00

## 2024-06-26 RX ADMIN — ALBUTEROL SULFATE 2.5 MG: 2.5 SOLUTION RESPIRATORY (INHALATION) at 16:00

## 2024-06-26 RX ADMIN — MUPIROCIN: 20 OINTMENT TOPICAL at 09:02

## 2024-06-26 RX ADMIN — HYDRALAZINE HYDROCHLORIDE 5 MG: 20 INJECTION INTRAMUSCULAR; INTRAVENOUS at 20:33

## 2024-06-26 RX ADMIN — PIPERACILLIN AND TAZOBACTAM 4500 MG: 4; .5 INJECTION, POWDER, LYOPHILIZED, FOR SOLUTION INTRAVENOUS at 21:36

## 2024-06-26 RX ADMIN — ACETYLCYSTEINE 600 MG: 200 INHALANT RESPIRATORY (INHALATION) at 11:32

## 2024-06-26 RX ADMIN — MORPHINE SULFATE 4 MG: 4 INJECTION, SOLUTION INTRAMUSCULAR; INTRAVENOUS at 19:26

## 2024-06-26 RX ADMIN — ACETAMINOPHEN 1000 MG: 500 TABLET ORAL at 03:24

## 2024-06-26 RX ADMIN — HYDRALAZINE HYDROCHLORIDE 5 MG: 20 INJECTION INTRAMUSCULAR; INTRAVENOUS at 13:24

## 2024-06-26 RX ADMIN — ACETAMINOPHEN 1000 MG: 500 TABLET ORAL at 13:22

## 2024-06-26 RX ADMIN — OXYCODONE 5 MG: 5 TABLET ORAL at 03:24

## 2024-06-26 RX ADMIN — PIPERACILLIN AND TAZOBACTAM 3375 MG: 3; .375 INJECTION, POWDER, LYOPHILIZED, FOR SOLUTION INTRAVENOUS at 05:26

## 2024-06-26 RX ADMIN — ACETAMINOPHEN 1000 MG: 500 TABLET ORAL at 20:16

## 2024-06-26 RX ADMIN — ALBUTEROL SULFATE 2.5 MG: 2.5 SOLUTION RESPIRATORY (INHALATION) at 23:42

## 2024-06-26 RX ADMIN — MUPIROCIN: 20 OINTMENT TOPICAL at 20:18

## 2024-06-26 ASSESSMENT — PAIN SCALES - GENERAL
PAINLEVEL_OUTOF10: 2
PAINLEVEL_OUTOF10: 4
PAINLEVEL_OUTOF10: 1
PAINLEVEL_OUTOF10: 8
PAINLEVEL_OUTOF10: 9
PAINLEVEL_OUTOF10: 0
PAINLEVEL_OUTOF10: 8
PAINLEVEL_OUTOF10: 8
PAINLEVEL_OUTOF10: 2
PAINLEVEL_OUTOF10: 3

## 2024-06-26 ASSESSMENT — PAIN DESCRIPTION - LOCATION
LOCATION: CHEST

## 2024-06-26 ASSESSMENT — PAIN DESCRIPTION - DESCRIPTORS
DESCRIPTORS: ACHING;DISCOMFORT
DESCRIPTORS: DISCOMFORT;PRESSURE

## 2024-06-26 ASSESSMENT — PAIN DESCRIPTION - ORIENTATION
ORIENTATION: RIGHT
ORIENTATION: RIGHT

## 2024-06-26 ASSESSMENT — PAIN DESCRIPTION - PAIN TYPE: TYPE: SURGICAL PAIN

## 2024-06-26 ASSESSMENT — PAIN - FUNCTIONAL ASSESSMENT: PAIN_FUNCTIONAL_ASSESSMENT: ACTIVITIES ARE NOT PREVENTED

## 2024-06-26 NOTE — PROGRESS NOTES
ID Follow-up NOTE    CC:   Lung empyema   Antibiotics: pip-tazo    Admit Date: 6/20/2024  Hospital Day: 7    Subjective:     Patient denies any new complaints wants to go home, pain at CT sites. No fever. Mother and son at bedside.       Objective:     Patient Vitals for the past 8 hrs:   BP Temp Temp src Pulse Resp SpO2 Weight   06/26/24 1132 -- -- -- 99 12 95 % --   06/26/24 0805 -- -- -- 84 16 94 % --   06/26/24 0700 137/80 -- -- 90 10 96 % --   06/26/24 0615 (!) 149/85 98.7 °F (37.1 °C) Oral 90 20 92 % --   06/26/24 0600 -- -- -- -- -- -- (!) 167.1 kg (368 lb 6.2 oz)   06/26/24 0500 132/79 -- -- 92 19 -- --   06/26/24 0421 -- -- -- 94 18 97 % --   06/26/24 0400 138/80 -- -- 91 18 -- --   06/26/24 0354 -- -- -- -- 15 -- --       I/O last 3 completed shifts:  In: 4935.3 [P.O.:2460; I.V.:2288.5; NG/GT:80; IV Piggyback:106.7]  Out: 3625 [Urine:3275; Chest Tube:350]  I/O this shift:  In: 112.4 [I.V.:100; IV Piggyback:12.3]  Out: -     EXAM:  GENERAL: No apparent distress.    HEENT: Membranes moist, no oral lesion  NECK:  Supple, no lymphadenopathy  LUNGS: Diminished in right bases, two right sided chest tubes with serosanginous output. On NC O2  CARDIAC: RRR, no murmur appreciated  ABD:  + BS, soft / NT, Soria in place with clear yellow urine.   EXT:  No rash, no edema, no lesions  NEURO: No focal neurologic findings  PSYCH: Orientation, sensorium, mood normal  LINES:  Peripheral iv       Data Review:  Lab Results   Component Value Date    WBC 15.1 (H) 06/26/2024    HGB 10.3 (L) 06/26/2024    HCT 30.9 (L) 06/26/2024    MCV 89.8 06/26/2024     06/26/2024     Lab Results   Component Value Date    CREATININE 1.3 06/26/2024    BUN 14 06/26/2024     (L) 06/26/2024    K 4.6 06/26/2024    CL 98 (L) 06/26/2024    CO2 27 06/26/2024       Hepatic Function Panel: No results found for: \"ALKPHOS\", \"ALT\", \"AST\", \"PROT\", \"BILITOT\", \"BILIDIR\", \"IBILI\", \"LABALBU\"    MICRO:  6/13     BC x2 neg               MRSA screen

## 2024-06-26 NOTE — PLAN OF CARE
Problem: Discharge Planning  Goal: Discharge to home or other facility with appropriate resources  Outcome: Progressing     Problem: Pain  Goal: Verbalizes/displays adequate comfort level or baseline comfort level  Outcome: Progressing     Problem: Nutrition Deficit:  Goal: Optimize nutritional status  Outcome: Progressing     Problem: Safety - Adult  Goal: Free from fall injury  Outcome: Progressing     Problem: Skin/Tissue Integrity  Goal: Absence of new skin breakdown  Description: 1.  Monitor for areas of redness and/or skin breakdown  2.  Assess vascular access sites hourly  3.  Every 4-6 hours minimum:  Change oxygen saturation probe site  4.  Every 4-6 hours:  If on nasal continuous positive airway pressure, respiratory therapy assess nares and determine need for appliance change or resting period.  Outcome: Progressing

## 2024-06-26 NOTE — PROGRESS NOTES
06/26/24 0003   NIV Type   NIV Started/Stopped On   Equipment Type V60   Mode Bilevel   Mask Type Full face mask   Mask Size Large   Assessment   Pulse (!) 101   Respirations 19   /83   SpO2 97 %   Comfort Level Good   Using Accessory Muscles No   Mask Compliance Good   Skin Assessment Clean, dry, & intact   Skin Protection for O2 Device Yes   Location Nose   Intervention(s) Skin Barrier   Breath Sounds   Right Upper Lobe Diminished   Right Middle Lobe Diminished   Right Lower Lobe Diminished   Left Upper Lobe Diminished   Left Lower Lobe Diminished   Settings/Measurements   IPAP 12 cmH20   CPAP/EPAP 6 cmH2O   Vt (Measured) 448 mL   Rate Ordered 12   FiO2  30 %   Minute Volume (L/min) 11.5 Liters   Mask Leak (lpm) 0 lpm   Patient's Home Machine No   Alarm Settings   Alarms On Y

## 2024-06-26 NOTE — PROGRESS NOTES
Hospital Medicine Progress Note      Date of Admission: 6/20/2024  Hospital Day: 7    Chief Admission Complaint:  empyema     Subjective:  no new c/o    Presenting Admission History:         Ángel Stauffer is a 39 y.o. male with pmh of essential hypertension, who was initially admitted to [facility] on 6/13/2024, with persistent fever up to 102 °F, headache, cough, dizziness; was reportedly discharged 3 weeks prior on levofloxacin and azithromycin for pneumonia; on this admission initially found to have small pleural effusion.  Pleural effusion continued to appear to worsen on serial x-rays and was evaluated with bedside ultrasound by pulmonology team-found to have moderate loculated fluid.  CT-guided chest tube placed on 6/18/2024 by IR team -immediate return of tan purulent fluid.  MIST protocol started by pulmonology team with intrapleural instillation of tPA/Pulmozyme-patient received 3 doses 12 hours apart, last instillation on 6/20 at 7am.  Patient was transferred to [facility] for evaluation for VATS with cardiothoracic surgery.        Assessment/Plan:      Current Principal Problem:  Empyema lung (HCC)      Empyema, pneumonia  - CT surgery consulted s/p open Rthoracotomy w/ total lung decortication 24 June w/out complications.     - ID consulted  - continue zosyn     Rhinovirus  - supportive care     COPD - w/out chronic hypoxic respiratory failure on no baseline home O2.  Controlled on home medication regimen - continued.      HTN - w/out known CAD and no evidence of active signs/sxs of ischemia/failure. Currently controlled on home meds w/ vitals documented and reviewed.    Anemia - etiology clinically unable to determine, w/out evidence of active bleeding/hemolysis. Asymptomatic w/out indication for transfusion. Follow serial labs - documented and reviewed.     Gout - controlled on home Prednisone     Obesity -  With Body mass index is 48.6 kg/m².       [] Class I - 30.0 to 34.9 kg/m2  [] Class  II - 35.0 to 39.9 kg/m2  [x] Class III - ?40 kg/m2 (AKA severe/morbid/massive)   [] Super Obesity  - > 50 kg/m2  [] Super Super Obesity  - > 60 kg/m2    Complicating assessment and treatment. Placing patient at risk for multiple co-morbidities as well as early death and contributing to the patient's presentation.    CHIDI - likely 2nd to obesity.  Controlled on home CPAP/BiPap - continued, w/ outpatient f/u as previously arranged.       Physical Exam Performed:      General appearance:  No apparent distress  Respiratory:  Normal respiratory effort.   Cardiovascular:  Regular rate and rhythm.  Abdomen:  Soft, non-tender, non-distended.  Musculoskelatal:  No edema  Neurologic:  Non-focal  Psychiatric:  Alert and oriented    Telemetry monitoring - Personally reviewed and interpreted telemetry on 26 June as ordered with the following findings      [x] NSR  [] Sinus Tachycardia  [] Sinus Bradycardia  [] Rate controlled Afib  [] Afib w/ RVR  [] Other -       /80   Pulse 84   Temp 98.7 °F (37.1 °C) (Oral)   Resp 16   Ht 1.854 m (6' 1\")   Wt (!) 167.1 kg (368 lb 6.2 oz)   SpO2 94%   BMI 48.60 kg/m²     Diet: ADULT DIET; Regular    DVT Prophylaxis: [x]PPx LMWH  []SQ Heparin  []IPC/SCDs  []Eliquis  []Xarelto  []Coumadin  []Other -      Code status: Full Code    PT/OT Eval Status:   []NOT yet ordered  []Ordered and Pending   [x]Seen with Recommendations for: continuing to assess  []Home independently  []Home w/ assist  []HHC  []SNF  []Acute Rehab    Anticipated Discharge Day/Date:  Remains in ICU. Patient is likely to remain in-house at least for the foreseeable future pending clinical course, subspecialty recs and PT/OT eval/recs if/when medically appropriate.       Anticipated Discharge Location: [x]Home  []HHC  []SNF  []Acute Rehab  []ECF  []LTAC  []Hospice  []Other -      Consults:      PHARMACY TO DOSE VANCOMYCIN  IP CONSULT TO CARDIOTHORACIC SURGERY  IP CONSULT TO INFECTIOUS DISEASES  IP CONSULT TO

## 2024-06-26 NOTE — PROGRESS NOTES
CVTS Thoracic Progress Note:          CC: Post op follow up    Subj: Doing well but still having some pain this AM. Asking when his chest tubes can come out and is eager to go home.    Obj: Blood pressure 137/80, pulse 84, temperature 98.7 °F (37.1 °C), temperature source Oral, resp. rate 16, height 1.854 m (6' 1\"), weight (!) 167.1 kg (368 lb 6.2 oz), SpO2 94 %.     General: Laying in chair comfortably  Cardiovascular: Sinus rhythm on monitor, HR 80's  Pulmonary: NWOB on nasal cannula  Abdomen soft and non-distended  Incision C/D/I  Chest tube with 200ml serosanguinous drainage in last 24 hours/no airleak    Diagnostics:   CBC with Differential:    Lab Results   Component Value Date/Time    WBC 15.1 06/26/2024 04:39 AM    RBC 3.44 06/26/2024 04:39 AM    HGB 10.3 06/26/2024 04:39 AM    HCT 30.9 06/26/2024 04:39 AM     06/26/2024 04:39 AM    MCV 89.8 06/26/2024 04:39 AM    MCH 30.1 06/26/2024 04:39 AM    MCHC 33.5 06/26/2024 04:39 AM    RDW 14.4 06/26/2024 04:39 AM    BANDSPCT 3 06/20/2024 04:57 AM    METASPCT 1 06/20/2024 04:57 AM    LYMPHOPCT 16.0 06/20/2024 04:57 AM    MONOPCT 9.0 06/20/2024 04:57 AM    MYELOPCT 1 06/20/2024 04:57 AM    EOSPCT 1.0 06/20/2024 04:57 AM    BASOPCT 0.0 06/20/2024 04:57 AM    MONOSABS 0.8 06/20/2024 04:57 AM    EOSABS 0.1 06/20/2024 04:57 AM    BASOSABS 0.0 06/20/2024 04:57 AM     BMP:    Lab Results   Component Value Date/Time     06/26/2024 04:39 AM    K 4.6 06/26/2024 04:39 AM    CL 98 06/26/2024 04:39 AM    CO2 27 06/26/2024 04:39 AM    BUN 14 06/26/2024 04:39 AM    CREATININE 1.3 06/26/2024 04:39 AM    CALCIUM 8.2 06/26/2024 04:39 AM    LABGLOM 72 06/26/2024 04:39 AM    GLUCOSE 103 06/26/2024 04:39 AM     Recent Labs     06/24/24  1312 06/25/24  0626 06/25/24  0805 06/26/24  0439   WBC 16.4* 16.0*  --  15.1*   HGB 12.2* 11.1* 12.4* 10.3*   HCT 37.8* 33.6*  --  30.9*    380  --  328                                                                    Recent Labs

## 2024-06-26 NOTE — CARE COORDINATION
LOS 6.  Care managed by [facility], CV surg, Pulm, ID. S/P Thorocotomy. Has CT. From home w spouse and IPTA. Discussed dispo w pt at bedside. Declines rehab. Plans home-states spouse is RN but open to HHC. Agreeable to agency that accepts payer and  can service area.  No documentation of home IVAB needs at this time. [facility] could do CT if needed- but they have no PT available for 2 weeks. Call to [facility]- cannot do CT- referral faxed. CM continues to follow. Lorie Johnson RN

## 2024-06-26 NOTE — PROGRESS NOTES
Occupational Therapy  Facility/Department: [facility] C2 CARD TELEMETRY  Daily Treatment Note  NAME: Ángel Stauffer  : 1985  MRN: [id number]    Date of Service: 2024    Discharge Recommendations:  24 hour supervision or assist, Home with Home health OT  OT Equipment Recommendations  Equipment Needed: Yes  Mobility Devices: ADL Assistive Devices  ADL Assistive Devices: Transfer Tub Bench    If pt is unable to be seen after this session, please let this note serve as discharge summary.  Please see case management note for discharge disposition.  Thank you.    Patient Diagnosis(es): The primary encounter diagnosis was Right arm pain. A diagnosis of Empyema (HCC) was also pertinent to this visit.     Assessment    Assessment: Pt tolerated OT/PT cotx well. Co-tx collaboration this date to safely meet goals and will have better occupational performance outcomes with in a co-treatment than 1:1 session. He progressed to CGA for functional transfers and mobility with rollator (ambulated up to ~280 ft in dixon). He participated in BUE and BLE therex with good tolerance. He will benefit from continued skilled acute OT services to maximize safety and IND with ADLs and mobility. Home with 24hrSPV and HHOT is recommended at d/c.   Activity Tolerance: Patient tolerated treatment well;Patient limited by pain;Patient limited by endurance  Discharge Recommendations: 24 hour supervision or assist;Home with Home health OT  Equipment Needed: Yes  Mobility Devices: ADL Assistive Devices      Plan   Occupational Therapy Plan  Times Per Week: 4-6x/wk  Current Treatment Recommendations: Strengthening;Functional mobility training;Balance training;Endurance training;Self-Care / ADL;Patient/Caregiver education & training     Restrictions  Restrictions/Precautions  Restrictions/Precautions: Fall Risk;Up as Tolerated  Position Activity Restriction  Other position/activity restrictions: ICU monitoring, chest tube, wound vac, infante, no

## 2024-06-26 NOTE — PROGRESS NOTES
Physical Therapy  Facility/Department: [facility] C2 CARD TELEMETRY  Daily Treatment Note  NAME: Ángel Stauffer  : 1985  MRN: [id number]    Date of Service: 2024    Discharge Recommendations:  24 hour supervision or assist   PT Equipment Recommendations  Other: CTA, may need rollator  If pt discharges prior to next PT session this note will serve as discharge summary.   Patient Diagnosis(es): The primary encounter diagnosis was Right arm pain. A diagnosis of Empyema (HCC) was also pertinent to this visit.    Assessment   Assessment: RN cleared pt for therapy, Pt agrees to therapy, up in chair on approach. Family arrived mid session. Pt participated in seated BLE therapeutic ex 15 x each. Transfers sit><stand with CG and cues. Pt amb 280 ft with rollator with CG. Pt reported mild dizziness with ambulation. Vital signs stable. Pt will benefit from skilled PT to address current deficits. Recommend home with 24 hr assist initially at discharge  Activity Tolerance: Patient tolerated treatment well;Patient limited by pain;Patient limited by endurance  Other: CTA, may need rollator     Plan    Physical Therapy Plan  General Plan: 5-7 times per week  Current Treatment Recommendations: Strengthening;Balance training;Gait training;Functional mobility training;Transfer training;Endurance training;Pain management;Equipment evaluation, education, & procurement;Patient/Caregiver education & training;Safety education & training;Therapeutic activities;Home exercise program;Stair training     Restrictions  Restrictions/Precautions  Restrictions/Precautions: Fall Risk, Up as Tolerated  Position Activity Restriction  Other position/activity restrictions: ICU monitoring, chest tube, wound vac, infante, no lifting >5 lbs with RUE     Subjective    Subjective  Subjective: Pt agrees to therapy, eager to walk. Son and mother arrived during session.  Pain: reports discomfort in chest tube site, does not rate  Orientation  Overall

## 2024-06-26 NOTE — PROGRESS NOTES
06/26/24 0421   NIV Type   NIV Started/Stopped On   Equipment Type v60   Mode Bilevel   Mask Type Full face mask   Mask Size Large   Assessment   Pulse 94   Respirations 18   SpO2 97 %   Comfort Level Good   Using Accessory Muscles No   Mask Compliance Good   Skin Assessment Clean, dry, & intact   Skin Protection for O2 Device Yes   Location Nose   Intervention(s) Skin Barrier   Breath Sounds   Right Upper Lobe Diminished   Right Middle Lobe Diminished   Right Lower Lobe Diminished   Left Upper Lobe Diminished   Left Lower Lobe Diminished   Settings/Measurements   IPAP 12 cmH20   CPAP/EPAP 6 cmH2O   Vt (Measured) 365 mL   Rate Ordered 12   FiO2  30 %   Minute Volume (L/min) 8 Liters   Mask Leak (lpm) 0 lpm   Patient's Home Machine No   Alarm Settings   Alarms On Y

## 2024-06-26 NOTE — PROGRESS NOTES
06/25/24 2222   NIV Type   $NIV $Daily Charge   NIV Started/Stopped On   Equipment Type v60   Mode Bilevel   Mask Type Full face mask   Mask Size Large   Assessment   Pulse 96   Respirations 22   BP (!) 150/95   SpO2 92 %   Mask Compliance Good   Skin Assessment Clean, dry, & intact   Breath Sounds   Right Upper Lobe Diminished   Right Middle Lobe Diminished   Right Lower Lobe Diminished   Left Upper Lobe Diminished   Left Lower Lobe Diminished   Settings/Measurements   PIP Observed 12 cm H20   IPAP 12 cmH20   CPAP/EPAP 6 cmH2O   Vt (Measured) 635 mL   Rate Ordered 12   FiO2  30 %   Minute Volume (L/min) 14.9 Liters   Mask Leak (lpm) 5 lpm   Patient's Home Machine No   Alarm Settings   Alarms On Y   Low Pressure (cmH2O) 6 cmH2O   High Pressure (cmH2O) 30 cmH2O   Apnea (secs) 20 secs   RR Low (bpm) 6   RR High (bpm) 50 br/min

## 2024-06-26 NOTE — PROGRESS NOTES
PULMONARY AND CRITICAL CARE INPATIENT NOTE        Ángel Stauffer   : 1985  MRN: [id number]     Admitting Physician: Amando Sebastian MD  Attending Physician: Amando Sebastian MD  PCP: Eddie Calvillo DO    Admission: 2024   Date of Service: 2024    No chief complaint on file.          ASSESSMENT & PLAN       39 y.o. pleasant  male patient with:    Assessment:  Complicated right parapneumonic effusion/empyema.  S/p tPA/dornase installations and chest tube.  S/p VATS/decortication on 2024  Rhinovirus respiratory infection  COPD  CHIDI. Not on CPAP for a while   Morbid obesity      Plan:              Gera zosyn per ID  Chest tube management per CT surgery  BiPAP at bedtime. Sleep apnea revaluation after d/c  Soria out today  Pain control  IS/Flutter valve/Volara/Mucomyst/Nebs  Delirium precautions.  Continue to wean oxygen with target 90 to 94%.  Target Hb >7, Platelets>50 unless specified otherwise.  Bowel regimen: Notify provider if no bowel movement for 48 hrs.  PT/OT when patient is able to participate.  Alert provider about unnecessary catheters or tubes or with early signs of inflammation to discontinue.      LOS: Hospital Day: 1    Code:Full Code        Subjective/Objective             Summary:   59-year-old male patient with HTN with recent admission for pneumonia and small pleural effusion with admitted with persistent fever, headache and cough.    Interval history/Encounter 2024   Patient was seen by infectious disease yesterday and continued on Zosyn for empyema  Patient remained afebrile and hemodynamically stable on BiPAP overnight and 1 L on 9 during the day  I's and O's -1700 cc with urine output of 2.5 L  Patient remained on albuterol, [place], famotidine, Zosyn per ID  Blood sugar remained within range  No new positive micro results  CBC and chemistry relatively stable  Chest x-ray showed no new findings postoperatively with stable atelectasis in the right

## 2024-06-26 NOTE — PROGRESS NOTES
ID Follow-up NOTE    CC:   Lung empyema   Antibiotics: Vanco, pip-tazo    Admit Date: 6/20/2024  Hospital Day: 6    Subjective:     Patient was extubated this AM. Wants to go home, pain at CT sites. No fever       Objective:     Patient Vitals for the past 8 hrs:   BP Temp Temp src Pulse Resp SpO2   06/25/24 2045 (!) 173/98 -- -- 95 25 93 %   06/25/24 2000 (!) 175/102 -- -- 100 21 97 %   06/25/24 1954 -- -- -- 97 12 100 %   06/25/24 1953 -- -- -- 95 18 96 %   06/25/24 1900 (!) 165/81 98.8 °F (37.1 °C) Oral 95 25 97 %   06/25/24 1800 (!) 169/74 -- -- 100 22 --   06/25/24 1700 (!) 157/77 -- -- 99 21 --   06/25/24 1643 -- -- -- 89 -- 96 %   06/25/24 1600 (!) 178/88 97.6 °F (36.4 °C) Axillary 100 19 98 %   06/25/24 1531 -- -- -- 89 20 96 %   06/25/24 1400 121/61 -- -- 92 -- 93 %   06/25/24 1300 (!) 131/56 -- -- 95 24 97 %     I/O last 3 completed shifts:  In: 4576.6 [P.O.:840; I.V.:2506.7; Blood:500; NG/GT:180; IV Piggyback:549.9]  Out: 3085 [Urine:1985; Blood:700; Chest Tube:400]  I/O this shift:  In: 100 [I.V.:100]  Out: -     EXAM:  GENERAL: No apparent distress.    HEENT: Membranes moist, no oral lesion  NECK:  Supple, no lymphadenopathy  LUNGS: Diminished in right bases, two right sided chest tubes with serosanginous output. On NC O2  CARDIAC: RRR, no murmur appreciated  ABD:  + BS, soft / NT, Soria in place with clear yellow urine.   EXT:  No rash, no edema, no lesions  NEURO: No focal neurologic findings  PSYCH: Orientation, sensorium, mood normal  LINES:  Peripheral iv       Data Review:  Lab Results   Component Value Date    WBC 16.0 (H) 06/25/2024    HGB 12.4 (L) 06/25/2024    HCT 33.6 (L) 06/25/2024    MCV 88.8 06/25/2024     06/25/2024     Lab Results   Component Value Date    CREATININE 1.4 (H) 06/25/2024    BUN 15 06/25/2024     06/25/2024    K 4.9 06/25/2024    CL 99 06/25/2024    CO2 27 06/25/2024       Hepatic Function Panel: No results found for: \"ALKPHOS\", \"ALT\", \"AST\", \"PROT\", \"BILITOT\",

## 2024-06-27 ENCOUNTER — APPOINTMENT (OUTPATIENT)
Dept: GENERAL RADIOLOGY | Age: 39
End: 2024-06-27
Attending: INTERNAL MEDICINE
Payer: COMMERCIAL

## 2024-06-27 LAB
ALBUMIN SERPL-MCNC: 2.7 G/DL (ref 3.4–5)
ANION GAP SERPL CALCULATED.3IONS-SCNC: 8 MMOL/L (ref 3–16)
BUN SERPL-MCNC: 17 MG/DL (ref 7–20)
CALCIUM SERPL-MCNC: 8.4 MG/DL (ref 8.3–10.6)
CHLORIDE SERPL-SCNC: 99 MMOL/L (ref 99–110)
CO2 SERPL-SCNC: 28 MMOL/L (ref 21–32)
CREAT SERPL-MCNC: 1.3 MG/DL (ref 0.9–1.3)
DEPRECATED RDW RBC AUTO: 14.1 % (ref 12.4–15.4)
GFR SERPLBLD CREATININE-BSD FMLA CKD-EPI: 72 ML/MIN/{1.73_M2}
GLUCOSE SERPL-MCNC: 103 MG/DL (ref 70–99)
HCT VFR BLD AUTO: 30.2 % (ref 40.5–52.5)
HGB BLD-MCNC: 9.9 G/DL (ref 13.5–17.5)
MAGNESIUM SERPL-MCNC: 2 MG/DL (ref 1.8–2.4)
MCH RBC QN AUTO: 29.4 PG (ref 26–34)
MCHC RBC AUTO-ENTMCNC: 32.9 G/DL (ref 31–36)
MCV RBC AUTO: 89.2 FL (ref 80–100)
PHOSPHATE SERPL-MCNC: 3.7 MG/DL (ref 2.5–4.9)
PLATELET # BLD AUTO: 301 K/UL (ref 135–450)
PMV BLD AUTO: 6.9 FL (ref 5–10.5)
POTASSIUM SERPL-SCNC: 4 MMOL/L (ref 3.5–5.1)
POTASSIUM SERPL-SCNC: 4 MMOL/L (ref 3.5–5.1)
RBC # BLD AUTO: 3.39 M/UL (ref 4.2–5.9)
SODIUM SERPL-SCNC: 135 MMOL/L (ref 136–145)
WBC # BLD AUTO: 11.9 K/UL (ref 4–11)

## 2024-06-27 PROCEDURE — 97110 THERAPEUTIC EXERCISES: CPT

## 2024-06-27 PROCEDURE — 71045 X-RAY EXAM CHEST 1 VIEW: CPT

## 2024-06-27 PROCEDURE — 99232 SBSQ HOSP IP/OBS MODERATE 35: CPT | Performed by: INTERNAL MEDICINE

## 2024-06-27 PROCEDURE — 97530 THERAPEUTIC ACTIVITIES: CPT

## 2024-06-27 PROCEDURE — 2000000000 HC ICU R&B

## 2024-06-27 PROCEDURE — 6360000002 HC RX W HCPCS: Performed by: INTERNAL MEDICINE

## 2024-06-27 PROCEDURE — 94660 CPAP INITIATION&MGMT: CPT

## 2024-06-27 PROCEDURE — 80069 RENAL FUNCTION PANEL: CPT

## 2024-06-27 PROCEDURE — 6360000002 HC RX W HCPCS

## 2024-06-27 PROCEDURE — 85027 COMPLETE CBC AUTOMATED: CPT

## 2024-06-27 PROCEDURE — 6370000000 HC RX 637 (ALT 250 FOR IP)

## 2024-06-27 PROCEDURE — 94669 MECHANICAL CHEST WALL OSCILL: CPT

## 2024-06-27 PROCEDURE — 94761 N-INVAS EAR/PLS OXIMETRY MLT: CPT

## 2024-06-27 PROCEDURE — 94640 AIRWAY INHALATION TREATMENT: CPT

## 2024-06-27 PROCEDURE — 2580000003 HC RX 258: Performed by: INTERNAL MEDICINE

## 2024-06-27 PROCEDURE — 36415 COLL VENOUS BLD VENIPUNCTURE: CPT

## 2024-06-27 PROCEDURE — 6360000002 HC RX W HCPCS: Performed by: NURSE PRACTITIONER

## 2024-06-27 PROCEDURE — 2700000000 HC OXYGEN THERAPY PER DAY

## 2024-06-27 PROCEDURE — 83735 ASSAY OF MAGNESIUM: CPT

## 2024-06-27 RX ORDER — LOSARTAN POTASSIUM 100 MG/1
100 TABLET ORAL DAILY
Status: DISCONTINUED | OUTPATIENT
Start: 2024-06-27 | End: 2024-06-28 | Stop reason: HOSPADM

## 2024-06-27 RX ADMIN — BISACODYL 5 MG: 5 TABLET, COATED ORAL at 07:41

## 2024-06-27 RX ADMIN — ACETAMINOPHEN 1000 MG: 500 TABLET ORAL at 01:47

## 2024-06-27 RX ADMIN — MORPHINE SULFATE 2 MG: 2 INJECTION, SOLUTION INTRAMUSCULAR; INTRAVENOUS at 01:45

## 2024-06-27 RX ADMIN — ACETAMINOPHEN 1000 MG: 500 TABLET ORAL at 07:41

## 2024-06-27 RX ADMIN — ALBUTEROL SULFATE 2.5 MG: 2.5 SOLUTION RESPIRATORY (INHALATION) at 08:49

## 2024-06-27 RX ADMIN — MUPIROCIN: 20 OINTMENT TOPICAL at 07:44

## 2024-06-27 RX ADMIN — OXYCODONE 10 MG: 5 TABLET ORAL at 18:20

## 2024-06-27 RX ADMIN — PIPERACILLIN AND TAZOBACTAM 4500 MG: 4; .5 INJECTION, POWDER, LYOPHILIZED, FOR SOLUTION INTRAVENOUS at 21:48

## 2024-06-27 RX ADMIN — ALBUTEROL SULFATE 2.5 MG: 2.5 SOLUTION RESPIRATORY (INHALATION) at 11:54

## 2024-06-27 RX ADMIN — ACETYLCYSTEINE 600 MG: 200 INHALANT RESPIRATORY (INHALATION) at 20:01

## 2024-06-27 RX ADMIN — ALBUTEROL SULFATE 2.5 MG: 2.5 SOLUTION RESPIRATORY (INHALATION) at 03:29

## 2024-06-27 RX ADMIN — GABAPENTIN 300 MG: 300 CAPSULE ORAL at 20:15

## 2024-06-27 RX ADMIN — FAMOTIDINE 20 MG: 20 TABLET, FILM COATED ORAL at 07:41

## 2024-06-27 RX ADMIN — ALBUTEROL SULFATE 2.5 MG: 2.5 SOLUTION RESPIRATORY (INHALATION) at 20:01

## 2024-06-27 RX ADMIN — PIPERACILLIN AND TAZOBACTAM 4500 MG: 4; .5 INJECTION, POWDER, LYOPHILIZED, FOR SOLUTION INTRAVENOUS at 06:31

## 2024-06-27 RX ADMIN — ACETAMINOPHEN 1000 MG: 500 TABLET ORAL at 15:03

## 2024-06-27 RX ADMIN — OXYCODONE 10 MG: 5 TABLET ORAL at 07:40

## 2024-06-27 RX ADMIN — MUPIROCIN: 20 OINTMENT TOPICAL at 20:17

## 2024-06-27 RX ADMIN — GABAPENTIN 300 MG: 300 CAPSULE ORAL at 15:04

## 2024-06-27 RX ADMIN — LOSARTAN POTASSIUM 100 MG: 100 TABLET, FILM COATED ORAL at 10:16

## 2024-06-27 RX ADMIN — FAMOTIDINE 20 MG: 20 TABLET, FILM COATED ORAL at 20:16

## 2024-06-27 RX ADMIN — GABAPENTIN 300 MG: 300 CAPSULE ORAL at 07:40

## 2024-06-27 RX ADMIN — BACITRACIN: 500 OINTMENT TOPICAL at 07:44

## 2024-06-27 RX ADMIN — METHOCARBAMOL 750 MG: 750 TABLET ORAL at 18:20

## 2024-06-27 RX ADMIN — ACETYLCYSTEINE 600 MG: 200 INHALANT RESPIRATORY (INHALATION) at 08:49

## 2024-06-27 RX ADMIN — ALBUTEROL SULFATE 2.5 MG: 2.5 SOLUTION RESPIRATORY (INHALATION) at 23:49

## 2024-06-27 RX ADMIN — PIPERACILLIN AND TAZOBACTAM 4500 MG: 4; .5 INJECTION, POWDER, LYOPHILIZED, FOR SOLUTION INTRAVENOUS at 15:08

## 2024-06-27 ASSESSMENT — PAIN SCALES - WONG BAKER
WONGBAKER_NUMERICALRESPONSE: NO HURT
WONGBAKER_NUMERICALRESPONSE: NO HURT

## 2024-06-27 ASSESSMENT — PAIN SCALES - GENERAL
PAINLEVEL_OUTOF10: 0
PAINLEVEL_OUTOF10: 7
PAINLEVEL_OUTOF10: 9
PAINLEVEL_OUTOF10: 7
PAINLEVEL_OUTOF10: 8
PAINLEVEL_OUTOF10: 5
PAINLEVEL_OUTOF10: 6

## 2024-06-27 ASSESSMENT — PAIN DESCRIPTION - ORIENTATION
ORIENTATION: RIGHT

## 2024-06-27 ASSESSMENT — PAIN DESCRIPTION - LOCATION
LOCATION: CHEST
LOCATION: CHEST
LOCATION: CHEST;SHOULDER
LOCATION: CHEST
LOCATION: CHEST

## 2024-06-27 ASSESSMENT — PAIN DESCRIPTION - DESCRIPTORS
DESCRIPTORS: DISCOMFORT
DESCRIPTORS: ACHING;DISCOMFORT

## 2024-06-27 ASSESSMENT — PAIN - FUNCTIONAL ASSESSMENT: PAIN_FUNCTIONAL_ASSESSMENT: ACTIVITIES ARE NOT PREVENTED

## 2024-06-27 NOTE — PROGRESS NOTES
Significant Events:    1926: 4 mg of morphine given.    2033: Hydralazine 5 mg given due to patient having a systolic of 165 and then a repeat of 172. Patient was seen resting comfortably and having no signs of pain.     4743-4320: Waiting for patients BP to come down. Staying high 160s and 170s.    2202: Patients SBP of 179. Dr. Terry contacted and stated to start a cardene gtt.    2230: Second peripheral IV placed. Patients  and 148 (both arms). Cardene never started.    0145: 2 mg of morphine administered.

## 2024-06-27 NOTE — PROGRESS NOTES
CVTS Thoracic Progress Note:          CC: Post op follow up    Subj: Doing well but still having some pain this AM. Asking when his chest tubes can come out and is eager to go home.    Obj: Blood pressure (!) 179/105, pulse 89, temperature 99 °F (37.2 °C), temperature source Axillary, resp. rate 19, height 1.854 m (6' 1\"), weight (!) 166.1 kg (366 lb 2.9 oz), SpO2 96 %.     General: Sitting in chair comfortably  Cardiovascular: Sinus rhythm on monitor, HR 80's  Pulmonary: NWOB on nasal cannula  Abdomen soft and non-distended  Incision C/D/I  Chest tube with 150ml serosanguinous drainage in last 24 hours/no airleak    Diagnostics:   CBC with Differential:    Lab Results   Component Value Date/Time    WBC 11.9 06/27/2024 04:33 AM    RBC 3.39 06/27/2024 04:33 AM    HGB 9.9 06/27/2024 04:33 AM    HCT 30.2 06/27/2024 04:33 AM     06/27/2024 04:33 AM    MCV 89.2 06/27/2024 04:33 AM    MCH 29.4 06/27/2024 04:33 AM    MCHC 32.9 06/27/2024 04:33 AM    RDW 14.1 06/27/2024 04:33 AM    BANDSPCT 3 06/20/2024 04:57 AM    METASPCT 1 06/20/2024 04:57 AM    LYMPHOPCT 16.0 06/20/2024 04:57 AM    MONOPCT 9.0 06/20/2024 04:57 AM    MYELOPCT 1 06/20/2024 04:57 AM    EOSPCT 1.0 06/20/2024 04:57 AM    BASOPCT 0.0 06/20/2024 04:57 AM    MONOSABS 0.8 06/20/2024 04:57 AM    EOSABS 0.1 06/20/2024 04:57 AM    BASOSABS 0.0 06/20/2024 04:57 AM     BMP:    Lab Results   Component Value Date/Time     06/27/2024 04:33 AM    K 4.0 06/27/2024 04:33 AM    K 4.0 06/27/2024 04:33 AM    CL 99 06/27/2024 04:33 AM    CO2 28 06/27/2024 04:33 AM    BUN 17 06/27/2024 04:33 AM    CREATININE 1.3 06/27/2024 04:33 AM    CALCIUM 8.4 06/27/2024 04:33 AM    LABGLOM 72 06/27/2024 04:33 AM    GLUCOSE 103 06/27/2024 04:33 AM     Recent Labs     06/25/24  0626 06/25/24  0805 06/26/24  0439 06/27/24  0433   WBC 16.0*  --  15.1* 11.9*   HGB 11.1* 12.4* 10.3* 9.9*   HCT 33.6*  --  30.9* 30.2*     --  328 301                                                                     Recent Labs     06/25/24  0626 06/26/24  0439 06/27/24  0433    132* 135*   K 4.9 4.6 4.0  4.0   CL 99 98* 99   CO2 27 27 28   BUN 15 14 17   CREATININE 1.4* 1.3 1.3   GLUCOSE 124* 103* 103*   PHOS 4.9 4.2 3.7            Recent Labs     06/25/24  0626 06/26/24  0439 06/27/24  0433   MG 1.90 2.00 2.00        No results for input(s): \"TROPONINI\" in the last 72 hours.  No results for input(s): \"INR\" in the last 72 hours.    Xray Result (most recent):  XR CHEST PORTABLE 06/27/2024    Narrative  EXAMINATION:  ONE XRAY VIEW OF THE CHEST    6/27/2024 5:58 am    COMPARISON:  Chest radiograph dated 06/26/2024    HISTORY:  ORDERING SYSTEM PROVIDED HISTORY: evaluating atelectasis  TECHNOLOGIST PROVIDED HISTORY:  Reason for exam:->evaluating atelectasis  Reason for Exam: evaluating atelectasis    FINDINGS:  Medical devices: Stable positioning of right surgical chest tubes.    Mediastinum/Heart: The mediastinal contours are unchanged compared to prior  exam.    Lungs: The left lung is clear.  No significant interval change in patchy  atelectasis of the right lung.  No developing consolidative opacity.    Pleura: No evidence of pneumothorax.  No large pleural effusion.    Bones/Soft tissues: Nothing acute.    Impression  Stable radiographic appearance of right surgical chest tubes and associated  atelectasis.      Assess/Plan:   -Care per medical team.    POD #3 s/p open right thoracotomy with total lung decortication    - Chest tubes to water seal.  - Daily CXR.  - Add on Gabapentin & Robaxin to optimize pain control.  Pain better this morning.    - PRN hydralazine for BP control.  - Restarting home dose Losartan.  - Cr 1.3 this AM, stable.  __________________________________________________________  SCIP Measures:   Soria catheter : d/c 6/26/24  Antibiotics: ? Have been stopped  ____________________________________________________    PATSY Chaidez CNP  6/27/2024  9:36

## 2024-06-27 NOTE — PROGRESS NOTES
PULMONARY AND CRITICAL CARE INPATIENT NOTE        Ángel Stauffer   : 1985  MRN: [id number]     Admitting Physician: Amando Sebastian MD  Attending Physician: Amando Sebastian MD  PCP: Eddie Calvillo DO    Admission: 2024   Date of Service: 2024    No chief complaint on file.          ASSESSMENT & PLAN       39 y.o. pleasant  male patient with:    Assessment:  Complicated right parapneumonic effusion/empyema.  S/p tPA/dornase installations and chest tube.  S/p VATS/decortication on 2024  Rhinovirus respiratory infection  COPD  CHIDI. Not on CPAP for a while   Morbid obesity      Plan:              Gera zosyn per ID  Chest tube management per CT surgery  BiPAP at bedtime. Sleep apnea revaluation after d/c  Pain control  IS/Flutter valve/Volara/Mucomyst/Nebs  Continue to wean oxygen with target 90 to 94%.  Target Hb >7, Platelets>50 unless specified otherwise.  Bowel regimen: Notify provider if no bowel movement for 48 hrs.  PT/OT when patient is able to participate.  Alert provider about unnecessary catheters or tubes or with early signs of inflammation to discontinue.      LOS: Hospital Day: 1    Code:Full Code        Subjective/Objective             Summary:   59-year-old male patient with HTN with recent admission for pneumonia and small pleural effusion with admitted with persistent fever, headache and cough.    Interval history/Encounter 2024   Patient remained afebrile and hemodynamically stable with her blood pressure at times  Continue to sleep on BiPAP.  I's and O's -2.5 L with urine output of 2.6 L  Patient remained on Zosyn as per ID recommendations Mucomyst and DuoNebs.  He required nicardipine temporarily for blood pressure  Blood sugar remained within range  No new positive micro results chemistry and CBC remained relatively stable      Objective    Test Results:  Imaging:  I have reviewed radiology images personally.    XR CHEST PORTABLE    Result Date: 2024  Persistent  trace right pleural effusion and right basilar atelectasis or airspace disease.     XR CHEST PORTABLE    Result Date: 6/23/2024  No significant interval change in small right pleural effusion and right basilar atelectasis/airspace disease as compared to prior.        Chest x-ray June 25, 2024  Stable exam with bibasilar atelectasis    PFTS:  NA      Cardiology:      Sleep:  NA      Physical Exam:  General appearance: Not in distress  HEENT: Moist mucous membranes.    Cardiac: No murmur  Lungs: Decreased air entry on the right side with chest tubes in place  Abdomen: Soft.  Morbid obesity  Skin, Back & Extremities: No rash.  Neurological: No focal or lateralizing signs.     ________________________________________________________  Electronically signed by:  Karrie Belcher MD,FACP    6/27/2024    8:16 AM.     [facility]  [address], [place] [address]   Phone (office): [phone]   Box [box

## 2024-06-27 NOTE — PROGRESS NOTES
06/27/24 0336   NIV Type   NIV Started/Stopped On   Equipment Type V60   Mode Bilevel   Mask Type Full face mask   Mask Size Large   Assessment   Respirations 18   /70   SpO2 97 %   Comfort Level Good   Using Accessory Muscles No   Mask Compliance Good   Skin Assessment Clean, dry, & intact   Skin Protection for O2 Device Yes   Location Nose   Intervention(s) Skin Barrier   Breath Sounds   Right Upper Lobe Diminished   Right Middle Lobe Diminished   Right Lower Lobe Diminished   Left Upper Lobe Diminished   Left Lower Lobe Diminished   Settings/Measurements   IPAP 12 cmH20   CPAP/EPAP 6 cmH2O   Vt (Measured) 665 mL   Rate Ordered 12   FiO2  30 %   Minute Volume (L/min) 11.2 Liters   Mask Leak (lpm) 0 lpm   Patient's Home Machine No   Alarm Settings   Alarms On Y

## 2024-06-27 NOTE — PROGRESS NOTES
Occupational Therapy  Facility/Department: [facility] C2 CARD TELEMETRY  Daily Treatment Note and Discharge Summary    NAME: Ángel Stauffer  : 1985  MRN: [id number]    Date of Service: 2024    Discharge Recommendations:  24 hour supervision or assist, Home with Home health OT  OT Equipment Recommendations  Equipment Needed: Yes  Mobility Devices: ADL Assistive Devices  ADL Assistive Devices: Transfer Tub Bench    Patient Diagnosis(es): The primary encounter diagnosis was Right arm pain. A diagnosis of Empyema (HCC) was also pertinent to this visit.     Assessment    Assessment: Pt tolerated OT session well. He progressed to SPV for all transfers and mobility with no AD. He was educated on safety with ADL tasks at d/c and verbalized understanding. He reports IND with ADLs within hospital room and has no concerns regarding performance of self-care tasks at home. He performed BUE therex and was given BUE HEP for home. He is functioning close to his baseline and does not require continued skilled OT services at this time. Recommend home with 24hrSPV and HHOT at d/c.  Activity Tolerance: Patient tolerated treatment well;Patient limited by pain  Discharge Recommendations: 24 hour supervision or assist;Home with Home health OT  Equipment Needed: Yes  Mobility Devices: ADL Assistive Devices      Plan   Occupational Therapy Plan  Times Per Week: d/c from OT today     Restrictions  Restrictions/Precautions  Restrictions/Precautions: Fall Risk;Up as Tolerated  Position Activity Restriction  Other position/activity restrictions: ICU monitoring, chest tube, wound vac, no lifting >5 lbs with RUE    Subjective   Subjective  Subjective: Pt seated in recliner upon OT arrival, agreeable to OT tx  Pain: Denies pain, reports soreness in chest tube site  Orientation  Overall Orientation Status: Within Functional Limits  Orientation Level: Oriented X4  Pain: reports discomfort in chest tube site, does not rate  Cognition  Overall

## 2024-06-27 NOTE — PROGRESS NOTES
SURGERY  IP CONSULT TO INFECTIOUS DISEASES  IP CONSULT TO PULMONOLOGY  IP CONSULT TO DIETITIAN  IP CONSULT TO PULMONOLOGY      This patient has a high likelihood of being discharged tomorrow and is appropriate for A1 Discharge Unit in AM pending clinical course overnight: []Yes  [x]No    ------------------------------------------------------------------------------------------------------------------------------------------------------------------------    [facility]  (this section is simply meant as an aid to accurate billing and does not necessarily reflect ongoing patient care which is documented elsewhere in the medical record)    [x] High (any 2)    A. Problems (any 1)  [x] Acute/Chronic Illness/injury posing threat to life or bodily function:  S/P R Lung Decortication.  [] Severe exacerbation of chronic illness:    ---------------------------------------------------------------------  B. Risk of Treatment (any 1)   [x] Drugs/treatments that require intensive monitoring for toxicity include:    [] IV ABX requiring serial renal monitoring for nephrotoxicity:     [] Post-Cath/Contrast study requiring serial renal monitoring for Contrast Induced Nephropathy  [x] IV Narcotic analgesia for ADR   [] Aggressive IV diuresis requiring serial monitoring for renal impairment and electrolyte derangements  [] Hypertonic Saline requiring serial renal monitoring for appropriate electrolyte correction rate   [] Critical electrolyte abnormalities requiring IV replacement and close serial monitoring  [] Insulin - monitoring FSBS for Hypoglycemic ADR  [] Anticoagulation requiring close serial monitoring and dose adjustments at high risk of ADR   [] HD requiring close serial monitoring of electrolytes and fluid status  [] Other -  [] Change in code status:    [] Decision to escalate care:    [] Major surgery/procedure with associated risk factors:    ----------------------------------------------------------------------  C. Data (any  2)  [] Discussed management of the case with consultants as follows:    [x] Discussed the discharge plan in detail with case mgt including timing/barriers to discharge, need for support services and placement decision   [] Imaging personally reviewed and interpreted, includes:   [x] Telemetry monitoring as noted above  [x] Data Review (any 3)  [] Collateral history obtained from:    [x] All available Consultant notes from yesterday/today were reviewed  [x] All current labs were reviewed and interpreted for clinical significance   [x] Appropriate follow-up labs were ordered    Medications:  Personally reviewed in detail in conjunction w/ labs as documented for evidence of drug toxicity.     Infusion Medications    niCARdipine Stopped (06/26/24 2300)    sodium chloride       Scheduled Medications    acetylcysteine  600 mg Inhalation BID RT    albuterol  2.5 mg Nebulization Q4H RT    piperacillin-tazobactam  4,500 mg IntraVENous Q8H    mupirocin   Each Nostril BID    gabapentin  300 mg Oral TID    sodium chloride flush  5-40 mL IntraVENous 2 times per day    bacitracin   Topical Daily    famotidine  20 mg Oral BID    acetaminophen  1,000 mg Oral Q6H    polyethylene glycol  17 g Oral Daily    bisacodyl  5 mg Oral Daily     PRN Meds: hydrALAZINE, sodium chloride flush, sodium chloride, potassium chloride **OR** potassium alternative oral replacement **OR** potassium chloride, magnesium sulfate, oxyCODONE **OR** oxyCODONE, morphine **OR** morphine, ondansetron **OR** ondansetron, magnesium hydroxide, bisacodyl, methocarbamol     Labs:  Personally reviewed and interpreted for clinical significance.     Recent Labs     06/25/24 0626 06/25/24  0805 06/26/24 0439 06/27/24  0433   WBC 16.0*  --  15.1* 11.9*   HGB 11.1* 12.4* 10.3* 9.9*   HCT 33.6*  --  30.9* 30.2*     --  328 301       Recent Labs     06/25/24 0626 06/26/24 0439 06/27/24  0433    132* 135*   K 4.9 4.6 4.0  4.0   CL 99 98* 99   CO2 27 27 28

## 2024-06-27 NOTE — PLAN OF CARE
Problem: Pain  Goal: Verbalizes/displays adequate comfort level or baseline comfort level  Outcome: Completed  Flowsheets  Taken 6/27/2024 0400  Verbalizes/displays adequate comfort level or baseline comfort level:   Encourage patient to monitor pain and request assistance   Assess pain using appropriate pain scale   Administer analgesics based on type and severity of pain and evaluate response   Implement non-pharmacological measures as appropriate and evaluate response   Consider cultural and social influences on pain and pain management  Taken 6/27/2024 0000  Verbalizes/displays adequate comfort level or baseline comfort level:   Encourage patient to monitor pain and request assistance   Assess pain using appropriate pain scale   Administer analgesics based on type and severity of pain and evaluate response   Implement non-pharmacological measures as appropriate and evaluate response   Consider cultural and social influences on pain and pain management  Taken 6/26/2024 2000  Verbalizes/displays adequate comfort level or baseline comfort level:   Encourage patient to monitor pain and request assistance   Assess pain using appropriate pain scale   Administer analgesics based on type and severity of pain and evaluate response   Implement non-pharmacological measures as appropriate and evaluate response

## 2024-06-27 NOTE — PROGRESS NOTES
Shift: 1713-6090    Procedure:     OPEN RIGHT THORACOTOMY WITH TOTAL LUNG DECORTICATION     Surgeon(s):  Emmanuel Terry MD     Assistant:   Physician Assistant: Beulah Vanessa PA-C     Anesthesia: General     Estimated Blood Loss (mL): 500     Complications: None    Admit from OR (time and date): 6/24/2024 @ 1200      Surgery, return to OR no     Nursing assessment at handoff  stable    Most recent vitals: /70   Pulse 86   Temp 99 °F (37.2 °C) (Axillary)   Resp 23   Ht 1.854 m (6' 1\")   Wt (!) 166.1 kg (366 lb 2.9 oz)   SpO2 97%   BMI 48.31 kg/m²      Increased O2 requirements: no O2 requirements: Oxygen Therapy  SpO2: 97 %  Pulse Oximetry Type: Continuous  Pulse Oximeter Device Mode: Continuous  Pulse Oximeter Device Location: Finger  O2 Device: PAP (positive airway pressure)  Oximetry Probe Site Changed: Yes  Skin Assessment: Clean, dry, & intact  Skin Protection for O2 Device: Yes  Orientation: Middle  Location: Nose  Intervention(s): Skin Barrier  FiO2 : 30 %  O2 Flow Rate (L/min): 3 L/min  Vent Mode: (S) CPAP/PS     Admission weight Weight - Scale: (!) 166.2 kg (366 lb 4.8 oz)  Today's weight   Wt Readings from Last 1 Encounters:   06/27/24 (!) 166.1 kg (366 lb 2.9 oz)           Drop in Urinary Output no     Rhythm Changes Normal Sinus Rhythm      Lines/Drains  LDA Insertion Date Discontinued Date Dressing Changes   Art line      Central Line      Soria 6/24/24     Chest Tube 6/24/24     Wires       ETT 6/24/24     GABRIEL Drain      VasCath      Impella        Interventions After Office Hours  Problem(Brief) Date Time Intervention Physician contacted                                               Drip rates at handoff:    niCARdipine Stopped (06/26/24 2300)    sodium chloride         Hospital Course:  POD#0 Days:   -Patient out of OR at 1200  -Remained hemodynamically stable throughout shift   -Will remain mechanically ventilated today per Dr. Terry   -Patients wife at bedside and updated on  plan of care     POD #0 Nights 6/25  -no acute event  -still on ventilator  -SAT done    POD #1 Nights 6/26:  - Patient needed prn pain medication - 2 mg and 4 mg of morphine administered  - CT output of 30 marked at 860  - Patient walked whole unit and tolerated very well - no pauses needed  - Urine output of 1150 mL  - Chest tube site clean dry and intact  - Cardene gtt never started         Lab Data:  CBC:   Recent Labs     06/26/24  0439 06/27/24  0433   WBC 15.1* 11.9*   HGB 10.3* 9.9*   HCT 30.9* 30.2*   MCV 89.8 89.2    301       BMP:    Recent Labs     06/26/24  0439 06/27/24  0433   * 135*   K 4.6 4.0  4.0   CO2 27 28   BUN 14 17   CREATININE 1.3 1.3       LIVR: No results for input(s): \"AST\", \"ALT\" in the last 72 hours.  PT/INR: No results for input(s): \"PROT\", \"INR\" in the last 72 hours.  APTT: No results for input(s): \"APTT\" in the last 72 hours.  ABG:   Recent Labs     06/24/24  1301 06/25/24  0805   PHART 7.351 7.397   [id number] 53.8* 48.8*   PO2ART 74.6* 140.9*        Electronically signed by Elena Jay RN on 6/27/2024 at 6:43 AM

## 2024-06-27 NOTE — PROGRESS NOTES
Shift: 5450-7803    Procedure:     OPEN RIGHT THORACOTOMY WITH TOTAL LUNG DECORTICATION     Surgeon(s):  Emmanuel Terry MD     Assistant:   Physician Assistant: Beulah Vanessa PA-C     Anesthesia: General     Estimated Blood Loss (mL): 500     Complications: None    Admit from OR (time and date): 6/24/2024 @ 1200      Surgery, return to OR no     Nursing assessment at handoff  stable    Most recent vitals: BP (!) 140/79   Pulse 83   Temp 98 °F (36.7 °C) (Oral)   Resp 20   Ht 1.854 m (6' 1\")   Wt (!) 166.1 kg (366 lb 2.9 oz)   SpO2 95%   BMI 48.31 kg/m²      Increased O2 requirements: no O2 requirements: Oxygen Therapy  SpO2: 95 %  Pulse Oximetry Type: Intermittent  Pulse Oximeter Device Mode: Intermittent  Pulse Oximeter Device Location: Finger  O2 Device: None (Room air)  Oximetry Probe Site Changed: Yes  Skin Assessment: Clean, dry, & intact  Skin Protection for O2 Device: Yes  Orientation: Middle  Location: Nose  Intervention(s): Skin Barrier  FiO2 : 30 %  O2 Flow Rate (L/min): 3 L/min  Vent Mode: (S) CPAP/PS     Admission weight Weight - Scale: (!) 166.2 kg (366 lb 4.8 oz)  Today's weight   Wt Readings from Last 1 Encounters:   06/27/24 (!) 166.1 kg (366 lb 2.9 oz)           Drop in Urinary Output no     Rhythm Changes Normal Sinus Rhythm      Lines/Drains  LDA Insertion Date Discontinued Date Dressing Changes   Art line      Central Line      Soria 6/24/24 6/26/24    Chest Tube 6/24/24     Wires       ETT 6/24/24 6/25/24    GABRIEL Drain      VasCath      Impella        Interventions After Office Hours  Problem(Brief) Date Time Intervention Physician contacted                                               Drip rates at handoff:    niCARdipine Stopped (06/26/24 2300)    sodium chloride         Hospital Course:  POD#0 Days:   -Patient out of OR at 1200  -Remained hemodynamically stable throughout shift   -Will remain mechanically ventilated today per Dr. Terry   -Patients wife at bedside and updated on

## 2024-06-27 NOTE — PROGRESS NOTES
06/26/24 2350   NIV Type   NIV Started/Stopped On   Equipment Type V60   Mode Bilevel   Mask Type Full face mask   Mask Size Large   Assessment   Pulse 90   Respirations 16   /70   SpO2 99 %   Comfort Level Good   Using Accessory Muscles No   Mask Compliance Good   Skin Assessment Clean, dry, & intact   Skin Protection for O2 Device Yes   Location Nose   Intervention(s) Skin Barrier   Breath Sounds   Right Upper Lobe Diminished   Right Middle Lobe Diminished   Right Lower Lobe Diminished   Left Upper Lobe Diminished   Left Lower Lobe Diminished   Settings/Measurements   IPAP 12 cmH20   CPAP/EPAP 6 cmH2O   Vt (Measured) 538 mL   Rate Ordered 12   FiO2  30 %   Minute Volume (L/min) 9.3 Liters   Mask Leak (lpm) 8 lpm   Patient's Home Machine No   Alarm Settings   Alarms On Y

## 2024-06-27 NOTE — PROGRESS NOTES
ID Follow-up NOTE    CC:   Lung empyema   Antibiotics: pip-tazo    Admit Date: 6/20/2024  Hospital Day: 8    Subjective:     Patient denies any new complaints wants to go home, No fever.      Objective:     Patient Vitals for the past 8 hrs:   BP Pulse Resp SpO2   06/27/24 1850 -- -- 20 --   06/27/24 1820 -- -- 20 --   06/27/24 1800 (!) 140/79 83 -- --   06/27/24 1700 -- 90 -- --   06/27/24 1600 -- 88 -- --   06/27/24 1500 -- 88 -- --   06/27/24 1400 -- 90 -- --   06/27/24 1300 -- 92 -- --   06/27/24 1200 (!) 146/87 94 18 --   06/27/24 1154 -- 83 18 95 %       I/O last 3 completed shifts:  In: 478.4 [I.V.:101.7; IV Piggyback:376.6]  Out: 2840 [Urine:2600; Chest Tube:240]  No intake/output data recorded.    EXAM:  GENERAL: No apparent distress.    HEENT: Membranes moist, no oral lesion  NECK:  Supple, no lymphadenopathy  LUNGS: Diminished in right bases, two right sided chest tubes with serosanginous output. On NC O2  CARDIAC: RRR, no murmur appreciated  ABD:  + BS, soft / NT, Soria in place with clear yellow urine.   EXT:  No rash, no edema, no lesions  NEURO: No focal neurologic findings  PSYCH: Orientation, sensorium, mood normal  LINES:  Peripheral iv       Data Review:  Lab Results   Component Value Date    WBC 11.9 (H) 06/27/2024    HGB 9.9 (L) 06/27/2024    HCT 30.2 (L) 06/27/2024    MCV 89.2 06/27/2024     06/27/2024     Lab Results   Component Value Date    CREATININE 1.3 06/27/2024    BUN 17 06/27/2024     (L) 06/27/2024    K 4.0 06/27/2024    K 4.0 06/27/2024    CL 99 06/27/2024    CO2 28 06/27/2024       Hepatic Function Panel: No results found for: \"ALKPHOS\", \"ALT\", \"AST\", \"PROT\", \"BILITOT\", \"BILIDIR\", \"IBILI\", \"LABALBU\"    MICRO:  6/13     BC x2 neg               MRSA screen neg  Expectorated sputum culture neg  legionella, pneumococcal ag neg    Pna PCR panel rhinovirus/enterovirus   6/17     Pleural fluid culture neg; GS with GPC/GPR     IMAGING:I have independently reviewed the images  and reports.     CT chest 6/13/24  IMPRESSION:  1. No pulmonary embolism.  2. Right lower lobe pneumonia with small parapneumonic effusion.  3. Mediastinal and right hilar lymphadenopathy is likely reactive.     CT chest 6/18/24  IMPRESSION:  No significant interval change in size of loculated right pleural fluid  collection, consistent with empyema, associated with adjacent right lower lobe pneumonia.     CT chest 6/21/24  IMPRESSION:  Decreased right-sided pleural effusion, with small amount of pleural fluid and pleural gas remaining.  There is incomplete re-expansion of the right lung and residual consolidation in the right base   Small mediastinal nodes are noted, likely reactive   Splenomegaly    Portable CXR 6/25:  Stable examination with bibasilar atelectasis.  Stable lines and tubes.    Portable chest x-ray 6/26:  Right-sided chest tubes without radiographically appreciable pneumothorax.  Right lung atelectasis, stable since prior.  Removal of ET tube and enteric tube.    Scheduled Meds:   losartan  100 mg Oral Daily    acetylcysteine  600 mg Inhalation BID RT    albuterol  2.5 mg Nebulization Q4H RT    piperacillin-tazobactam  4,500 mg IntraVENous Q8H    mupirocin   Each Nostril BID    gabapentin  300 mg Oral TID    sodium chloride flush  5-40 mL IntraVENous 2 times per day    bacitracin   Topical Daily    famotidine  20 mg Oral BID    acetaminophen  1,000 mg Oral Q6H    polyethylene glycol  17 g Oral Daily    bisacodyl  5 mg Oral Daily       Continuous Infusions:   niCARdipine Stopped (06/26/24 2300)    sodium chloride         PRN Meds:  hydrALAZINE, sodium chloride flush, sodium chloride, potassium chloride **OR** potassium alternative oral replacement **OR** potassium chloride, magnesium sulfate, oxyCODONE **OR** oxyCODONE, ondansetron **OR** ondansetron, magnesium hydroxide, bisacodyl, methocarbamol      Assessment:     Patient Active Problem List   Diagnosis    Morbid obesity with BMI of 50.0-59.9,

## 2024-06-27 NOTE — PROGRESS NOTES
Physical Therapy  Facility/Department: [facility] C2 CARD TELEMETRY  Daily Treatment Note  NAME: Ángel Stauffer  : 1985  MRN: [id number]    Date of Service: 2024    Discharge Recommendations:  24 hour supervision or assist   PT Equipment Recommendations  Equipment Needed: No  Other: CTA    Patient Diagnosis(es): The primary encounter diagnosis was Right arm pain. A diagnosis of Empyema (HCC) was also pertinent to this visit.    Assessment   Assessment: Pt progressing well with mobility to SBA. Slow cadance but no LOB. Pt ambulated around the unit x 2 and completed stairs with HR and step to pattern. Pt returned to the chair at EOS.  Activity Tolerance: Patient tolerated treatment well;Patient limited by pain;Patient limited by endurance  Equipment Needed: No     Plan    Physical Therapy Plan  General Plan: 5-7 times per week  Current Treatment Recommendations: Strengthening;Balance training;Gait training;Functional mobility training;Transfer training;Endurance training;Pain management;Equipment evaluation, education, & procurement;Patient/Caregiver education & training;Safety education & training;Therapeutic activities;Home exercise program;Stair training     Restrictions  Restrictions/Precautions  Restrictions/Precautions: Fall Risk, Up as Tolerated  Position Activity Restriction  Other position/activity restrictions: ICU monitoring, chest tube, wound vac, infante, no lifting >5 lbs with RUE     Subjective    Subjective  Subjective: Pt up in chair. Agreeable to PT  Pain: reports discomfort in chest tube site, does not rate  Orientation  Overall Orientation Status: Within Functional Limits  Orientation Level: Oriented X4  Cognition  Overall Cognitive Status: WFL     Objective   Vitals     Bed Mobility Training  Bed Mobility Training: No  Balance  Sitting: Intact  Standing: Intact  Transfer Training  Transfer Training: Yes  Interventions: Safety awareness training;Verbal cues  Sit to Stand: Stand-by  assistance  Stand to Sit: Stand-by assistance  Gait Training  Right Side Weight Bearing: As tolerated  Left Side Weight Bearing: As tolerated  Gait  Gait Training: Yes  Left Side Weight Bearing: As tolerated  Right Side Weight Bearing: As tolerated  Overall Level of Assistance: Stand-by assistance  Distance (ft): 280 Feet  Assistive Device: None  Base of Support: Widened  Speed/Kelsey: Slow  Stairs - Level of Assistance: Stand-by assistance  Number of Stairs Trained: 6           Safety Devices  Type of Devices: Call light within reach;All fall risk precautions in place;Left in chair;Gait belt;Nurse notified;Patient at risk for falls  Restraints  Restraints Initially in Place: No       Goals  Short Term Goals  Time Frame for Short Term Goals: 7/2  Short Term Goal 1: Pt will complete bed mobility with Min A x 1  Short Term Goal 2: Pt will ambulate x 150' with LRAD with supervision  Short Term Goal 3: Pt will sit in the chair x 2 hours without fatigue. 6/27: MET  Short Term Goal 4: Pt will complete B LE exercises to improve endurance by 6/29  Patient Goals   Patient Goals : to go home    Education  Patient Education  Education Given To: Patient  Education Provided: Role of Therapy;Precautions;Transfer Training;Energy Conservation;Equipment  Education Method: Verbal  Barriers to Learning: None  Education Outcome: Verbalized understanding;Continued education needed         Therapy Time   Individual Concurrent Group Co-treatment   Time In 1330         Time Out 1353         Minutes 23                 Anabela Wilson, PT

## 2024-06-28 ENCOUNTER — APPOINTMENT (OUTPATIENT)
Dept: GENERAL RADIOLOGY | Age: 39
End: 2024-06-28
Attending: INTERNAL MEDICINE
Payer: COMMERCIAL

## 2024-06-28 VITALS
RESPIRATION RATE: 20 BRPM | TEMPERATURE: 98.9 F | HEART RATE: 81 BPM | OXYGEN SATURATION: 94 % | BODY MASS INDEX: 41.75 KG/M2 | HEIGHT: 73 IN | SYSTOLIC BLOOD PRESSURE: 136 MMHG | WEIGHT: 315 LBS | DIASTOLIC BLOOD PRESSURE: 83 MMHG

## 2024-06-28 LAB
ANION GAP SERPL CALCULATED.3IONS-SCNC: 8 MMOL/L (ref 3–16)
BUN SERPL-MCNC: 15 MG/DL (ref 7–20)
CALCIUM SERPL-MCNC: 8.4 MG/DL (ref 8.3–10.6)
CHLORIDE SERPL-SCNC: 96 MMOL/L (ref 99–110)
CO2 SERPL-SCNC: 28 MMOL/L (ref 21–32)
CREAT SERPL-MCNC: 1.2 MG/DL (ref 0.9–1.3)
DEPRECATED RDW RBC AUTO: 13.9 % (ref 12.4–15.4)
GFR SERPLBLD CREATININE-BSD FMLA CKD-EPI: 79 ML/MIN/{1.73_M2}
GLUCOSE SERPL-MCNC: 108 MG/DL (ref 70–99)
HCT VFR BLD AUTO: 29 % (ref 40.5–52.5)
HGB BLD-MCNC: 9.6 G/DL (ref 13.5–17.5)
MAGNESIUM SERPL-MCNC: 2 MG/DL (ref 1.8–2.4)
MCH RBC QN AUTO: 29.4 PG (ref 26–34)
MCHC RBC AUTO-ENTMCNC: 33.1 G/DL (ref 31–36)
MCV RBC AUTO: 88.9 FL (ref 80–100)
PHOSPHATE SERPL-MCNC: 3.8 MG/DL (ref 2.5–4.9)
PLATELET # BLD AUTO: 324 K/UL (ref 135–450)
PMV BLD AUTO: 7 FL (ref 5–10.5)
POTASSIUM SERPL-SCNC: 3.8 MMOL/L (ref 3.5–5.1)
RBC # BLD AUTO: 3.27 M/UL (ref 4.2–5.9)
SODIUM SERPL-SCNC: 132 MMOL/L (ref 136–145)
WBC # BLD AUTO: 9.9 K/UL (ref 4–11)

## 2024-06-28 PROCEDURE — 94669 MECHANICAL CHEST WALL OSCILL: CPT

## 2024-06-28 PROCEDURE — 6360000002 HC RX W HCPCS: Performed by: INTERNAL MEDICINE

## 2024-06-28 PROCEDURE — 94640 AIRWAY INHALATION TREATMENT: CPT

## 2024-06-28 PROCEDURE — 85027 COMPLETE CBC AUTOMATED: CPT

## 2024-06-28 PROCEDURE — 36415 COLL VENOUS BLD VENIPUNCTURE: CPT

## 2024-06-28 PROCEDURE — 2580000003 HC RX 258

## 2024-06-28 PROCEDURE — 71045 X-RAY EXAM CHEST 1 VIEW: CPT

## 2024-06-28 PROCEDURE — 84100 ASSAY OF PHOSPHORUS: CPT

## 2024-06-28 PROCEDURE — 99231 SBSQ HOSP IP/OBS SF/LOW 25: CPT | Performed by: INTERNAL MEDICINE

## 2024-06-28 PROCEDURE — 99232 SBSQ HOSP IP/OBS MODERATE 35: CPT

## 2024-06-28 PROCEDURE — 6360000002 HC RX W HCPCS

## 2024-06-28 PROCEDURE — 6360000002 HC RX W HCPCS: Performed by: NURSE PRACTITIONER

## 2024-06-28 PROCEDURE — 2580000003 HC RX 258: Performed by: INTERNAL MEDICINE

## 2024-06-28 PROCEDURE — 6370000000 HC RX 637 (ALT 250 FOR IP)

## 2024-06-28 PROCEDURE — 80048 BASIC METABOLIC PNL TOTAL CA: CPT

## 2024-06-28 PROCEDURE — 94660 CPAP INITIATION&MGMT: CPT

## 2024-06-28 PROCEDURE — 83735 ASSAY OF MAGNESIUM: CPT

## 2024-06-28 RX ORDER — OXYCODONE HYDROCHLORIDE 5 MG/1
5 TABLET ORAL EVERY 6 HOURS PRN
Qty: 28 TABLET | Refills: 0 | Status: SHIPPED | OUTPATIENT
Start: 2024-06-28 | End: 2024-06-28

## 2024-06-28 RX ORDER — KETOROLAC TROMETHAMINE 30 MG/ML
15 INJECTION, SOLUTION INTRAMUSCULAR; INTRAVENOUS ONCE
Status: COMPLETED | OUTPATIENT
Start: 2024-06-28 | End: 2024-06-28

## 2024-06-28 RX ORDER — METHOCARBAMOL 750 MG/1
750 TABLET, FILM COATED ORAL 4 TIMES DAILY
Qty: 40 TABLET | Refills: 0 | Status: SHIPPED | OUTPATIENT
Start: 2024-06-28 | End: 2024-07-08

## 2024-06-28 RX ORDER — OXYCODONE HYDROCHLORIDE 5 MG/1
5 TABLET ORAL EVERY 6 HOURS PRN
Qty: 28 TABLET | Refills: 0 | Status: SHIPPED | OUTPATIENT
Start: 2024-06-28 | End: 2024-07-05

## 2024-06-28 RX ORDER — GABAPENTIN 300 MG/1
300 CAPSULE ORAL 3 TIMES DAILY
Qty: 42 CAPSULE | Refills: 0 | Status: SHIPPED | OUTPATIENT
Start: 2024-06-28 | End: 2024-07-12

## 2024-06-28 RX ORDER — METHOCARBAMOL 750 MG/1
750 TABLET, FILM COATED ORAL 4 TIMES DAILY
Status: DISCONTINUED | OUTPATIENT
Start: 2024-06-28 | End: 2024-06-28 | Stop reason: HOSPADM

## 2024-06-28 RX ORDER — AMOXICILLIN AND CLAVULANATE POTASSIUM 875; 125 MG/1; MG/1
1 TABLET, FILM COATED ORAL 2 TIMES DAILY
Qty: 28 TABLET | Refills: 0 | Status: SHIPPED | OUTPATIENT
Start: 2024-06-28 | End: 2024-07-12

## 2024-06-28 RX ORDER — AMOXICILLIN AND CLAVULANATE POTASSIUM 875; 125 MG/1; MG/1
1 TABLET, FILM COATED ORAL 2 TIMES DAILY
Qty: 28 TABLET | Refills: 0 | Status: SHIPPED | OUTPATIENT
Start: 2024-06-28 | End: 2024-06-28

## 2024-06-28 RX ORDER — GABAPENTIN 300 MG/1
300 CAPSULE ORAL 3 TIMES DAILY
Qty: 90 CAPSULE | Refills: 0 | Status: SHIPPED | OUTPATIENT
Start: 2024-06-28 | End: 2024-06-28

## 2024-06-28 RX ORDER — METHOCARBAMOL 750 MG/1
750 TABLET, FILM COATED ORAL 4 TIMES DAILY
Qty: 40 TABLET | Refills: 0 | Status: SHIPPED | OUTPATIENT
Start: 2024-06-28 | End: 2024-06-28

## 2024-06-28 RX ADMIN — ALBUTEROL SULFATE 2.5 MG: 2.5 SOLUTION RESPIRATORY (INHALATION) at 12:07

## 2024-06-28 RX ADMIN — ACETAMINOPHEN 1000 MG: 500 TABLET ORAL at 14:21

## 2024-06-28 RX ADMIN — LOSARTAN POTASSIUM 100 MG: 100 TABLET, FILM COATED ORAL at 08:44

## 2024-06-28 RX ADMIN — ACETAMINOPHEN 1000 MG: 500 TABLET ORAL at 04:02

## 2024-06-28 RX ADMIN — METHOCARBAMOL 750 MG: 750 TABLET ORAL at 14:21

## 2024-06-28 RX ADMIN — Medication 10 ML: at 11:09

## 2024-06-28 RX ADMIN — KETOROLAC TROMETHAMINE 15 MG: 30 INJECTION, SOLUTION INTRAMUSCULAR at 10:59

## 2024-06-28 RX ADMIN — METHOCARBAMOL 750 MG: 750 TABLET ORAL at 08:45

## 2024-06-28 RX ADMIN — FAMOTIDINE 20 MG: 20 TABLET, FILM COATED ORAL at 08:45

## 2024-06-28 RX ADMIN — MUPIROCIN: 20 OINTMENT TOPICAL at 08:50

## 2024-06-28 RX ADMIN — ACETAMINOPHEN 1000 MG: 500 TABLET ORAL at 08:43

## 2024-06-28 RX ADMIN — PIPERACILLIN AND TAZOBACTAM 4500 MG: 4; .5 INJECTION, POWDER, LYOPHILIZED, FOR SOLUTION INTRAVENOUS at 05:50

## 2024-06-28 RX ADMIN — OXYCODONE 10 MG: 5 TABLET ORAL at 06:15

## 2024-06-28 RX ADMIN — OXYCODONE 5 MG: 5 TABLET ORAL at 01:44

## 2024-06-28 RX ADMIN — METHOCARBAMOL 750 MG: 750 TABLET ORAL at 04:05

## 2024-06-28 RX ADMIN — BISACODYL 5 MG: 5 TABLET, COATED ORAL at 08:46

## 2024-06-28 RX ADMIN — ALBUTEROL SULFATE 2.5 MG: 2.5 SOLUTION RESPIRATORY (INHALATION) at 03:46

## 2024-06-28 RX ADMIN — OXYCODONE 10 MG: 5 TABLET ORAL at 11:00

## 2024-06-28 RX ADMIN — GABAPENTIN 300 MG: 300 CAPSULE ORAL at 08:44

## 2024-06-28 RX ADMIN — POLYETHYLENE GLYCOL 3350 17 G: 17 POWDER, FOR SOLUTION ORAL at 08:46

## 2024-06-28 ASSESSMENT — PAIN DESCRIPTION - DESCRIPTORS
DESCRIPTORS: ACHING;DISCOMFORT;CRAMPING;SPASM
DESCRIPTORS: CRAMPING;ACHING;DISCOMFORT;SPASM

## 2024-06-28 ASSESSMENT — PAIN SCALES - GENERAL
PAINLEVEL_OUTOF10: 10
PAINLEVEL_OUTOF10: 2
PAINLEVEL_OUTOF10: 0
PAINLEVEL_OUTOF10: 0
PAINLEVEL_OUTOF10: 7
PAINLEVEL_OUTOF10: 8
PAINLEVEL_OUTOF10: 7
PAINLEVEL_OUTOF10: 8
PAINLEVEL_OUTOF10: 8

## 2024-06-28 ASSESSMENT — PAIN SCALES - WONG BAKER
WONGBAKER_NUMERICALRESPONSE: NO HURT

## 2024-06-28 ASSESSMENT — PAIN DESCRIPTION - LOCATION
LOCATION: CHEST
LOCATION: SHOULDER
LOCATION: SHOULDER
LOCATION: CHEST
LOCATION: SHOULDER
LOCATION: CHEST
LOCATION: CHEST

## 2024-06-28 ASSESSMENT — PAIN DESCRIPTION - ORIENTATION
ORIENTATION: RIGHT

## 2024-06-28 ASSESSMENT — PAIN - FUNCTIONAL ASSESSMENT: PAIN_FUNCTIONAL_ASSESSMENT: ACTIVITIES ARE NOT PREVENTED

## 2024-06-28 NOTE — PROGRESS NOTES
Comprehensive Nutrition Assessment    Type and Reason for Visit:  Reassess    Nutrition Recommendations/Plan:   Continue regular diet and encourage PO intake   RD to add chocolate ensure BID   Encourage protein rich foods  Monitor nutrition adequacy, pertinent labs, bowel habits, wt changes, and clinical progress     Malnutrition Assessment:  Malnutrition Status:  At risk for malnutrition (Comment) (06/28/24 1014)    Context:  Acute Illness     Findings of the 6 clinical characteristics of malnutrition:  Energy Intake:  Mild decrease in energy intake (Comment)  Fluid Accumulation:  Mild Extremities    Nutrition Assessment:    Follow up: Extubated on 6/25 and advanced to regular diet. PO % of meals in EMR. Pt reports poor intake over the past few days, eating about 25% of meals. Reports he does not feel hungry. Reports some nausea and constipation. Reports wt usually varies. No wt loss in EMR.Willing to trial ONS, RD to add ensure BID- pt likes chocolate. Encouraged protein intake, pt compliant. Continue to encourage PO intake, will continue to monitor.    Nutrition Related Findings:    BLE trace edema. -3.6 L since admission. BM on 6/22. Some nausea and constipation Wound Type: Surgical Incision       Current Nutrition Intake & Therapies:    Average Meal Intake: %, 1-25%  Average Supplements Intake: None Ordered  ADULT DIET; Regular    Anthropometric Measures:  Height: 185.4 cm (6' 1\")  Ideal Body Weight (IBW): 184 lbs (84 kg)       Current Body Weight: 165.1 kg (364 lb), 197.3 % IBW. Weight Source: Standing Scale  Current BMI (kg/m2): 48        Weight Adjustment For: No Adjustment                 BMI Categories: Obese Class 3 (BMI 40.0 or greater)    Estimated Daily Nutrient Needs:  Energy Requirements Based On: Kcal/kg (25-30 kcals/kg)  Weight Used for Energy Requirements: Ideal (84 kg)  Energy (kcal/day): 8810-1088  Weight Used for Protein Requirements: Ideal (1.2-1.4 g/kg)  Protein (g/day): 101-118  g  Method Used for Fluid Requirements: 1 ml/kcal  Fluid (ml/day): 9954-9145 ml    Nutrition Diagnosis:   Inadequate protein-energy intake related to inadequate protein-energy intake as evidenced by intake 0-25%    Nutrition Interventions:   Food and/or Nutrient Delivery: Continue Current Diet, Start Oral Nutrition Supplement  Nutrition Education/Counseling: Education not appropriate  Coordination of Nutrition Care: Continue to monitor while inpatient, Interdisciplinary Rounds       Goals:  Previous Goal Met: Progressing toward Goal(s)  Goals: Meet at least 75% of estimated needs, prior to discharge       Nutrition Monitoring and Evaluation:   Behavioral-Environmental Outcomes: None Identified  Food/Nutrient Intake Outcomes: Food and Nutrient Intake, Supplement Intake  Physical Signs/Symptoms Outcomes: Biochemical Data, Weight, Nutrition Focused Physical Findings, Fluid Status or Edema    Discharge Planning:    Continue current diet, Continue Oral Nutrition Supplement     Sarah Edwards MS, RD, LD  Contact: Office: [phone]; Nav: [phone]

## 2024-06-28 NOTE — PROGRESS NOTES
PULMONARY AND CRITICAL CARE INPATIENT NOTE        Ángel Stauffer   : 1985  MRN: [id number]     Admitting Physician: Amando Sebastian MD  Attending Physician: Amando Sebastian MD  PCP: Eddie Calvillo DO    Admission: 2024   Date of Service: 2024    No chief complaint on file.          ASSESSMENT & PLAN       39 y.o. pleasant  male patient with:    Assessment:  Complicated right parapneumonic effusion/empyema.  S/p tPA/dornase installations and chest tube.  S/p VATS/decortication on 2024  Rhinovirus respiratory infection  COPD  CHIDI. Not on CPAP for a while   Morbid obesity      Plan:              Antibiotics per ID  CT chest discontinued  IS/Flutter valve/Volara/Mucomyst/Nebs  Patient is meeting with pulmonary/sleep at [facility] tomorrow for sleep apnea evaluation    LOS: Hospital Day: 1    Code:Full Code    We will sign off.  Please let us know if you have any questions.  Thank you for involving us in this patient's care.             Subjective/Objective             Summary:   59-year-old male patient with HTN with recent admission for pneumonia and small pleural effusion with admitted with persistent fever, headache and cough.    Interval history/Encounter 2024   Patient remained afebrile and hemodynamically stable.  No oxygen needs  I's and O's 180 positive with urine output not recorded and chest tube output 130.  Patient remained on Tylenol, Mucomyst, albuterol, bacitracin, Pepcid, gabapentin, Toradol, Cozaar, Zosyn  Blood sugar remained within range  Elevated micro results  CBC and chemistry relatively stable  Chest x-ray shows stable postsurgical changes, atelectasis, small effusion with stable position of right chest tube      Objective    Test Results:  Imaging:  I have reviewed radiology images personally.    XR CHEST PORTABLE    Result Date: 2024  Persistent trace right pleural effusion and right basilar atelectasis or airspace disease.     XR CHEST PORTABLE    Result Date:

## 2024-06-28 NOTE — DISCHARGE INSTR - COC
Wt (!) 165.2 kg (364 lb 3.2 oz)   SpO2 92%   BMI 48.05 kg/m²     Last documented pain score (0-10 scale): Pain Level: 8  Last Weight:   Wt Readings from Last 1 Encounters:   06/28/24 (!) 165.2 kg (364 lb 3.2 oz)     Mental Status:  {IP PT MENTAL STATUS:23857}    IV Access:  { JUANCHO IV ACCESS:101502473}    Nursing Mobility/ADLs:  Walking   {CHP DME ADLs:933115197}  Transfer  {CHP DME ADLs:725009013}  Bathing  {CHP DME ADLs:314906722}  Dressing  {CHP DME ADLs:823687163}  Toileting  {CHP DME ADLs:621819365}  Feeding  {CHP DME ADLs:465993710}  Med Admin  {CHP DME ADLs:703674432}  Med Delivery   { JUANCHO MED Delivery:416771889}    Wound Care Documentation and Therapy:  Incision 06/24/24 Chest Right (Active)   Dressing Status Dry;Intact 06/27/24 2000   Incision Cleansed Not Cleansed 06/27/24 2000   Dressing/Treatment Negative pressure wound therapy 06/27/24 2000   Closure Other (Comment) 06/27/24 2000   Incision Assessment Other (Comment) 06/26/24 2000   Drainage Amount None (dry) 06/26/24 2000   Number of days: 4        Elimination:  Continence:   Bowel: {YES / NO:19727}  Bladder: {YES / NO:19727}  Urinary Catheter: {Urinary Catheter:943312667}   Colostomy/Ileostomy/Ileal Conduit: {YES / NO:19727}       Date of Last BM: ***    Intake/Output Summary (Last 24 hours) at 6/28/2024 1255  Last data filed at 6/28/2024 0613  Gross per 24 hour   Intake 311.51 ml   Output 80 ml   Net 231.51 ml     I/O last 3 completed shifts:  In: 493.5 [I.V.:1.7; IV Piggyback:491.8]  Out: 1310 [Urine:1150; Chest Tube:160]    Safety Concerns:     { JUANCHO Safety Concerns:528699608}    Impairments/Disabilities:      { JUANCHO Impairments/Disabilities:888834290}    Nutrition Therapy:  Current Nutrition Therapy:   { JUANCHO Diet List:688126587}    Routes of Feeding: {CHP DME Other Feedings:078050631}  Liquids: {Slp liquid thickness:35862}  Daily Fluid Restriction: {CHP DME Yes amt example:826184237}  Last Modified Barium Swallow with Video (Video  Swallowing Test): {Done Not Done Date:336468441}    Treatments at the Time of Hospital Discharge:   Respiratory Treatments: ***  Oxygen Therapy:  {Therapy; copd oxygen:72067}  Ventilator:    {[facility] Vent List:224548994}    Rehab Therapies: {THERAPEUTIC INTERVENTION:6641113424}  Weight Bearing Status/Restrictions: {[facility] Weight Bearin}  Other Medical Equipment (for information only, NOT a DME order):  {EQUIPMENT:117772061}  Other Treatments: ***    Patient's personal belongings (please select all that are sent with patient):  {P DME Belongings:158448527}    RN SIGNATURE:  {Esignature:393556943}    CASE MANAGEMENT/SOCIAL WORK SECTION    Inpatient Status Date: ***    Readmission Risk Assessment Score:  Readmission Risk              Risk of Unplanned Readmission:  16           Discharging to Facility/ Agency   Name:   Address:  Phone:  Fax:    Dialysis Facility (if applicable)   Name:  Address:  Dialysis Schedule:  Phone:  Fax:    / signature: {Esignature:021669370}    PHYSICIAN SECTION    Prognosis: Good    Condition at Discharge: Stable    Rehab Potential (if transferring to Rehab): Good    Recommended Labs or Other Treatments After Discharge: None    Physician Certification: I certify the above information and transfer of Ángel Stauffer  is necessary for the continuing treatment of the diagnosis listed and that he requires Home Care for less 30 days.     Update Admission H&P: No change in H&P    PHYSICIAN SIGNATURE:  Electronically signed by Amando Sebastian MD on 24 at 12:55 PM EDT

## 2024-06-28 NOTE — PROGRESS NOTES
06/28/24 0330   NIV Type   $NIV $Daily Charge   NIV Started/Stopped On   Equipment Type v60   Mode Bilevel   Mask Type Full face mask   Mask Size Large   Assessment   Pulse 77   Settings/Measurements   PIP Observed 13 cm H20   IPAP 12 cmH20   CPAP/EPAP 6 cmH2O   Vt (Measured) 691 mL   Insp Rise Time (%) 1 %   FiO2  30 %   I Time/ I Time % 1 s   Minute Volume (L/min) 12.9 Liters   Mask Leak (lpm) 2 lpm   Patient's Home Machine No   Alarm Settings   Alarms On Y

## 2024-06-28 NOTE — PLAN OF CARE
Problem: Discharge Planning  Goal: Discharge to home or other facility with appropriate resources  Recent Flowsheet Documentation  Taken 6/27/2024 2000 by Elena Jay RN  Discharge to home or other facility with appropriate resources:   Identify barriers to discharge with patient and caregiver   Arrange for needed discharge resources and transportation as appropriate   Identify discharge learning needs (meds, wound care, etc)     Problem: Pain  Goal: Verbalizes/displays adequate comfort level or baseline comfort level  Recent Flowsheet Documentation  Taken 6/28/2024 0400 by Elena Jay RN  Verbalizes/displays adequate comfort level or baseline comfort level:   Encourage patient to monitor pain and request assistance   Assess pain using appropriate pain scale   Implement non-pharmacological measures as appropriate and evaluate response   Administer analgesics based on type and severity of pain and evaluate response  Taken 6/28/2024 0000 by Elena Jay RN  Verbalizes/displays adequate comfort level or baseline comfort level:   Encourage patient to monitor pain and request assistance   Assess pain using appropriate pain scale   Administer analgesics based on type and severity of pain and evaluate response   Implement non-pharmacological measures as appropriate and evaluate response   Consider cultural and social influences on pain and pain management  Taken 6/27/2024 2000 by Elena Jay, RN  Verbalizes/displays adequate comfort level or baseline comfort level:   Encourage patient to monitor pain and request assistance   Assess pain using appropriate pain scale   Administer analgesics based on type and severity of pain and evaluate response   Implement non-pharmacological measures as appropriate and evaluate response   Consider cultural and social influences on pain and pain management     Problem: Safety - Adult  Goal: Free from fall injury  Outcome: Completed  Flowsheets (Taken 6/27/2024 1840

## 2024-06-28 NOTE — PROGRESS NOTES
06/27/24 2300   NIV Type   NIV Started/Stopped On   Equipment Type V60   Mode Bilevel   Mask Type Full face mask   Mask Size Large   Assessment   Comfort Level Good   Using Accessory Muscles No   Mask Compliance Good   Skin Assessment Clean, dry, & intact   Skin Protection for O2 Device Yes   Orientation Middle   Location Nose   Intervention(s) Skin Barrier   Settings/Measurements   PIP Observed 13 cm H20   IPAP 12 cmH20   CPAP/EPAP 6 cmH2O   Vt (Measured) 609 mL   Rate Ordered 12   FiO2  30 %   I Time/ I Time % 1 s   Minute Volume (L/min) 9.7 Liters   Mask Leak (lpm) 2 lpm   Patient's Home Machine No   Alarm Settings   Alarms On Y   Low Pressure (cmH2O) 6 cmH2O   High Pressure (cmH2O) 30 cmH2O   Apnea (secs) 20 secs   RR Low (bpm) 6   RR High (bpm) 50 br/min

## 2024-06-28 NOTE — PROGRESS NOTES
Discharge Note:    Patient discharged home per order.  Reviewed AVS with patient and spouse including home care instructions, medications, follow up appointments. Removed peripheral Ivs and stopped bleeding, applied dressings. Patient left in good condition with all personal belongings via wheelchair with RN accompanying him. Picked up by son in car at main entrance.    Samuel Madison RN

## 2024-06-28 NOTE — PROGRESS NOTES
CVTS Thoracic Progress Note:          CC: Post op follow up    Subj: Doing well. C/O increased pain this AM. Ready to go home.     Obj: Blood pressure 118/79, pulse 73, temperature 99 °F (37.2 °C), temperature source Axillary, resp. rate 20, height 1.854 m (6' 1\"), weight (!) 165.2 kg (364 lb 3.2 oz), SpO2 92 %.     General: Alert, OOB to chair. Appears uncomfortable. NAD  Cardiovascular: Sinus rhythm on monitor, HR 80's  Pulmonary: NWOB on nasal cannula  Abdomen : Obese. soft and non-distended, non tender  Incision C/D/I  Chest tube with 130 ml serosanguinous drainage in last 24 hours/no airleak    Diagnostics:   CBC with Differential:    Lab Results   Component Value Date/Time    WBC 9.9 06/28/2024 04:35 AM    RBC 3.27 06/28/2024 04:35 AM    HGB 9.6 06/28/2024 04:35 AM    HCT 29.0 06/28/2024 04:35 AM     06/28/2024 04:35 AM    MCV 88.9 06/28/2024 04:35 AM    MCH 29.4 06/28/2024 04:35 AM    MCHC 33.1 06/28/2024 04:35 AM    RDW 13.9 06/28/2024 04:35 AM    BANDSPCT 3 06/20/2024 04:57 AM    METASPCT 1 06/20/2024 04:57 AM    LYMPHOPCT 16.0 06/20/2024 04:57 AM    MONOPCT 9.0 06/20/2024 04:57 AM    MYELOPCT 1 06/20/2024 04:57 AM    EOSPCT 1.0 06/20/2024 04:57 AM    BASOPCT 0.0 06/20/2024 04:57 AM    MONOSABS 0.8 06/20/2024 04:57 AM    EOSABS 0.1 06/20/2024 04:57 AM    BASOSABS 0.0 06/20/2024 04:57 AM     BMP:    Lab Results   Component Value Date/Time     06/28/2024 04:35 AM    K 3.8 06/28/2024 04:35 AM    CL 96 06/28/2024 04:35 AM    CO2 28 06/28/2024 04:35 AM    BUN 15 06/28/2024 04:35 AM    CREATININE 1.2 06/28/2024 04:35 AM    CALCIUM 8.4 06/28/2024 04:35 AM    LABGLOM 79 06/28/2024 04:35 AM    GLUCOSE 108 06/28/2024 04:35 AM     Recent Labs     06/26/24  0439 06/27/24  0433 06/28/24  0435   WBC 15.1* 11.9* 9.9   HGB 10.3* 9.9* 9.6*   HCT 30.9* 30.2* 29.0*    301 324                                                                    Recent Labs     06/26/24  0439 06/27/24  0433 06/28/24  0435   NA

## 2024-06-28 NOTE — PROGRESS NOTES
management of the case with consultants as follows:    [x] Discussed the discharge plan in detail with case mgt including timing/barriers to discharge, need for support services and placement decision   [] Imaging personally reviewed and interpreted, includes:   [x] Telemetry monitoring as noted above  [x] Data Review (any 3)  [] Collateral history obtained from:    [x] All available Consultant notes from yesterday/today were reviewed  [x] All current labs were reviewed and interpreted for clinical significance   [x] Appropriate follow-up labs were ordered    Medications:  Personally reviewed in detail in conjunction w/ labs as documented for evidence of drug toxicity.     Infusion Medications    niCARdipine Stopped (06/26/24 2300)    sodium chloride       Scheduled Medications    methocarbamol  750 mg Oral 4x Daily    ketorolac  15 mg IntraVENous Once    losartan  100 mg Oral Daily    acetylcysteine  600 mg Inhalation BID RT    albuterol  2.5 mg Nebulization Q4H RT    piperacillin-tazobactam  4,500 mg IntraVENous Q8H    mupirocin   Each Nostril BID    gabapentin  300 mg Oral TID    sodium chloride flush  5-40 mL IntraVENous 2 times per day    bacitracin   Topical Daily    famotidine  20 mg Oral BID    acetaminophen  1,000 mg Oral Q6H    polyethylene glycol  17 g Oral Daily    bisacodyl  5 mg Oral Daily     PRN Meds: hydrALAZINE, sodium chloride flush, sodium chloride, potassium chloride **OR** potassium alternative oral replacement **OR** potassium chloride, magnesium sulfate, oxyCODONE **OR** oxyCODONE, ondansetron **OR** ondansetron, magnesium hydroxide, bisacodyl     Labs:  Personally reviewed and interpreted for clinical significance.     Recent Labs     06/26/24 0439 06/27/24 0433 06/28/24 0435   WBC 15.1* 11.9* 9.9   HGB 10.3* 9.9* 9.6*   HCT 30.9* 30.2* 29.0*    301 324       Recent Labs     06/26/24 0439 06/27/24 0433 06/28/24 0435   * 135* 132*   K 4.6 4.0  4.0 3.8   CL 98* 99 96*   CO2

## 2024-06-28 NOTE — DISCHARGE INSTRUCTIONS
Thoracic Procedure Discharge Instructions     Chest Tube Insertion Site:   Leave dressing on until Monday 7/1/24 at . You may remove it at that time and clean the site daily with antibacterial soap. You may take a shower after the dressing is removed.     Surgical Procedure Site:   You may leave your old incision site open to air after discharge home. Clean the site daily with antibacterial soap.    All Surgical/Chest Tube Sites:   -Once you remove your old chest tube dressing, you may apply a band-aid as needed for any spotting and/or drainage. A dressing is not needed and please do not use creams, lotions, or Neosporin (antibiotic cream) on or around surgical and old chest tube sites.    -Do not soak in a bath tub and do not let water beat on your incisions during a shower.    -Do not lift anything greater than 10 pounds for 2 weeks while incision heals.     Special Instructions:   -Continue to use your incentive spirometer every 1-2 hours while you are awake at home to keep your lungs inflated and to prevent the development of pneumonia. Continue to use your incentive spirometer until your next appointment with Dr. Yan and you are instructed to do otherwise by your surgeon.   -Call Dr. Terry's office if any of your incisions appear red, if they are draining any fluid that appears like pus, if you are running a high grade fever (greater than 101.5 degrees), or if you have any questions about your surgery or incisions at any time.      Follow up with Dr. Terry  in  so that you can be evaluated after your surgical procedure. Please call the Card[facility] Thor[facility] Surgeons' Office at [phone] to make that appointment after you are discharged

## 2024-06-28 NOTE — PROGRESS NOTES
Shift: [id number]    Procedure:     OPEN RIGHT THORACOTOMY WITH TOTAL LUNG DECORTICATION     Surgeon(s):  Emmanuel Terry MD     Assistant:   Physician Assistant: Beulah Vanessa PA-C     Anesthesia: General     Estimated Blood Loss (mL): 500     Complications: None    Admit from OR (time and date): 6/24/2024 @ 1200      Surgery, return to OR no     Nursing assessment at handoff  stable    Most recent vitals: /68   Pulse 84   Temp 99 °F (37.2 °C) (Axillary)   Resp 20   Ht 1.854 m (6' 1\")   Wt (!) 166.1 kg (366 lb 2.9 oz)   SpO2 96%   BMI 48.31 kg/m²      Increased O2 requirements: no O2 requirements: Oxygen Therapy  SpO2: 96 %  Pulse Oximetry Type: Continuous  Pulse Oximeter Device Mode: Continuous  Pulse Oximeter Device Location: Finger  O2 Device: None (Room air)  Oximetry Probe Site Changed: Yes  Skin Assessment: Clean, dry, & intact  Skin Protection for O2 Device: Yes  Orientation: Middle  Location: Nose  Intervention(s): Skin Barrier  FiO2 : 30 %  O2 Flow Rate (L/min): 3 L/min  Vent Mode: (S) CPAP/PS     Admission weight Weight - Scale: (!) 166.2 kg (366 lb 4.8 oz)  Today's weight   Wt Readings from Last 1 Encounters:   06/27/24 (!) 166.1 kg (366 lb 2.9 oz)           Drop in Urinary Output no     Rhythm Changes Normal Sinus Rhythm      Lines/Drains  LDA Insertion Date Discontinued Date Dressing Changes   Art line      Central Line      Soria 6/24/24 6/26/24    Chest Tube 6/24/24     Wires       ETT 6/24/24 6/25/24    GABRIEL Drain      VasCath      Impella        Interventions After Office Hours  Problem(Brief) Date Time Intervention Physician contacted                                               Drip rates at handoff:    niCARdipine Stopped (06/26/24 2300)    sodium chloride         Hospital Course:  POD#0 Days:   -Patient out of OR at 1200  -Remained hemodynamically stable throughout shift   -Will remain mechanically ventilated today per Dr. Terry   -Patients wife at bedside and updated on  plan of care     POD #0 Nights 6/24  -no acute event  -still on ventilator  -SAT done    POD #2 Nights 6/26:  - Patient needed prn pain medication - 2 mg and 4 mg of morphine administered  - CT output of 30 marked at 860  - Patient walked whole unit and tolerated very well - no pauses needed  - Urine output of 1150 mL  - Chest tube site clean dry and intact  - Cardene gtt never started    POD #2 Days 6/27  - CT to water seal, 90 mL out  - Ambulated several times in dixon  - Pain managed with PO PRNs  - Home Losartan restarted    POD #2 Nights 6/27:  - CT output of 40mL and marked at 990  - Patient in pain and managed with prn and scheduled meds  - Ambulated well and up in chair  - patient refused bath due to extreme pain      Lab Data:  CBC:   Recent Labs     06/27/24  0433 06/28/24  0435   WBC 11.9* 9.9   HGB 9.9* 9.6*   HCT 30.2* 29.0*   MCV 89.2 88.9    324       BMP:    Recent Labs     06/27/24  0433 06/28/24  0435   * 132*   K 4.0  4.0 3.8   CO2 28 28   BUN 17 15   CREATININE 1.3 1.2       LIVR: No results for input(s): \"AST\", \"ALT\" in the last 72 hours.  PT/INR: No results for input(s): \"PROT\", \"INR\" in the last 72 hours.  APTT: No results for input(s): \"APTT\" in the last 72 hours.  ABG:   Recent Labs     06/25/24  0805   PHART 7.397   [id number] 48.8*   PO2ART 140.9*        Electronically signed by Elena Jay RN on 6/28/2024 at 5:34 AM

## 2024-06-29 NOTE — DISCHARGE SUMMARY
Hospital Medicine Discharge Summary    Patient: Ángel Stauffer   : 1985     Admit Date: 2024   Discharge Date: 2024    Disposition:  [x]Home   [x]HHC  []SNF  []Acute Rehab  []LTAC  []Hospice  Code status:  [x]Full  []DNR/CCA  []Limited (DNR/CCA with Do Not Intubate)  []DNRCC  Condition at Discharge: Stable  Primary Care Provider: Eddie Calvillo DO    Admitting Provider: Amando Sebastian MD  Discharge Provider: Amando Sebastian MD     Discharge Diagnoses:      Active Hospital Problems    Diagnosis     Right arm pain [M79.601]     Empyema lung (HCC) [J86.9]     Empyema (HCC) [J86.9]        Presenting Admission History:          Ángel Stauffer is a 39 y.o. male with pmh of essential hypertension, who was initially admitted to [facility] on 2024, with persistent fever up to 102 °F, headache, cough, dizziness; was reportedly discharged 3 weeks prior on levofloxacin and azithromycin for pneumonia; on this admission initially found to have small pleural effusion.  Pleural effusion continued to appear to worsen on serial x-rays and was evaluated with bedside ultrasound by pulmonology team-found to have moderate loculated fluid.  CT-guided chest tube placed on 2024 by IR team -immediate return of tan purulent fluid.  MIST protocol started by pulmonology team with intrapleural instillation of tPA/Pulmozyme-patient received 3 doses 12 hours apart, last instillation on  at 7am.  Patient was transferred to [facility] for evaluation for VATS with cardiothoracic surgery.         Assessment/Plan:        Empyema, pneumonia  - CT surgery consulted s/p open R thoracotomy w/ total lung decortication  w/out complications.   Chest tube placed.   - ID consulted and appreciated - Zosyn continued.       Rhinovirus  - supportive care     COPD - w/out chronic hypoxic respiratory failure on no baseline home O2.  Controlled on home medication regimen - continued.      HTN - w/out known CAD and no  right lung.  Residual opacity remains at the right base.  Residual pleural gas remains on the right. There is thickening of the parietal pleura seen on the right. Upper Abdomen: Adrenal glands unremarkable.  There is splenomegaly. Soft Tissues/Bones: Spurring is seen in the spine.  Spurring is seen in the shoulder joints.     Decreased right-sided pleural effusion, with small amount of pleural fluid and pleural gas remaining.  There is incomplete re-expansion of the right lung and residual consolidation in the right base Small mediastinal nodes are noted, likely reactive Splenomegaly     XR CHEST PORTABLE    Result Date: 6/21/2024  EXAMINATION: ONE XRAY VIEW OF THE CHEST 6/21/2024 8:19 am COMPARISON: 06/17/2024 HISTORY: ORDERING SYSTEM PROVIDED HISTORY: Parapneumonic effusion TECHNOLOGIST PROVIDED HISTORY: Reason for exam:->Parapneumonic effusion Reason for Exam: parapnemonic effusion FINDINGS: Patient is lordotic. Heart size stable. No focal consolidation on the left. There is persistent but decreased opacity at the right lung base.  Pleural drainage catheter is seen on the right.     Decreased pleural-parenchymal disease on the right     CT GUIDED CHEST TUBE    Result Date: 6/18/2024  PROCEDURE: CT GUIDED CHEST TUBE PLACEMENT 6/18/2024 HISTORY: ORDERING SYSTEM PROVIDED HISTORY: Right empyema PHYSICIANS: Jose Moreno D.O. TECHNIQUE: Dose modulation, iterative reconstruction, and/or weight based adjustment of the mA/kV was utilized to reduce the radiation dose to as low as reasonably achievable. Total exam DLP: 260.24 mGy-cm CONTRAST: None. SEDATION: No intravenous sedation was administered. Local anesthesia was achieved with lidocaine. DESCRIPTION OF PROCEDURE: Informed consent was obtained after a detailed explanation of the procedure including risks, benefits, and alternatives. Universal protocol was followed. A suitable skin site was prepped and draped in sterile fashion following CT localization of the right

## 2024-07-01 LAB
FUNGUS SPEC CULT: NORMAL
LOEFFLER MB STN SPEC: NORMAL

## 2024-07-08 LAB
FUNGUS SPEC CULT: NORMAL
LOEFFLER MB STN SPEC: NORMAL

## 2024-07-15 LAB
FUNGUS SPEC CULT: NORMAL
LOEFFLER MB STN SPEC: NORMAL

## 2024-07-22 LAB
FUNGUS SPEC CULT: NORMAL
LOEFFLER MB STN SPEC: NORMAL

## 2024-08-23 ENCOUNTER — TELEPHONE (OUTPATIENT)
Dept: PULMONOLOGY | Age: 39
End: 2024-08-23

## 2024-08-23 NOTE — TELEPHONE ENCOUNTER
Left message for patient to call the office. He needs a sooner appointment to be compliant. 31-90    He was set up on 7/3/2024    He is linked so we can do VV now.

## 2024-09-03 ENCOUNTER — TELEPHONE (OUTPATIENT)
Dept: SLEEP MEDICINE | Age: 39
End: 2024-09-03

## 2024-09-03 ENCOUNTER — TELEMEDICINE (OUTPATIENT)
Dept: SLEEP MEDICINE | Age: 39
End: 2024-09-03
Payer: COMMERCIAL

## 2024-09-03 DIAGNOSIS — E66.01 OBESITY, MORBID, BMI 40.0-49.9 (HCC): ICD-10-CM

## 2024-09-03 DIAGNOSIS — G47.33 SEVERE OBSTRUCTIVE SLEEP APNEA: Primary | ICD-10-CM

## 2024-09-03 DIAGNOSIS — R53.83 OTHER FATIGUE: ICD-10-CM

## 2024-09-03 DIAGNOSIS — I10 PRIMARY HYPERTENSION: ICD-10-CM

## 2024-09-03 DIAGNOSIS — Z71.89 ENCOUNTER FOR BIPAP USE COUNSELING: ICD-10-CM

## 2024-09-03 PROCEDURE — 99214 OFFICE O/P EST MOD 30 MIN: CPT | Performed by: NURSE PRACTITIONER

## 2024-09-03 RX ORDER — ALLOPURINOL 100 MG/1
TABLET ORAL
COMMUNITY
Start: 2024-07-23

## 2024-09-03 ASSESSMENT — SLEEP AND FATIGUE QUESTIONNAIRES
HOW LIKELY ARE YOU TO NOD OFF OR FALL ASLEEP WHILE WATCHING TV: MODERATE CHANCE OF DOZING
HOW LIKELY ARE YOU TO NOD OFF OR FALL ASLEEP WHILE SITTING INACTIVE IN A PUBLIC PLACE: SLIGHT CHANCE OF DOZING
HOW LIKELY ARE YOU TO NOD OFF OR FALL ASLEEP WHILE SITTING AND READING: MODERATE CHANCE OF DOZING
HOW LIKELY ARE YOU TO NOD OFF OR FALL ASLEEP WHEN YOU ARE A PASSENGER IN A CAR FOR AN HOUR WITHOUT A BREAK: MODERATE CHANCE OF DOZING
HOW LIKELY ARE YOU TO NOD OFF OR FALL ASLEEP WHILE SITTING QUIETLY AFTER LUNCH WITHOUT ALCOHOL: MODERATE CHANCE OF DOZING
HOW LIKELY ARE YOU TO NOD OFF OR FALL ASLEEP WHILE LYING DOWN TO REST IN THE AFTERNOON WHEN CIRCUMSTANCES PERMIT: HIGH CHANCE OF DOZING
HOW LIKELY ARE YOU TO NOD OFF OR FALL ASLEEP WHILE SITTING AND TALKING TO SOMEONE: WOULD NEVER DOZE
HOW LIKELY ARE YOU TO NOD OFF OR FALL ASLEEP WHILE WATCHING TV: HIGH CHANCE OF DOZING
HOW LIKELY ARE YOU TO NOD OFF OR FALL ASLEEP IN A CAR, WHILE STOPPED FOR A FEW MINUTES IN TRAFFIC: WOULD NEVER DOZE
HOW LIKELY ARE YOU TO NOD OFF OR FALL ASLEEP IN A CAR, WHILE STOPPED FOR A FEW MINUTES IN TRAFFIC: WOULD NEVER DOZE
ESS TOTAL SCORE: 11
HOW LIKELY ARE YOU TO NOD OFF OR FALL ASLEEP WHILE SITTING INACTIVE IN A PUBLIC PLACE: MODERATE CHANCE OF DOZING
HOW LIKELY ARE YOU TO NOD OFF OR FALL ASLEEP WHILE SITTING AND TALKING TO SOMEONE: SLIGHT CHANCE OF DOZING
HOW LIKELY ARE YOU TO NOD OFF OR FALL ASLEEP WHEN YOU ARE A PASSENGER IN A CAR FOR AN HOUR WITHOUT A BREAK: SLIGHT CHANCE OF DOZING
HOW LIKELY ARE YOU TO NOD OFF OR FALL ASLEEP WHILE LYING DOWN TO REST IN THE AFTERNOON WHEN CIRCUMSTANCES PERMIT: HIGH CHANCE OF DOZING
HOW LIKELY ARE YOU TO NOD OFF OR FALL ASLEEP WHILE SITTING AND READING: MODERATE CHANCE OF DOZING
ESS TOTAL SCORE: 15
HOW LIKELY ARE YOU TO NOD OFF OR FALL ASLEEP WHILE SITTING QUIETLY AFTER LUNCH WITHOUT ALCOHOL: MODERATE CHANCE OF DOZING

## 2024-09-03 NOTE — TELEPHONE ENCOUNTER
Patient had a 31-90 VV on 9-3-2024    He wants orders sent to [facility]    Faxed sleep study, office notes, face sheet, CR, orders to [facility]    Patient has a 6 month follow up scheduled for 3-4-2024

## 2024-09-03 NOTE — PROGRESS NOTES
Patient ID: Ángel Stauffer is a 39 y.o. male who is being seen today for   Chief Complaint   Patient presents with    Follow-up     Sleep  [facility]          HPI:     Ángel Stauffer is a 39 y.o. male  for televideo appointment via video and audio virtual visit for [facility] follow up.  Previous sleep records were located after last appointment.  Patient did not complete BiPAP titration.  States he got a new BIPAP after recent hospital stay.  States he is doing okay with BIPAP.   States he less tired since using BIPAP     Patient is using BiPAP 4-7 hrs/night. Using humidifier. No snoring on BiPAP. The pressure is well tolerated, states could more. The mask is comfortable- full face. +mask leak. No significant daytime sleepiness. No nodding off when driving. No dry nose or throat. No fatigue. Bedtime is 830-930 pm and rise time is 3 am- for work. Sleep onset is usually few minutes. Wakes up 1-2 times at night total. 0-1 nocturia. It takes few minutes to fall back a sleep. Occasional naps during the day. No headache in am. No weight gain. 0-1 caffienated beverages during the day. No alcohol. ESS is 11- discussed with patient        Previous HPI 5/27/24  Ángel Stauffer is a 39 y.o. male for televideo appointment via video and audio virtual visit for [facility]I evaluation.  States he was diagnosed with sleep apnea 5-6 years ago, tried BiPAP at that time however did not tolerate due to oral dryness.  States it did help him but he could not tolerate.  States symptoms are worse, wakes gasping wants to be treated.  States does not have old BiPAP, unsure if he can get old records.      Patient reports snoring at night for the past 15+ years. Worse in supine position. Has witnessed apnea. Wakes up at night choking and gasping for air. No restorative sleep. No dry mouth upon awakening. Fatigue and tiredness during the day. Bedtime 830 pm and rise time is 315 am. It takes few minutes to fall asleep.  2-3

## 2024-10-18 ENCOUNTER — TELEPHONE (OUTPATIENT)
Dept: PULMONOLOGY | Age: 39
End: 2024-10-18

## 2024-10-18 NOTE — TELEPHONE ENCOUNTER
Orders were faxed on 9/3. Refaxed all documents as listed in 9/3 telephone encounter to [facility]

## 2025-03-04 ENCOUNTER — TELEMEDICINE (OUTPATIENT)
Dept: SLEEP MEDICINE | Age: 40
End: 2025-03-04
Payer: COMMERCIAL

## 2025-03-04 ENCOUNTER — TELEPHONE (OUTPATIENT)
Dept: PULMONOLOGY | Age: 40
End: 2025-03-04

## 2025-03-04 DIAGNOSIS — E66.01 OBESITY, MORBID, BMI 40.0-49.9: ICD-10-CM

## 2025-03-04 DIAGNOSIS — Z71.89 ENCOUNTER FOR BIPAP USE COUNSELING: ICD-10-CM

## 2025-03-04 DIAGNOSIS — G47.33 SEVERE OBSTRUCTIVE SLEEP APNEA: Primary | ICD-10-CM

## 2025-03-04 DIAGNOSIS — I10 PRIMARY HYPERTENSION: ICD-10-CM

## 2025-03-04 PROCEDURE — 99214 OFFICE O/P EST MOD 30 MIN: CPT | Performed by: NURSE PRACTITIONER

## 2025-03-04 ASSESSMENT — SLEEP AND FATIGUE QUESTIONNAIRES
HOW LIKELY ARE YOU TO NOD OFF OR FALL ASLEEP WHILE LYING DOWN TO REST IN THE AFTERNOON WHEN CIRCUMSTANCES PERMIT: MODERATE CHANCE OF DOZING
ESS TOTAL SCORE: 10
HOW LIKELY ARE YOU TO NOD OFF OR FALL ASLEEP WHILE SITTING INACTIVE IN A PUBLIC PLACE: SLIGHT CHANCE OF DOZING
HOW LIKELY ARE YOU TO NOD OFF OR FALL ASLEEP WHILE SITTING QUIETLY AFTER LUNCH WITHOUT ALCOHOL: SLIGHT CHANCE OF DOZING
HOW LIKELY ARE YOU TO NOD OFF OR FALL ASLEEP WHEN YOU ARE A PASSENGER IN A CAR FOR AN HOUR WITHOUT A BREAK: MODERATE CHANCE OF DOZING
HOW LIKELY ARE YOU TO NOD OFF OR FALL ASLEEP WHILE SITTING AND READING: MODERATE CHANCE OF DOZING
HOW LIKELY ARE YOU TO NOD OFF OR FALL ASLEEP IN A CAR, WHILE STOPPED FOR A FEW MINUTES IN TRAFFIC: WOULD NEVER DOZE
HOW LIKELY ARE YOU TO NOD OFF OR FALL ASLEEP WHILE SITTING AND TALKING TO SOMEONE: WOULD NEVER DOZE
HOW LIKELY ARE YOU TO NOD OFF OR FALL ASLEEP WHILE WATCHING TV: MODERATE CHANCE OF DOZING

## 2025-03-04 NOTE — PROGRESS NOTES
Patient ID: Ángel Stauffer is a 39 y.o. male who is being seen today for   Chief Complaint   Patient presents with    Follow-up    Sleep Apnea         HPI:     Ángel Stauffer is a 39 y.o. male for televideo appointment via video and audio virtual visit for [facility] follow up.  States he is doing well with BIPAP.  Patient is using BiPAP   6-7 hrs/night. Using humidifier. No snoring on BiPAP. The pressure is well tolerated. The mask is comfortable- full face. No mask leak. No significant daytime sleepiness. Denies nodding off when driving. No dry nose or throat. Some fatigue. Bedtime is 830-9 pm and rise time is 330 am. Sleep onset is few minutes. Wakes up 0 times at night total. No nocturia. Occasional naps during the day. No headache in am. No weight gain. 0-1 caffienated beverages during the day. No alcohol. ESS is 10        Previous HPI 9/3/24  Ángel Stauffer is a 39 y.o. male  for televideo appointment via video and audio virtual visit for [facility]I follow up.  Previous sleep records were located after last appointment.  Patient did not complete BiPAP titration.  States he got a new BIPAP after recent hospital stay.  States he is doing okay with BIPAP.   States he less tired since using BIPAP     Patient is using BiPAP 4-7 hrs/night. Using humidifier. No snoring on BiPAP. The pressure is well tolerated, states could more. The mask is comfortable- full face. +mask leak. No significant daytime sleepiness. No nodding off when driving. No dry nose or throat. No fatigue. Bedtime is 830-930 pm and rise time is 3 am- for work. Sleep onset is usually few minutes. Wakes up 1-2 times at night total. 0-1 nocturia. It takes few minutes to fall back a sleep. Occasional naps during the day. No headache in am. No weight gain. 0-1 caffienated beverages during the day. No alcohol. ESS is 11- discussed with patient        Previous HPI 5/27/24  Ángel Stauffer is a 39 y.o. male for televideo appointment via video and audio virtual visit for [facility]  Yes

## 2025-04-04 NOTE — TELEPHONE ENCOUNTER
LVM for patient to call the office.   Faxed pressure change and CR to [facility] via [email or web address].

## 2025-04-04 NOTE — TELEPHONE ENCOUNTER
BiPAP download report from 3/5/2025-/30/25 on BiPAP 22/18 cm H2O reviewed.  Compliance is good 90%.  AHI has improved however still slightly elevated 5.6.    I change pressure via Airview to BiPAP 23/19 cm H2O.  Please get a download report about a month

## 2025-05-20 ENCOUNTER — TELEPHONE (OUTPATIENT)
Dept: PULMONOLOGY | Age: 40
End: 2025-05-20

## 2025-05-20 ENCOUNTER — TELEMEDICINE (OUTPATIENT)
Dept: SLEEP MEDICINE | Age: 40
End: 2025-05-20
Payer: COMMERCIAL

## 2025-05-20 DIAGNOSIS — E66.01 OBESITY, MORBID, BMI 40.0-49.9 (HCC): ICD-10-CM

## 2025-05-20 DIAGNOSIS — I10 PRIMARY HYPERTENSION: ICD-10-CM

## 2025-05-20 DIAGNOSIS — Z71.89 ENCOUNTER FOR BIPAP USE COUNSELING: ICD-10-CM

## 2025-05-20 DIAGNOSIS — G47.33 SEVERE OBSTRUCTIVE SLEEP APNEA: Primary | ICD-10-CM

## 2025-05-20 PROCEDURE — 99214 OFFICE O/P EST MOD 30 MIN: CPT | Performed by: NURSE PRACTITIONER

## 2025-05-20 ASSESSMENT — SLEEP AND FATIGUE QUESTIONNAIRES
HOW LIKELY ARE YOU TO NOD OFF OR FALL ASLEEP WHILE SITTING QUIETLY AFTER LUNCH WITHOUT ALCOHOL: SLIGHT CHANCE OF DOZING
HOW LIKELY ARE YOU TO NOD OFF OR FALL ASLEEP WHILE LYING DOWN TO REST IN THE AFTERNOON WHEN CIRCUMSTANCES PERMIT: HIGH CHANCE OF DOZING
HOW LIKELY ARE YOU TO NOD OFF OR FALL ASLEEP WHILE SITTING AND READING: SLIGHT CHANCE OF DOZING
HOW LIKELY ARE YOU TO NOD OFF OR FALL ASLEEP WHILE WATCHING TV: MODERATE CHANCE OF DOZING
HOW LIKELY ARE YOU TO NOD OFF OR FALL ASLEEP IN A CAR, WHILE STOPPED FOR A FEW MINUTES IN TRAFFIC: WOULD NEVER DOZE
ESS TOTAL SCORE: 9
HOW LIKELY ARE YOU TO NOD OFF OR FALL ASLEEP WHILE SITTING INACTIVE IN A PUBLIC PLACE: SLIGHT CHANCE OF DOZING
HOW LIKELY ARE YOU TO NOD OFF OR FALL ASLEEP WHEN YOU ARE A PASSENGER IN A CAR FOR AN HOUR WITHOUT A BREAK: SLIGHT CHANCE OF DOZING
HOW LIKELY ARE YOU TO NOD OFF OR FALL ASLEEP WHILE SITTING AND TALKING TO SOMEONE: WOULD NEVER DOZE

## 2025-05-20 NOTE — PROGRESS NOTES
Patient ID: Ángel Stauffer is a 40 y.o. male who is being seen today for   Chief Complaint   Patient presents with    Follow-up    Sleep Apnea         HPI:   Ángel Stauffer is a 40 y.o. male for televideo appointment via video and audio virtual visit for [facility] follow up. States he is doing well with BIPAP other than mask leak.   Patient is using BiPAP   6-7 hrs/night. Using humidifier. No snoring on BiPAP. The pressure is well tolerated. The mask is comfortable-full face. +mask leak. No significant daytime sleepiness. No nodding off when driving. No dry nose or throat. Some fatigue. Bedtime is 830-9 pm and rise time is 330 am-for work. Sleep onset is few minutes. Wakes up 0-2 times at night total-mask leak. No nocturia. It takes few minutes to fall back a sleep. Occasional naps during the day. No headache in am. No weight gain. 0-1 caffienated beverages during the day. No alcohol. ESS is 9        Previous HPI 3/4/25  Ángel Stauffer is a 40 y.o. male for televideo appointment via video and audio virtual visit for [facility]I follow up.  States he is doing well with BIPAP.  Patient is using BiPAP   6-7 hrs/night. Using humidifier. No snoring on BiPAP. The pressure is well tolerated. The mask is comfortable- full face. No mask leak. No significant daytime sleepiness. Denies nodding off when driving. No dry nose or throat. Some fatigue. Bedtime is 830-9 pm and rise time is 330 am. Sleep onset is few minutes. Wakes up 0 times at night total. No nocturia. Occasional naps during the day. No headache in am. No weight gain. 0-1 caffienated beverages during the day. No alcohol. ESS is 10        Previous HPI 9/3/24  Ángel Stauffer is a 40 y.o. male  for televideo appointment via video and audio virtual visit for [facility]I follow up.  Previous sleep records were located after last appointment.  Patient did not complete BiPAP titration.  States he got a new BIPAP after recent hospital stay.  States he is doing okay with BIPAP.   States he less

## 2025-05-20 NOTE — PATIENT INSTRUCTIONS
Get into your favorite sleeping position: If you can't fall asleep within 15-30 minutes, get up and do something boring until you feel sleepy. Sit quietly in the dark or read the warranty on your refrigerator. Don't expose yourself to bright light while you are up, it gives cues to your brain that it is time to wake up.  11- Only use your bed for sleeping: Don’t use the bed as an office, workroom or recreation room. Let your body \"know\" that the bed is associated with sleeping  12- Use comfortable bedding. Uncomfortable bedding can prevent good sleep. Evaluate whether or not this is a source of your problem, and make appropriate changes.  13- Make sure your bed and bedroom are quiet and comfortable: A hot room can be uncomfortable. A cooler room, along with enough blankets to stay warm is recommended. Get a blackout shade or wear a slumber mask and wear earplugs or get a \"white noise\" machine for light and noise distractions.   14- Use sunlight to set your biological clock: When you get up in the morning, go outside and turn your face to the sun for 15 minutes.  15- Don’t take your worries to bed: Leave worries about job, school, daily life, etc., behind when you go to bed. Some people find it useful to assign a \"worry period\" during the evening or afternoon for these issues.

## (undated) DEVICE — CONNECTOR PERF W0.25XH3/8IN BASE Y SHP REDUC W/O LUERLOCK

## (undated) DEVICE — APPLIER LIG CLP M L11IN TI STR RNG HNDL FOR 20 CLP DISP

## (undated) DEVICE — SUTURE MONOCRYL + SZ 4-0 L18IN ABSRB UD L19MM PS-2 3/8 CIR MCP496G

## (undated) DEVICE — DRAIN SURG SGL COLL PT TB FOR ATS BG OASIS

## (undated) DEVICE — SUTURE VICRYL + SZ 0 L27IN ABSRB UD CT-1 L36MM 1/2 CIR TAPR VCP260H

## (undated) DEVICE — SUTURE NONABSORBABLE MONOFILAMENT 4-0 RB-1 36 IN BLU PROLENE 8557H

## (undated) DEVICE — STAPLER SKIN H3.9MM WIRE DIA0.58MM CRWN 6.9MM 35 STPL ROT

## (undated) DEVICE — SUTURE VICRYL SZ 0 L36IN ABSRB UD CT-1 L36MM 1/2 CIR TAPR PNT VCP946H

## (undated) DEVICE — THORACIC CATHETER, STRAIGHT, SILICONE, WITH CLOTSTOP®: Brand: AXIOM® ATRAUM™ WITH CLOTSTOP®

## (undated) DEVICE — THORACIC CATHETER, RIGHT ANGLE, SILICONE, WITH CLOTSTOP®: Brand: AXIOM® ATRAUM™ WITH CLOTSTOP®

## (undated) DEVICE — TROCAR: Brand: KII® OPTICAL ACCESS SYSTEM

## (undated) DEVICE — LAPAROSCOPIC TROCAR SLEEVE/SINGLE USE: Brand: KII® OPTICAL ACCESS SYSTEM

## (undated) DEVICE — ELECTRODE LAP L36CM PTFE WIRE J HK CLEANCOAT

## (undated) DEVICE — TROCAR: Brand: KII SLEEVE

## (undated) DEVICE — SUTURE VICRYL + SZ 3-0 L36IN ABSRB UD L36MM CT-1 1/2 CIR VCP944H

## (undated) DEVICE — SOLUTION IRRIG 1000ML 0.9% SOD CHL USP POUR PLAS BTL

## (undated) DEVICE — 1LYRTR 16FR10ML100%SILTMPS SNP: Brand: MEDLINE INDUSTRIES, INC.

## (undated) DEVICE — CONTAINER,SPECIMEN,OR STERILE,4OZ: Brand: MEDLINE

## (undated) DEVICE — SUTURE PERMA-HAND SZ 0 L18IN NONABSORBABLE BLK L30MM FSL 678G

## (undated) DEVICE — TROCAR: Brand: KII FIOS FIRST ENTRY

## (undated) DEVICE — PREVENA PLUS INCISION MANAGEMENT SYSTEM: Brand: PREVENA PLUS™

## (undated) DEVICE — LIQUIBAND RAPID ADHESIVE 36/CS 0.8ML: Brand: MEDLINE

## (undated) DEVICE — WARMER LAPSCP BST 2 STG STRL DISP HEAT BLU

## (undated) DEVICE — SPONGE LAP W18XL18IN WHT COT 4 PLY FLD STRUNG RADPQ DISP ST 2 PER PACK

## (undated) DEVICE — SET CVR FT AND 8IN UPR

## (undated) DEVICE — DISSECTOR LAP DIA5MM BLNT TIP ENDOPATH

## (undated) DEVICE — [HIGH FLOW INSUFFLATOR,  DO NOT USE IF PACKAGE IS DAMAGED,  KEEP DRY,  KEEP AWAY FROM SUNLIGHT,  PROTECT FROM HEAT AND RADIOACTIVE SOURCES.]: Brand: PNEUMOSURE

## (undated) DEVICE — SEALANT TISS GLUE 4ML PLEUR AIR LEAK PROGEL

## (undated) DEVICE — STERILE LATEX POWDER-FREE SURGICAL GLOVESWITH NITRILE COATING: Brand: PROTEXIS

## (undated) DEVICE — STAPLER SKIN H3.9MM WIRE DIA0.58MM CRWN 6.9MM 35 STPL FIX

## (undated) DEVICE — Device

## (undated) DEVICE — SUTURE ABSORBABLE MONOFILAMENT 0 CTX 60 IN VIO PDS + PDP990G

## (undated) DEVICE — PUMP SUC IRR TBNG L10FT W/ HNDPC ASSEMB STRYKEFLOW 2

## (undated) DEVICE — SUTURE ETHIBOND EXCEL SZ 5 L30IN NONABSORBABLE GRN L40MM V-37 MB66G

## (undated) DEVICE — CONNECTOR PERF 3/8X3/8X3/8IN EQL WYE